# Patient Record
Sex: FEMALE | Race: BLACK OR AFRICAN AMERICAN | NOT HISPANIC OR LATINO | Employment: UNEMPLOYED | URBAN - METROPOLITAN AREA
[De-identification: names, ages, dates, MRNs, and addresses within clinical notes are randomized per-mention and may not be internally consistent; named-entity substitution may affect disease eponyms.]

---

## 2017-06-02 ENCOUNTER — HISTORICAL (OUTPATIENT)
Dept: ADMINISTRATIVE | Facility: HOSPITAL | Age: 32
End: 2017-06-02

## 2017-06-02 LAB — TSH SERPL-ACNC: 1.58 MIU/ML (ref 0.36–3.74)

## 2017-09-26 ENCOUNTER — HISTORICAL (OUTPATIENT)
Dept: ADMINISTRATIVE | Facility: HOSPITAL | Age: 32
End: 2017-09-26

## 2017-09-29 LAB — FINAL CULTURE: NORMAL

## 2017-12-21 ENCOUNTER — HISTORICAL (OUTPATIENT)
Dept: ADMINISTRATIVE | Facility: HOSPITAL | Age: 32
End: 2017-12-21

## 2017-12-21 LAB
ABS NEUT (OLG): 5.65 X10(3)/MCL (ref 2.1–9.2)
BASOPHILS # BLD AUTO: 0 X10(3)/MCL (ref 0–0.2)
BASOPHILS NFR BLD AUTO: 1 %
EOSINOPHIL # BLD AUTO: 0 X10(3)/MCL (ref 0–0.9)
EOSINOPHIL NFR BLD AUTO: 0 %
ERYTHROCYTE [DISTWIDTH] IN BLOOD BY AUTOMATED COUNT: 13.4 % (ref 11.5–17)
ERYTHROCYTE [SEDIMENTATION RATE] IN BLOOD: 36 MM/HR (ref 0–20)
FERRITIN SERPL-MCNC: 20.5 NG/ML (ref 8–388)
HCT VFR BLD AUTO: 37.4 % (ref 37–47)
HGB BLD-MCNC: 11.6 GM/DL (ref 12–16)
LYMPHOCYTES # BLD AUTO: 0.8 X10(3)/MCL (ref 0.6–4.6)
LYMPHOCYTES NFR BLD AUTO: 12 %
MCH RBC QN AUTO: 27.4 PG (ref 27–31)
MCHC RBC AUTO-ENTMCNC: 31 GM/DL (ref 33–36)
MCV RBC AUTO: 88.4 FL (ref 80–94)
MONOCYTES # BLD AUTO: 0.1 X10(3)/MCL (ref 0.1–1.3)
MONOCYTES NFR BLD AUTO: 1 %
NEUTROPHILS # BLD AUTO: 5.65 X10(3)/MCL (ref 1.4–7.9)
NEUTROPHILS NFR BLD AUTO: 86 %
PLATELET # BLD AUTO: 311 X10(3)/MCL (ref 130–400)
PMV BLD AUTO: 9 FL (ref 9.4–12.4)
RBC # BLD AUTO: 4.23 X10(6)/MCL (ref 4.2–5.4)
WBC # SPEC AUTO: 6.6 X10(3)/MCL (ref 4.5–11.5)

## 2018-03-07 ENCOUNTER — HISTORICAL (OUTPATIENT)
Dept: ADMINISTRATIVE | Facility: HOSPITAL | Age: 33
End: 2018-03-07

## 2018-03-07 LAB — TSH SERPL-ACNC: 1.91 MIU/ML (ref 0.36–3.74)

## 2018-03-28 ENCOUNTER — HISTORICAL (OUTPATIENT)
Dept: LAB | Facility: HOSPITAL | Age: 33
End: 2018-03-28

## 2020-09-27 LAB — C DIFF INTERP: NEGATIVE

## 2020-09-28 ENCOUNTER — HISTORICAL (OUTPATIENT)
Dept: INTERNAL MEDICINE | Facility: CLINIC | Age: 35
End: 2020-09-28

## 2020-09-28 LAB
ABS NEUT (OLG): 3.21 X10(3)/MCL (ref 2.1–9.2)
ALBUMIN SERPL-MCNC: 3 GM/DL (ref 3.4–5)
ALBUMIN/GLOB SERPL: 0.6 RATIO (ref 1.1–2)
ALP SERPL-CCNC: 68 UNIT/L (ref 45–117)
ALT SERPL-CCNC: 17 UNIT/L (ref 12–78)
AST SERPL-CCNC: 13 UNIT/L (ref 15–37)
BASOPHILS # BLD AUTO: 0.1 X10(3)/MCL (ref 0–0.2)
BASOPHILS NFR BLD AUTO: 1 %
BILIRUB SERPL-MCNC: 0.4 MG/DL (ref 0.2–1)
BILIRUBIN DIRECT+TOT PNL SERPL-MCNC: 0.1 MG/DL (ref 0–0.2)
BILIRUBIN DIRECT+TOT PNL SERPL-MCNC: 0.3 MG/DL
BUN SERPL-MCNC: 15 MG/DL (ref 7–18)
CALCIUM SERPL-MCNC: 8.6 MG/DL (ref 8.5–10.1)
CHLORIDE SERPL-SCNC: 107 MMOL/L (ref 98–107)
CO2 SERPL-SCNC: 22 MMOL/L (ref 21–32)
CREAT SERPL-MCNC: 0.8 MG/DL (ref 0.6–1.3)
CRP SERPL HS-MCNC: 1.8 MG/DL
DEPRECATED CALCIDIOL+CALCIFEROL SERPL-MC: 19.1 NG/ML (ref 30–80)
EOSINOPHIL # BLD AUTO: 0.8 X10(3)/MCL (ref 0–0.9)
EOSINOPHIL NFR BLD AUTO: 12 %
ERYTHROCYTE [DISTWIDTH] IN BLOOD BY AUTOMATED COUNT: 13.3 % (ref 11.5–14.5)
FERRITIN SERPL-MCNC: 9.6 NG/ML (ref 10–150)
FOLATE SERPL-MCNC: 18.1 NG/ML (ref 3.1–17.5)
GLOBULIN SER-MCNC: 5.4 GM/ML (ref 2.3–3.5)
GLUCOSE SERPL-MCNC: 86 MG/DL (ref 74–106)
HAV AB SER QL IA: NONREACTIVE
HBV SURFACE AB SER-ACNC: 0.23 M[IU]/ML
HBV SURFACE AB SERPL IA-ACNC: NONREACTIVE M[IU]/ML
HBV SURFACE AG SERPL QL IA: NONREACTIVE
HCT VFR BLD AUTO: 37.3 % (ref 35–46)
HGB BLD-MCNC: 11.9 GM/DL (ref 12–16)
IMM GRANULOCYTES # BLD AUTO: 0.01 10*3/UL
IMM GRANULOCYTES NFR BLD AUTO: 0 %
IRON SERPL-MCNC: 45 MCG/DL (ref 50–170)
LYMPHOCYTES # BLD AUTO: 2.1 X10(3)/MCL (ref 0.6–4.6)
LYMPHOCYTES NFR BLD AUTO: 30 %
MCH RBC QN AUTO: 28.7 PG (ref 26–34)
MCHC RBC AUTO-ENTMCNC: 31.9 GM/DL (ref 31–37)
MCV RBC AUTO: 89.9 FL (ref 80–100)
MONOCYTES # BLD AUTO: 0.8 X10(3)/MCL (ref 0.1–1.3)
MONOCYTES NFR BLD AUTO: 11 %
NEG CONT SPOT COUNT: NORMAL
NEUTROPHILS # BLD AUTO: 3.21 X10(3)/MCL (ref 2.1–9.2)
NEUTROPHILS NFR BLD AUTO: 46 %
PANEL A SPOT COUNT: 0
PANEL B SPOT COUNT: 0
PLATELET # BLD AUTO: 288 X10(3)/MCL (ref 130–400)
PMV BLD AUTO: 9.5 FL (ref 7.4–10.4)
POS CONT SPOT COUNT: NORMAL
POTASSIUM SERPL-SCNC: 3.4 MMOL/L (ref 3.5–5.1)
PROT SERPL-MCNC: 8.4 GM/DL (ref 6.4–8.2)
RBC # BLD AUTO: 4.15 X10(6)/MCL (ref 4–5.2)
SODIUM SERPL-SCNC: 138 MMOL/L (ref 136–145)
T-SPOT.TB: NORMAL
VIT B12 SERPL-MCNC: 469 PG/ML (ref 193–986)
WBC # SPEC AUTO: 7 X10(3)/MCL (ref 4.5–11)

## 2020-10-01 LAB — FINAL CULTURE: NORMAL

## 2021-10-13 ENCOUNTER — HISTORICAL (OUTPATIENT)
Dept: ADMINISTRATIVE | Facility: HOSPITAL | Age: 36
End: 2021-10-13

## 2021-10-13 LAB — SARS-COV-2 RNA RESP QL NAA+PROBE: NEGATIVE

## 2021-11-11 ENCOUNTER — HISTORICAL (OUTPATIENT)
Dept: ADMINISTRATIVE | Facility: HOSPITAL | Age: 36
End: 2021-11-11

## 2021-11-11 LAB — SARS-COV-2 RNA RESP QL NAA+PROBE: NEGATIVE

## 2022-04-10 ENCOUNTER — HISTORICAL (OUTPATIENT)
Dept: ADMINISTRATIVE | Facility: HOSPITAL | Age: 37
End: 2022-04-10

## 2022-04-26 VITALS
WEIGHT: 142.44 LBS | OXYGEN SATURATION: 95 % | SYSTOLIC BLOOD PRESSURE: 110 MMHG | HEIGHT: 62 IN | BODY MASS INDEX: 26.21 KG/M2 | DIASTOLIC BLOOD PRESSURE: 75 MMHG

## 2022-04-29 ENCOUNTER — HISTORICAL (OUTPATIENT)
Dept: ENDOSCOPY | Facility: HOSPITAL | Age: 37
End: 2022-04-29
Payer: MEDICAID

## 2022-05-03 DIAGNOSIS — R11.2 NAUSEA AND VOMITING, INTRACTABILITY OF VOMITING NOT SPECIFIED, UNSPECIFIED VOMITING TYPE: Primary | ICD-10-CM

## 2022-05-03 NOTE — TELEPHONE ENCOUNTER
Called and spoke to PT.  She stated that since starting the medications Azathioprine and Predisone she has been vomiting and having more BM's.  She stated that she had a total of 13+ BMs.  Today she did not take the Azathioprine but did take the prednisone.  She did not vomit but was really nauseated.  She denied rash, hives or other allergic symptoms.  What do you recommend? Please advise.

## 2022-05-03 NOTE — TELEPHONE ENCOUNTER
----- Message from Brii Brito sent at 5/3/2022  9:16 AM CDT -----  Pt called and stated she had a colonoscopy 4/29 and she was prescribed 2 medications. She stated when she takes the medication she throws up and has diarrhea. Pt can be reached @ [phone].        Thank You  Marni MORRELL

## 2022-05-03 NOTE — TELEPHONE ENCOUNTER
----- Message from Brii Brito sent at 5/3/2022  9:16 AM CDT -----  Pt called and stated she had a colonoscopy 4/29 and she was prescribed 2 medications. She stated when she takes the medication she throws up and has diarrhea. Pt can be reached @ [phone].        Thank You  Marni MORRELL

## 2022-05-10 PROBLEM — K50.10 CROHN'S DISEASE OF COLON WITHOUT COMPLICATION: Status: ACTIVE | Noted: 2022-05-10

## 2022-05-10 RX ORDER — EPINEPHRINE 1 MG/ML
0.3 INJECTION, SOLUTION, CONCENTRATE INTRAVENOUS
Status: CANCELLED | OUTPATIENT
Start: 2022-05-10

## 2022-05-10 RX ORDER — METHYLPREDNISOLONE SOD SUCC 125 MG
40 VIAL (EA) INJECTION
Status: CANCELLED | OUTPATIENT
Start: 2022-05-10

## 2022-05-10 RX ORDER — IPRATROPIUM BROMIDE AND ALBUTEROL SULFATE 2.5; .5 MG/3ML; MG/3ML
3 SOLUTION RESPIRATORY (INHALATION)
Status: CANCELLED | OUTPATIENT
Start: 2022-05-10

## 2022-05-10 RX ORDER — DIPHENHYDRAMINE HYDROCHLORIDE 50 MG/ML
25 INJECTION INTRAMUSCULAR; INTRAVENOUS
Status: CANCELLED | OUTPATIENT
Start: 2022-05-10

## 2022-05-10 RX ORDER — ACETAMINOPHEN 500 MG
500 TABLET ORAL
Status: CANCELLED | OUTPATIENT
Start: 2022-05-10

## 2022-05-10 RX ORDER — ACETAMINOPHEN 325 MG/1
650 TABLET ORAL
Status: CANCELLED | OUTPATIENT
Start: 2022-05-10

## 2022-05-11 ENCOUNTER — CLINICAL SUPPORT (OUTPATIENT)
Dept: GASTROENTEROLOGY | Facility: CLINIC | Age: 37
End: 2022-05-11
Payer: MEDICAID

## 2022-05-11 DIAGNOSIS — Z23 NEED FOR VACCINATION: Primary | ICD-10-CM

## 2022-05-11 PROCEDURE — 99211 OFF/OP EST MAY X REQ PHY/QHP: CPT | Mod: PBBFAC

## 2022-05-11 PROCEDURE — 90746 HEPB VACCINE 3 DOSE ADULT IM: CPT | Mod: PBBFAC

## 2022-05-11 RX ORDER — ERGOCALCIFEROL 1.25 MG/1
50000 CAPSULE ORAL
COMMUNITY
End: 2023-01-10

## 2022-05-11 RX ORDER — ALBUTEROL SULFATE 90 UG/1
2 AEROSOL, METERED RESPIRATORY (INHALATION) EVERY 6 HOURS PRN
COMMUNITY
Start: 2022-02-01 | End: 2022-11-21

## 2022-05-11 RX ORDER — TRIAMCINOLONE ACETONIDE 5 MG/G
OINTMENT TOPICAL 2 TIMES DAILY
COMMUNITY
Start: 2022-03-15 | End: 2023-06-20

## 2022-05-11 RX ORDER — BUDESONIDE AND FORMOTEROL FUMARATE DIHYDRATE 160; 4.5 UG/1; UG/1
2 AEROSOL RESPIRATORY (INHALATION) EVERY 12 HOURS
COMMUNITY
Start: 2022-02-16

## 2022-05-11 RX ORDER — PREDNISONE 10 MG/1
30 TABLET ORAL DAILY
COMMUNITY
Start: 2022-04-29 | End: 2022-08-09

## 2022-05-11 RX ORDER — ALBUTEROL SULFATE 0.83 MG/ML
2.5 SOLUTION RESPIRATORY (INHALATION)
COMMUNITY
Start: 2021-11-11

## 2022-05-12 ENCOUNTER — TELEPHONE (OUTPATIENT)
Dept: GASTROENTEROLOGY | Facility: CLINIC | Age: 37
End: 2022-05-12
Payer: MEDICAID

## 2022-05-12 NOTE — TELEPHONE ENCOUNTER
Arjun Lezama,   It's the [employer] phones. I was able to reach her on her cell. I have asked her to sign up for the portal so she can see her appts. Also, If you give me her appt date/time, I will call her. Thanks

## 2022-05-12 NOTE — TELEPHONE ENCOUNTER
----- Message from Teja Abdiine sent at 5/12/2022 10:55 AM CDT -----  Regarding: RE: Inflectra  DO you have updated contact info for her? That number wasn't working.  ----- Message -----  From: Elizabeth Mariscal RN  Sent: 5/10/2022   4:16 PM CDT  To: Teja Salazardrine  Subject: Inflectra                                        Please schedule pt for Inflectra infusions. Loading dose wk 0,2,6 then q 8 after. PA & orders in chart. She will need trough level prior to first maintenance infusion. Thanks

## 2022-05-18 RX ORDER — ONDANSETRON 4 MG/1
4 TABLET, ORALLY DISINTEGRATING ORAL EVERY 6 HOURS PRN
Qty: 30 TABLET | Refills: 2 | Status: SHIPPED | OUTPATIENT
Start: 2022-05-18 | End: 2022-05-28

## 2022-05-20 ENCOUNTER — INFUSION (OUTPATIENT)
Dept: INFUSION THERAPY | Facility: HOSPITAL | Age: 37
End: 2022-05-20
Attending: INTERNAL MEDICINE
Payer: MEDICAID

## 2022-05-20 VITALS
DIASTOLIC BLOOD PRESSURE: 61 MMHG | WEIGHT: 138.44 LBS | BODY MASS INDEX: 25.48 KG/M2 | SYSTOLIC BLOOD PRESSURE: 106 MMHG | HEART RATE: 81 BPM | RESPIRATION RATE: 18 BRPM | TEMPERATURE: 98 F | HEIGHT: 62 IN

## 2022-05-20 DIAGNOSIS — K50.10 CROHN'S DISEASE OF COLON WITHOUT COMPLICATION: Primary | ICD-10-CM

## 2022-05-20 PROCEDURE — 63600175 PHARM REV CODE 636 W HCPCS

## 2022-05-20 PROCEDURE — 96375 TX/PRO/DX INJ NEW DRUG ADDON: CPT

## 2022-05-20 PROCEDURE — 96415 CHEMO IV INFUSION ADDL HR: CPT

## 2022-05-20 PROCEDURE — 96413 CHEMO IV INFUSION 1 HR: CPT

## 2022-05-20 PROCEDURE — 25000003 PHARM REV CODE 250: Performed by: INTERNAL MEDICINE

## 2022-05-20 PROCEDURE — 63600175 PHARM REV CODE 636 W HCPCS: Mod: JG | Performed by: INTERNAL MEDICINE

## 2022-05-20 RX ORDER — METHYLPREDNISOLONE SOD SUCC 125 MG
40 VIAL (EA) INJECTION
Status: CANCELLED | OUTPATIENT
Start: 2022-07-15

## 2022-05-20 RX ORDER — ACETAMINOPHEN 500 MG
500 TABLET ORAL
Status: CANCELLED | OUTPATIENT
Start: 2022-07-15

## 2022-05-20 RX ORDER — EPINEPHRINE 1 MG/ML
0.3 INJECTION, SOLUTION, CONCENTRATE INTRAVENOUS
Status: CANCELLED | OUTPATIENT
Start: 2022-07-15

## 2022-05-20 RX ORDER — DIPHENHYDRAMINE HYDROCHLORIDE 50 MG/ML
25 INJECTION INTRAMUSCULAR; INTRAVENOUS
Status: COMPLETED | OUTPATIENT
Start: 2022-05-20 | End: 2022-05-20

## 2022-05-20 RX ORDER — ACETAMINOPHEN 500 MG
500 TABLET ORAL
Status: COMPLETED | OUTPATIENT
Start: 2022-05-20 | End: 2022-05-20

## 2022-05-20 RX ORDER — ACETAMINOPHEN 325 MG/1
650 TABLET ORAL
Status: CANCELLED | OUTPATIENT
Start: 2022-07-15

## 2022-05-20 RX ORDER — DIPHENHYDRAMINE HYDROCHLORIDE 50 MG/ML
25 INJECTION INTRAMUSCULAR; INTRAVENOUS
Status: CANCELLED | OUTPATIENT
Start: 2022-07-15

## 2022-05-20 RX ORDER — METHYLPREDNISOLONE SOD SUCC 125 MG
VIAL (EA) INJECTION
Status: COMPLETED
Start: 2022-05-20 | End: 2022-05-20

## 2022-05-20 RX ORDER — IPRATROPIUM BROMIDE AND ALBUTEROL SULFATE 2.5; .5 MG/3ML; MG/3ML
3 SOLUTION RESPIRATORY (INHALATION)
Status: CANCELLED | OUTPATIENT
Start: 2022-07-15

## 2022-05-20 RX ADMIN — SODIUM CHLORIDE 300 MG: 0.9 INJECTION, SOLUTION INTRAVENOUS at 09:05

## 2022-05-20 RX ADMIN — METHYLPREDNISOLONE SODIUM SUCCINATE 40 MG: 125 INJECTION, POWDER, FOR SOLUTION INTRAMUSCULAR; INTRAVENOUS at 08:05

## 2022-05-20 RX ADMIN — SODIUM CHLORIDE: 9 INJECTION, SOLUTION INTRAVENOUS at 08:05

## 2022-05-20 RX ADMIN — DIPHENHYDRAMINE HYDROCHLORIDE 25 MG: 50 INJECTION INTRAMUSCULAR; INTRAVENOUS at 08:05

## 2022-05-20 RX ADMIN — ACETAMINOPHEN 500 MG: 500 TABLET ORAL at 08:05

## 2022-05-20 NOTE — NURSING
08:03 Patient here for loading dose of Inflectra. Voices no c/o.  12:05 Patient tolerated treatment well.

## 2022-05-21 NOTE — HISTORICAL [facility]ER
This is a historical note converted from [employer]. Formatting and pictures may have been removed.  Please reference [employer] for original formatting and attached multimedia. Chief Complaint  Colonoscopy  History of Present Illness  37F PMHx Crohns presenting for colonoscopy. Last colonoscopy 2015 when diagnosed. Patient tolerated?bowel prep in entirety and is having clear/yellow stools. Denies any abdominal pain, fevers/chills. Recent weight loss ~10-15 lbs due to decreased appetite and NV. Also w/ daily diarrhea, NB. She was on humira but stopped because she did not have a prescription.  Review of Systems  12 point review of systems negative unless otherwise stated  Physical Exam  General:?NAD. Laying in bed comfortably.  Eye: clear conjunctiva, eyelids normal  HENT:?Trachea midline. Normocephalic. Atraumatic  Respiratory:?Symmetric chest expansion. Nonlabored breathing.  Cardiovascular:?regular rate  Gastrointestinal:?soft, non-tender, non-distended, no masses  Genitourinary: no CVA tenderness  Musculoskeletal:?full range of motion of all extremities  Integumentary: no rashes or skin lesions present  Neurologic: Cranial nerves grossly intact  ?  Assessment/Plan  37F PMHx Crohns presenting for colonoscopy  ?   RBA of colonoscopy discussed w/ patient and written consent obtained  Plan for colonoscopy w/ Dr. Blanton  ?  ?   Mariaelena Sommers MD  PGY2 General Surgery?   Problem List/Past Medical History  Ongoing  Asthma  Crohns disease  Historical  Crohns disease  Procedure/Surgical History  Computed tomography, abdomen and pelvis; with contrast material(s) (08/31/2018)  Radiologic examination, gastrointestinal tract, upper; with small intestine, includes multiple serial films (07/18/2018)  Radiologic examination, gastrointestinal tract, upper; with small intestine, includes multiple serial films (07/18/2018)  FISTULA  Tonsillectomy and adenoidectomy; age 12 or over   Medications  Inpatient  No active inpatient  medications  Home  albuterol 0.083% inhalation solution, 2.5 mg= 3 mL, NEB, q6hr, PRN  albuterol 2.5 mg/3 mL (0.083%) inhalation solution, 5 mg= 6 mL, INH, q6hr, PRN  albuterol 90 mcg/inh inhalation aerosol, 1 puff(s), INH, q6hr, PRN  cyproheptadine 4 mg oral tablet, 4 mg= 1 tab(s), Oral, TID  MoviPrep oral powder for reconstitution, 240 mL, Oral, As Directed  prednisONE 5 mg oral tablet, 5 mg= 1 tab(s), Oral, Daily  PrenaCare oral tablet, 1 tab(s), Oral, Daily,? ?Not taking  Allergies  No Known Allergies  Social History  Abuse/Neglect  No, No, Yes, 04/26/2022  Alcohol - No Risk, 12/15/2014  Never, 10/13/2021  Employment/School  Unemployed, 10/13/2021  Substance Use  Never, 10/13/2021  Tobacco - No Risk, 12/15/2014  Never (less than 100 in lifetime), No, 04/26/2022  Family History  Diabetes mellitus type 2: Father.  Immunizations  Vaccine Date Status   influenza virus vaccine, inactivated 09/22/2020 Given   tuberculin purified protein derivative 04/07/2015 Given   measles/mumps/rubella virus vaccine 01/26/2012 Recorded   tetanus/diphtheria/pertussis, acel(Tdap) 09/20/2010 Recorded   measles/mumps/rubella virus vaccine 08/22/1991 Recorded   poliovirus vaccine, live, trivalent 05/03/1990 Recorded   poliovirus vaccine, live, trivalent 11/05/1987 Recorded   measles/mumps/rubella virus vaccine 07/30/1987 Recorded   poliovirus vaccine, live, trivalent 1986 Recorded   poliovirus vaccine, live, trivalent 1986 Recorded       Recently seen in clinic.? See clinic note for details

## 2022-06-03 ENCOUNTER — INFUSION (OUTPATIENT)
Dept: INFUSION THERAPY | Facility: HOSPITAL | Age: 37
End: 2022-06-03
Attending: INTERNAL MEDICINE
Payer: MEDICAID

## 2022-06-03 VITALS — HEART RATE: 95 BPM | SYSTOLIC BLOOD PRESSURE: 107 MMHG | DIASTOLIC BLOOD PRESSURE: 73 MMHG

## 2022-06-03 DIAGNOSIS — K50.10 CROHN'S DISEASE OF COLON WITHOUT COMPLICATION: Primary | ICD-10-CM

## 2022-06-03 PROCEDURE — 96375 TX/PRO/DX INJ NEW DRUG ADDON: CPT

## 2022-06-03 PROCEDURE — 96413 CHEMO IV INFUSION 1 HR: CPT

## 2022-06-03 PROCEDURE — 96415 CHEMO IV INFUSION ADDL HR: CPT

## 2022-06-03 PROCEDURE — 96367 TX/PROPH/DG ADDL SEQ IV INF: CPT

## 2022-06-03 PROCEDURE — 25000003 PHARM REV CODE 250: Performed by: INTERNAL MEDICINE

## 2022-06-03 PROCEDURE — 63600175 PHARM REV CODE 636 W HCPCS: Mod: JG | Performed by: INTERNAL MEDICINE

## 2022-06-03 RX ORDER — METHYLPREDNISOLONE SOD SUCC 125 MG
40 VIAL (EA) INJECTION
Status: COMPLETED | OUTPATIENT
Start: 2022-06-03 | End: 2022-06-03

## 2022-06-03 RX ORDER — IPRATROPIUM BROMIDE AND ALBUTEROL SULFATE 2.5; .5 MG/3ML; MG/3ML
3 SOLUTION RESPIRATORY (INHALATION)
Status: CANCELLED | OUTPATIENT
Start: 2022-07-15

## 2022-06-03 RX ORDER — EPINEPHRINE 1 MG/ML
0.3 INJECTION, SOLUTION, CONCENTRATE INTRAVENOUS
Status: CANCELLED | OUTPATIENT
Start: 2022-07-15

## 2022-06-03 RX ORDER — DIPHENHYDRAMINE HYDROCHLORIDE 50 MG/ML
25 INJECTION INTRAMUSCULAR; INTRAVENOUS
Status: CANCELLED | OUTPATIENT
Start: 2022-07-15

## 2022-06-03 RX ORDER — ACETAMINOPHEN 500 MG
500 TABLET ORAL
Status: COMPLETED | OUTPATIENT
Start: 2022-06-03 | End: 2022-06-03

## 2022-06-03 RX ORDER — ACETAMINOPHEN 325 MG/1
650 TABLET ORAL
Status: CANCELLED | OUTPATIENT
Start: 2022-07-15

## 2022-06-03 RX ORDER — DIPHENHYDRAMINE HYDROCHLORIDE 50 MG/ML
25 INJECTION INTRAMUSCULAR; INTRAVENOUS
Status: COMPLETED | OUTPATIENT
Start: 2022-06-03 | End: 2022-06-03

## 2022-06-03 RX ORDER — ACETAMINOPHEN 500 MG
500 TABLET ORAL
Status: CANCELLED | OUTPATIENT
Start: 2022-07-15

## 2022-06-03 RX ORDER — METHYLPREDNISOLONE SOD SUCC 125 MG
40 VIAL (EA) INJECTION
Status: CANCELLED | OUTPATIENT
Start: 2022-07-15

## 2022-06-03 RX ADMIN — ACETAMINOPHEN 500 MG: 500 TABLET ORAL at 11:06

## 2022-06-03 RX ADMIN — METHYLPREDNISOLONE SODIUM SUCCINATE 40 MG: 125 INJECTION, POWDER, FOR SOLUTION INTRAMUSCULAR; INTRAVENOUS at 12:06

## 2022-06-03 RX ADMIN — SODIUM CHLORIDE 300 MG: 0.9 INJECTION, SOLUTION INTRAVENOUS at 12:06

## 2022-06-03 RX ADMIN — SODIUM CHLORIDE: 9 INJECTION, SOLUTION INTRAVENOUS at 12:06

## 2022-06-03 RX ADMIN — DIPHENHYDRAMINE HYDROCHLORIDE 25 MG: 50 INJECTION, SOLUTION INTRAMUSCULAR; INTRAVENOUS at 12:06

## 2022-06-03 NOTE — NURSING
11:17am Patient here for Infectra wk 2. Voices no c/o.   15:15 Patient discharged to home. Tolerated treatment well.

## 2022-06-13 ENCOUNTER — CLINICAL SUPPORT (OUTPATIENT)
Dept: GASTROENTEROLOGY | Facility: CLINIC | Age: 37
End: 2022-06-13
Payer: MEDICAID

## 2022-06-13 DIAGNOSIS — Z23 NEED FOR VACCINATION: Primary | ICD-10-CM

## 2022-06-13 PROCEDURE — 90746 HEPB VACCINE 3 DOSE ADULT IM: CPT | Mod: PBBFAC

## 2022-06-13 PROCEDURE — 99212 OFFICE O/P EST SF 10 MIN: CPT | Mod: PBBFAC

## 2022-06-13 NOTE — PROGRESS NOTES
Hepatitis B vaccine given in right deltoid.  No complaints.  Pt notified last dose due in 5 months.  Verbalized understanding.

## 2022-07-01 ENCOUNTER — INFUSION (OUTPATIENT)
Dept: INFUSION THERAPY | Facility: HOSPITAL | Age: 37
End: 2022-07-01
Attending: INTERNAL MEDICINE
Payer: MEDICAID

## 2022-07-01 VITALS
HEART RATE: 86 BPM | DIASTOLIC BLOOD PRESSURE: 73 MMHG | SYSTOLIC BLOOD PRESSURE: 107 MMHG | HEIGHT: 62 IN | WEIGHT: 154.13 LBS | BODY MASS INDEX: 28.36 KG/M2

## 2022-07-01 DIAGNOSIS — K50.10 CROHN'S DISEASE OF COLON WITHOUT COMPLICATION: Primary | ICD-10-CM

## 2022-07-01 PROCEDURE — 25000003 PHARM REV CODE 250: Performed by: INTERNAL MEDICINE

## 2022-07-01 PROCEDURE — 63600175 PHARM REV CODE 636 W HCPCS: Performed by: INTERNAL MEDICINE

## 2022-07-01 PROCEDURE — 63600175 PHARM REV CODE 636 W HCPCS

## 2022-07-01 RX ORDER — METHYLPREDNISOLONE SOD SUCC 125 MG
40 VIAL (EA) INJECTION
Status: CANCELLED | OUTPATIENT
Start: 2022-07-15

## 2022-07-01 RX ORDER — DIPHENHYDRAMINE HYDROCHLORIDE 50 MG/ML
25 INJECTION INTRAMUSCULAR; INTRAVENOUS
Status: COMPLETED | OUTPATIENT
Start: 2022-07-01 | End: 2022-07-01

## 2022-07-01 RX ORDER — DIPHENHYDRAMINE HYDROCHLORIDE 50 MG/ML
25 INJECTION INTRAMUSCULAR; INTRAVENOUS
Status: CANCELLED | OUTPATIENT
Start: 2022-07-15

## 2022-07-01 RX ORDER — ACETAMINOPHEN 325 MG/1
650 TABLET ORAL
Status: CANCELLED | OUTPATIENT
Start: 2022-07-15

## 2022-07-01 RX ORDER — METHYLPREDNISOLONE SOD SUCC 125 MG
VIAL (EA) INJECTION
Status: DISCONTINUED
Start: 2022-07-01 | End: 2022-07-01 | Stop reason: SDUPTHER

## 2022-07-01 RX ORDER — METHYLPREDNISOLONE SOD SUCC 125 MG
40 VIAL (EA) INJECTION
Status: COMPLETED | OUTPATIENT
Start: 2022-07-01 | End: 2022-07-01

## 2022-07-01 RX ORDER — ACETAMINOPHEN 500 MG
500 TABLET ORAL
Status: CANCELLED | OUTPATIENT
Start: 2022-07-15

## 2022-07-01 RX ORDER — EPINEPHRINE 1 MG/ML
0.3 INJECTION, SOLUTION, CONCENTRATE INTRAVENOUS
Status: CANCELLED | OUTPATIENT
Start: 2022-07-15

## 2022-07-01 RX ORDER — IPRATROPIUM BROMIDE AND ALBUTEROL SULFATE 2.5; .5 MG/3ML; MG/3ML
3 SOLUTION RESPIRATORY (INHALATION)
Status: CANCELLED | OUTPATIENT
Start: 2022-07-15

## 2022-07-01 RX ORDER — METHYLPREDNISOLONE SOD SUCC 125 MG
40 VIAL (EA) INJECTION
Status: CANCELLED
Start: 2022-07-15

## 2022-07-01 RX ORDER — ACETAMINOPHEN 500 MG
500 TABLET ORAL
Status: COMPLETED | OUTPATIENT
Start: 2022-07-01 | End: 2022-07-01

## 2022-07-01 RX ADMIN — Medication 40 MG: at 12:07

## 2022-07-01 RX ADMIN — DIPHENHYDRAMINE HYDROCHLORIDE 25 MG: 50 INJECTION, SOLUTION INTRAMUSCULAR; INTRAVENOUS at 12:07

## 2022-07-01 RX ADMIN — SODIUM CHLORIDE 350 MG: 0.9 INJECTION, SOLUTION INTRAVENOUS at 12:07

## 2022-07-01 RX ADMIN — ACETAMINOPHEN 500 MG: 500 TABLET ORAL at 12:07

## 2022-07-01 RX ADMIN — METHYLPREDNISOLONE SODIUM SUCCINATE 40 MG: 125 INJECTION, POWDER, FOR SOLUTION INTRAMUSCULAR; INTRAVENOUS at 12:07

## 2022-07-01 RX ADMIN — SODIUM CHLORIDE: 9 INJECTION, SOLUTION INTRAVENOUS at 12:07

## 2022-07-01 NOTE — NURSING
11:26am Patient is here for Inflectra wk 6 infusion. Voices no c/o. Next infusion will be wk 14 in 8weeks with labs.

## 2022-08-09 ENCOUNTER — OFFICE VISIT (OUTPATIENT)
Dept: GASTROENTEROLOGY | Facility: CLINIC | Age: 37
End: 2022-08-09
Payer: MEDICAID

## 2022-08-09 VITALS
RESPIRATION RATE: 18 BRPM | TEMPERATURE: 98 F | SYSTOLIC BLOOD PRESSURE: 94 MMHG | BODY MASS INDEX: 28.59 KG/M2 | WEIGHT: 155.38 LBS | DIASTOLIC BLOOD PRESSURE: 68 MMHG | HEART RATE: 92 BPM | HEIGHT: 62 IN

## 2022-08-09 DIAGNOSIS — K50.10 CROHN'S DISEASE OF COLON WITHOUT COMPLICATION: ICD-10-CM

## 2022-08-09 PROCEDURE — 99214 OFFICE O/P EST MOD 30 MIN: CPT | Mod: PBBFAC | Performed by: INTERNAL MEDICINE

## 2022-08-09 NOTE — PROGRESS NOTES
Inflammatory Bowel Disease        Established Patient Note         TODAY'S VISIT DATE:  8/9/2022  The patient's last visit with me was on Visit date not found.     PCP: Mary Carmen Ocampo      Referring MD:   No ref. provider found    History of Present Illness:  Shivani is a 37 year old with Crohn's colitis here for follow-up.     She was diagnosed with Crohn's colitis by Dr. Ann in January 2015 when she began experiencing diarrhea, abdominal pain, and weight loss. When she was diagnosed she was initially started on a steroid taper, balsalazide and when she did not improve, Humira was started. Fecal calprotectin in march 2018: 445. Adalimumab trough in May 2018 was 1.8, Ab<25, so azathioprine was added. She did not tolerate azathioprine per notes. Due to continued symptoms and low trough level, her Humira dose was increased to 40mg weekly in 2018. She was kept on balsalazide as well.     She did have a rectal abscess in the past, referred to surgery by Dr. Ann, and underwent I&D. No notes to review regarding this and findings.    Adalimumab level in October 2019 was 13, Ab<25. Unsure if this was trough level.    September 2020: Adalimumab trough: 5.7, Ab <25  September 2020: fecal calprotectin 936    When seen initially by me in September 2020, she was doing well clinically. She reported compliance with her Humira weekly. She was no longer taking balsalazide because she felt that it was unhelpful. She reported baseline 1-2 bowel movements a day with occasional hard stools with bloody streaks. She did report occasional breakthrough episodes of increased diarrhea and abdominal pain. She had never had a repeat colonoscopy since starting on Humira. I recommended a colonoscopy at that time. Despite several scheduled colonoscopies, she canceled each appointment and did not follow up till April 2022    She was found to be pregnant in November 2021. She had a miscarriage during this  pregnancy.    In April 2022 on follow-up she was having diarrhea, abdominal pain, weight loss.  She had not been on Humira due to prescription lapse for 3 months.  She was taking NSAIDs and was given prednisone 5mg by PCP which was not helpful. She denied heartburn or reflux.    April 2022 Colonoscopy: multiple scattered medium to large ulcerations throughout the colon.     I started her on infliximab (inflectra) and azathioprine in May 2022.  She has completed loading doses of infliximab.  The azathioprine made her have nausea, vomiting, diarrhea so she stopped taking it and symptoms resolved.  She completed a prednisone taper as well.     She thinks she is around 10 weeks pregnant.  She has not established prenatal care yet.   She has completed her induction doses.  She is now having 1 formed stool a day.  No longer having diarrhea.  She denies any rectal bleeding or abdominal pain.  Appetite is good and weight is stable.      She is having heartburn since she became pregnant.  She is taking otc prilosec or prevacid (she's not sure) which is helpful.     She does not smoke, no significant alcohol or recreational drug use. No FH of IBD..    IBD History:  IBD disease: Crohn's disease  Disease location: Colon    Current IBD Therapy:  Inflectra 5mg/kg s/p induction doses  (May 2022 - present)    Therapeutic Drug Monitoring Labs:      Prior IBD Therapies:  Balsalazide  Humira 40mg weekly (2018- Jan 2022)  Prednisone  Azathioprine      Pertinent Endoscopy/Imaging:  January 7, 2015: Colonoscopy (Dr. Ann): Erythematous mucosa and cratered ulcerations in the ascending and transverse colon. Erythematous mucosa and white plaques in the sigmoid and descending colon. No comment on TI. Pathology: Focal colitis, moderate activity, c/w IBD    April 29, 2022: Colonoscopy: skin tags, normal TI, Med to large patches of deep ulcerations surrounded by normal mucosa in the rectum, sigmoid, descending, transverse, and ascending.   Path: severe active chronic colitis with cryptitis, crypt abscess, dropout      Prior Pertinent Surgeries:   none    Complications:   none    Extraintestinal Manifestations:  None    Review of Systems   Constitutional:  Negative for appetite change and unexpected weight change.   HENT:  Negative for trouble swallowing.    Respiratory:  Negative for chest tightness.    Cardiovascular: Negative.    Gastrointestinal:  Positive for reflux. Negative for abdominal pain, change in bowel habit and change in bowel habit.   Musculoskeletal: Negative.    Neurological: Negative.    Psychiatric/Behavioral: Negative.     All other systems reviewed and are negative.       Medical/Surgical History:   Past Medical History:   Diagnosis Date    Crohn's colitis      History reviewed. No pertinent surgical history.      Family History:   History reviewed. No pertinent family history.     Social History:   Social History     Socioeconomic History    Marital status: Single   Tobacco Use    Smoking status: Never Smoker    Smokeless tobacco: Never Used   Substance and Sexual Activity    Alcohol use: Not Currently    Drug use: Never    Sexual activity: Yes     Partners: Male     Birth control/protection: None     Comment: Currently pregnant        Review of patient's allergies indicates:  No Known Allergies    Current Medications:   Outpatient Medications Marked as Taking for the 8/9/22 encounter (Office Visit) with Mary Blanton MD   Medication Sig Dispense Refill    albuterol (PROVENTIL) 2.5 mg /3 mL (0.083 %) nebulizer solution Inhale 2.5 mg into the lungs as needed.      albuterol (PROVENTIL/VENTOLIN HFA) 90 mcg/actuation inhaler Inhale 2 puffs into the lungs every 6 (six) hours as needed.      albuterol sulfate 90 mcg/actuation aebs Inhale 2 puffs into the lungs every 4 to 6 hours as needed.      ergocalciferol (ERGOCALCIFEROL) 50,000 unit Cap Take 50,000 Units by mouth every 7 days.      SYMBICORT 160-4.5 mcg/actuation HFAA  "Inhale 2 puffs into the lungs every 12 (twelve) hours.      triamcinolone (KENALOG) 0.5 % ointment Apply topically 2 (two) times daily.          Vital Signs:  BP 94/68 (BP Location: Right arm, Patient Position: Sitting, BP Method: Medium (Automatic))   Pulse 92   Temp 98 °F (36.7 °C)   Resp 18   Ht 5' 2" (1.575 m)   Wt 70.5 kg (155 lb 6.4 oz)   BMI 28.42 kg/m²      Physical Exam  Constitutional:       Appearance: Normal appearance.   HENT:      Head: Normocephalic and atraumatic.      Mouth/Throat:      Mouth: Mucous membranes are moist.   Eyes:      Conjunctiva/sclera: Conjunctivae normal.   Cardiovascular:      Rate and Rhythm: Normal rate and regular rhythm.   Pulmonary:      Effort: Pulmonary effort is normal.      Breath sounds: Normal breath sounds.   Abdominal:      General: Bowel sounds are normal.      Palpations: Abdomen is soft.   Musculoskeletal:         General: Normal range of motion.      Cervical back: Normal range of motion.   Skin:     General: Skin is warm.   Neurological:      General: No focal deficit present.      Mental Status: She is alert and oriented to person, place, and time. Mental status is at baseline.   Psychiatric:         Mood and Affect: Mood normal.         Behavior: Behavior normal.       Labs: Reviewed  Hgb   Date Value Ref Range Status   11/20/2021 13.0 12.0 - 16.0 gm/dL Final     Albumin Level   Date Value Ref Range Status   11/20/2021 3.2 (L) 3.5 - 5.0 gm/dL Final     Iron Level   Date Value Ref Range Status   09/28/2020 45 (L) 50 - 170 mcg/dL Final     Ferritin Level   Date Value Ref Range Status   09/28/2020 9.6 (L) 10.0 - 150.0 ng/mL Final     Folate Level   Date Value Ref Range Status   09/28/2020 18.1 (H) 3.1 - 17.5 ng/mL Final     Vitamin B12 Level   Date Value Ref Range Status   09/28/2020 469 193 - 986 pg/mL Final     Vit D 25 OH   Date Value Ref Range Status   09/28/2020 19.1 (L) 30.0 - 80.0 ng/mL Final     Hepatitis B Surface Antigen   Date Value Ref Range " Status   09/28/2020 Nonreactive  Final      HEALTH MAINTENANCE: April 2022  Hgb: 12.7 g/dL  Alb: 3.6 g/dL  Iron: 35 mcg/dL  Ferritin: 27 ng/mL  B12: 854 pg/mL  Folate: 10.4 ng/mL  Vitamin D: 13.4 ng/mL  Zinc: 0.7  T spot: negative April 2022  HBsAg: negative April 2022  CRP: 3.45  Fecal calprotectin: March 2018: 445 --> Sept 2020: 936      Assessment/Plan:    Problem List Items Addressed This Visit          GI    Crohn's disease of colon without complication     Crohn's colitis based on initial colonoscopy in 2015.   Previously on Humira 40mg weekly, clinically seemed to improve but no endoscopic evaluation to assess for mucosal healing  Adalimumab trough 5.7, Ab < 25 when on Humira 40mg weekly back in Sept 2020  Stopped Humira early 2022 April 2022 Colonoscopy: active Crohn's colitis with scattered ulcerations  Inflectra 5mg/kg started May 2022.  Could not tolerate azathioprine  Clinical improvement.   Currently pregnant   Check week 14 trough level.  Goal trough > 10 regardless of pregnancy  Check fecal calprotectin and labs  Attempt to get fecal calprotectin normalized during pregnancy to decrease risk of pregnancy related complications  Start asa 81 mg              Relevant Orders    Calprotectin, Stool (Completed)           HEALTH MAINTENANCE:     Vaccinations:    Influenza (inactive):  recommended annually   PCV 13 (Prevnar): recommended  PPSV 23 (Pneumovax): 2-12 mos after PCV 13, repeat 5 years later and then after age 64 yo  Tetanus (TdaP): September 2010, recommended every 10 years  HPV (males and females ages 11-46 yo): N/A  Meningococcal: No risk factors   Hepatitis B: Twinrix May 2022, June 2022  Hepatitis A:  Twinrix May 2022, June 2022  MMR (live vaccine): UTD          Chickenpox status/Varicella (live vaccine): immune, +Ab  Shingrix: recommended   COVID-19: recommend    Colorectal cancer risk:  Risk factors: > 8 years of disease, > 1/3 colon involved  - Distribution of colonic disease: > 1/3 of  colon  - Year of symptom onset: 2015  - Colonoscopy every 1-3 years after endoscopic remission    Ophthalmologic exam recommended yearly  Dermatologic exam recommended yearly due to risk of skin cancer with IBD/meds    Bone health:  Calcium 0020-6002 mg daily and vitamin D 800 IU daily  Risk factors for osteopenia/osteoporosis: steroid use  Vitamin D: 13.4  Ergocalciferol 50,000 IU weekly x 12 weeks  DEXA scan: recommend    Females:  Gynecological exam yearly.      Smoking status: nonsmoker      Follow up: 3 months

## 2022-08-19 ENCOUNTER — APPOINTMENT (OUTPATIENT)
Dept: LAB | Facility: HOSPITAL | Age: 37
End: 2022-08-19
Attending: INTERNAL MEDICINE
Payer: MEDICAID

## 2022-08-22 LAB — CALPROTECTIN STL-MCNT: 751 MCG/G

## 2022-08-26 ENCOUNTER — INFUSION (OUTPATIENT)
Dept: INFUSION THERAPY | Facility: HOSPITAL | Age: 37
End: 2022-08-26
Attending: INTERNAL MEDICINE
Payer: MEDICAID

## 2022-08-26 VITALS
HEART RATE: 90 BPM | SYSTOLIC BLOOD PRESSURE: 110 MMHG | RESPIRATION RATE: 18 BRPM | HEIGHT: 62 IN | WEIGHT: 156.38 LBS | DIASTOLIC BLOOD PRESSURE: 69 MMHG | BODY MASS INDEX: 28.78 KG/M2 | TEMPERATURE: 98 F

## 2022-08-26 DIAGNOSIS — K50.10 CROHN'S DISEASE OF COLON WITHOUT COMPLICATION: Primary | ICD-10-CM

## 2022-08-26 PROCEDURE — 25000003 PHARM REV CODE 250: Performed by: INTERNAL MEDICINE

## 2022-08-26 PROCEDURE — 96413 CHEMO IV INFUSION 1 HR: CPT

## 2022-08-26 PROCEDURE — 63600175 PHARM REV CODE 636 W HCPCS: Performed by: INTERNAL MEDICINE

## 2022-08-26 PROCEDURE — 96415 CHEMO IV INFUSION ADDL HR: CPT

## 2022-08-26 RX ORDER — ACETAMINOPHEN 500 MG
500 TABLET ORAL
Status: CANCELLED | OUTPATIENT
Start: 2022-09-09

## 2022-08-26 RX ORDER — ACETAMINOPHEN 325 MG/1
650 TABLET ORAL
Status: CANCELLED | OUTPATIENT
Start: 2022-09-09

## 2022-08-26 RX ORDER — METHYLPREDNISOLONE SOD SUCC 125 MG
40 VIAL (EA) INJECTION
Status: CANCELLED
Start: 2022-09-09

## 2022-08-26 RX ORDER — METHYLPREDNISOLONE SOD SUCC 125 MG
40 VIAL (EA) INJECTION
Status: DISCONTINUED | OUTPATIENT
Start: 2022-08-26 | End: 2022-08-26 | Stop reason: HOSPADM

## 2022-08-26 RX ORDER — DIPHENHYDRAMINE HYDROCHLORIDE 50 MG/ML
25 INJECTION INTRAMUSCULAR; INTRAVENOUS
Status: CANCELLED | OUTPATIENT
Start: 2022-09-09

## 2022-08-26 RX ORDER — EPINEPHRINE 1 MG/ML
0.3 INJECTION, SOLUTION, CONCENTRATE INTRAVENOUS
Status: CANCELLED | OUTPATIENT
Start: 2022-09-09

## 2022-08-26 RX ORDER — METHYLPREDNISOLONE SOD SUCC 125 MG
40 VIAL (EA) INJECTION
Status: CANCELLED | OUTPATIENT
Start: 2022-09-09

## 2022-08-26 RX ORDER — ACETAMINOPHEN 500 MG
500 TABLET ORAL
Status: COMPLETED | OUTPATIENT
Start: 2022-08-26 | End: 2022-08-26

## 2022-08-26 RX ORDER — IPRATROPIUM BROMIDE AND ALBUTEROL SULFATE 2.5; .5 MG/3ML; MG/3ML
3 SOLUTION RESPIRATORY (INHALATION)
Status: CANCELLED | OUTPATIENT
Start: 2022-09-09

## 2022-08-26 RX ORDER — DIPHENHYDRAMINE HYDROCHLORIDE 50 MG/ML
25 INJECTION INTRAMUSCULAR; INTRAVENOUS
Status: COMPLETED | OUTPATIENT
Start: 2022-08-26 | End: 2022-08-26

## 2022-08-26 RX ADMIN — SODIUM CHLORIDE 350 MG: 0.9 INJECTION, SOLUTION INTRAVENOUS at 12:08

## 2022-08-26 RX ADMIN — METHYLPREDNISOLONE SODIUM SUCCINATE 40 MG: 40 INJECTION, POWDER, FOR SOLUTION INTRAMUSCULAR; INTRAVENOUS at 12:08

## 2022-08-26 RX ADMIN — DIPHENHYDRAMINE HYDROCHLORIDE 25 MG: 50 INJECTION INTRAMUSCULAR; INTRAVENOUS at 12:08

## 2022-08-26 RX ADMIN — SODIUM CHLORIDE: 9 INJECTION, SOLUTION INTRAVENOUS at 12:08

## 2022-08-26 RX ADMIN — ACETAMINOPHEN 500 MG: 500 TABLET ORAL at 12:08

## 2022-08-30 ENCOUNTER — PATIENT MESSAGE (OUTPATIENT)
Dept: GASTROENTEROLOGY | Facility: CLINIC | Age: 37
End: 2022-08-30
Payer: MEDICAID

## 2022-09-07 ENCOUNTER — HOSPITAL ENCOUNTER (EMERGENCY)
Facility: HOSPITAL | Age: 37
Discharge: HOME | End: 2022-09-07
Attending: STUDENT IN AN ORGANIZED HEALTH CARE EDUCATION/TRAINING PROGRAM
Payer: MEDICAID

## 2022-09-07 VITALS
RESPIRATION RATE: 16 BRPM | BODY MASS INDEX: 28.69 KG/M2 | HEIGHT: 62 IN | SYSTOLIC BLOOD PRESSURE: 116 MMHG | DIASTOLIC BLOOD PRESSURE: 69 MMHG | TEMPERATURE: 98 F | OXYGEN SATURATION: 95 % | WEIGHT: 155.88 LBS | HEART RATE: 68 BPM

## 2022-09-07 DIAGNOSIS — R07.9 CHEST PAIN: ICD-10-CM

## 2022-09-07 DIAGNOSIS — R06.00 DYSPNEA, UNSPECIFIED TYPE: Primary | ICD-10-CM

## 2022-09-07 LAB
ALBUMIN SERPL-MCNC: 3.1 GM/DL (ref 3.5–5)
ALBUMIN/GLOB SERPL: 0.8 RATIO (ref 1.1–2)
ALP SERPL-CCNC: 46 UNIT/L (ref 40–150)
ALT SERPL-CCNC: 8 UNIT/L (ref 0–55)
AST SERPL-CCNC: 8 UNIT/L (ref 5–34)
BASOPHILS # BLD AUTO: 0.05 X10(3)/MCL (ref 0–0.2)
BASOPHILS NFR BLD AUTO: 0.5 %
BILIRUBIN DIRECT+TOT PNL SERPL-MCNC: 0.3 MG/DL
BNP BLD-MCNC: <10 PG/ML
BUN SERPL-MCNC: 10.1 MG/DL (ref 7–18.7)
CALCIUM SERPL-MCNC: 9.1 MG/DL (ref 8.4–10.2)
CHLORIDE SERPL-SCNC: 109 MMOL/L (ref 98–107)
CO2 SERPL-SCNC: 20 MMOL/L (ref 22–29)
CREAT SERPL-MCNC: 0.67 MG/DL (ref 0.55–1.02)
EOSINOPHIL # BLD AUTO: 0.66 X10(3)/MCL (ref 0–0.9)
EOSINOPHIL NFR BLD AUTO: 6.1 %
ERYTHROCYTE [DISTWIDTH] IN BLOOD BY AUTOMATED COUNT: 13.5 % (ref 11.5–17)
GFR SERPLBLD CREATININE-BSD FMLA CKD-EPI: >60 MLS/MIN/1.73/M2
GLOBULIN SER-MCNC: 4.1 GM/DL (ref 2.4–3.5)
GLUCOSE SERPL-MCNC: 124 MG/DL (ref 74–100)
HCT VFR BLD AUTO: 35.1 % (ref 37–47)
HGB BLD-MCNC: 11 GM/DL (ref 12–16)
IMM GRANULOCYTES # BLD AUTO: 0.08 X10(3)/MCL (ref 0–0.04)
IMM GRANULOCYTES NFR BLD AUTO: 0.7 %
INR BLD: 0.96 (ref 0–1.3)
LYMPHOCYTES # BLD AUTO: 0.98 X10(3)/MCL (ref 0.6–4.6)
LYMPHOCYTES NFR BLD AUTO: 9 %
MCH RBC QN AUTO: 29.6 PG (ref 27–31)
MCHC RBC AUTO-ENTMCNC: 31.3 MG/DL (ref 33–36)
MCV RBC AUTO: 94.6 FL (ref 80–94)
MONOCYTES # BLD AUTO: 0.35 X10(3)/MCL (ref 0.1–1.3)
MONOCYTES NFR BLD AUTO: 3.2 %
NEUTROPHILS # BLD AUTO: 8.8 X10(3)/MCL (ref 2.1–9.2)
NEUTROPHILS NFR BLD AUTO: 80.5 %
NRBC BLD AUTO-RTO: 0 %
PLATELET # BLD AUTO: 226 X10(3)/MCL (ref 130–400)
PMV BLD AUTO: 10 FL (ref 7.4–10.4)
POTASSIUM SERPL-SCNC: 4.1 MMOL/L (ref 3.5–5.1)
PROT SERPL-MCNC: 7.2 GM/DL (ref 6.4–8.3)
PROTHROMBIN TIME: 12.7 SECONDS (ref 12.5–14.5)
RBC # BLD AUTO: 3.71 X10(6)/MCL (ref 4.2–5.4)
SODIUM SERPL-SCNC: 135 MMOL/L (ref 136–145)
TROPONIN I SERPL-MCNC: <0.01 NG/ML (ref 0–0.04)
WBC # SPEC AUTO: 10.9 X10(3)/MCL (ref 4.5–11.5)

## 2022-09-07 PROCEDURE — 99285 EMERGENCY DEPT VISIT HI MDM: CPT | Mod: 25

## 2022-09-07 PROCEDURE — 85025 COMPLETE CBC W/AUTO DIFF WBC: CPT | Performed by: STUDENT IN AN ORGANIZED HEALTH CARE EDUCATION/TRAINING PROGRAM

## 2022-09-07 PROCEDURE — 93010 EKG 12-LEAD: ICD-10-PCS | Mod: ,,, | Performed by: INTERNAL MEDICINE

## 2022-09-07 PROCEDURE — 84484 ASSAY OF TROPONIN QUANT: CPT | Performed by: STUDENT IN AN ORGANIZED HEALTH CARE EDUCATION/TRAINING PROGRAM

## 2022-09-07 PROCEDURE — 36415 COLL VENOUS BLD VENIPUNCTURE: CPT | Performed by: STUDENT IN AN ORGANIZED HEALTH CARE EDUCATION/TRAINING PROGRAM

## 2022-09-07 PROCEDURE — 80053 COMPREHEN METABOLIC PANEL: CPT | Performed by: STUDENT IN AN ORGANIZED HEALTH CARE EDUCATION/TRAINING PROGRAM

## 2022-09-07 PROCEDURE — 93005 ELECTROCARDIOGRAM TRACING: CPT

## 2022-09-07 PROCEDURE — 93010 ELECTROCARDIOGRAM REPORT: CPT | Mod: ,,, | Performed by: INTERNAL MEDICINE

## 2022-09-07 PROCEDURE — 83880 ASSAY OF NATRIURETIC PEPTIDE: CPT | Performed by: STUDENT IN AN ORGANIZED HEALTH CARE EDUCATION/TRAINING PROGRAM

## 2022-09-07 PROCEDURE — 85610 PROTHROMBIN TIME: CPT | Performed by: STUDENT IN AN ORGANIZED HEALTH CARE EDUCATION/TRAINING PROGRAM

## 2022-09-08 NOTE — ED PROVIDER NOTES
Encounter Date: 9/7/2022       History     Chief Complaint   Patient presents with    Shortness of Breath     C/O SOB X ONE DAY. HX OF ASTHMA. DENIES CP. STATES OUT OF ONE OF HER INHALERS. REPORTS DRY COUGH.       Patient is a 37-year-old female  that presents with shortness of breath that has been present today. Associated symptoms none. Surrounding information is currently 19 weeks pregnant. Exacerbated by nothing. Relieved by nothing. Patient treatment prior to arrival none. Risk factors include none. Other history pertaining to this complaint short of breath resolved in the ER.       The history is provided by the patient. No  was used.   Review of patient's allergies indicates:  No Known Allergies  Past Medical History:   Diagnosis Date    Crohn's colitis      History reviewed. No pertinent surgical history.  History reviewed. No pertinent family history.  Social History     Tobacco Use    Smoking status: Never    Smokeless tobacco: Never   Substance Use Topics    Alcohol use: Not Currently    Drug use: Never     Review of Systems   Constitutional:  Negative for fever.   Respiratory:  Positive for shortness of breath. Negative for cough.    Cardiovascular:  Negative for chest pain.   Gastrointestinal:  Negative for abdominal pain.   Genitourinary:  Negative for difficulty urinating and dysuria.   Musculoskeletal:  Negative for gait problem.   Skin:  Negative for color change.   Neurological:  Negative for dizziness, speech difficulty and headaches.   Psychiatric/Behavioral:  Negative for hallucinations and suicidal ideas.    All other systems reviewed and are negative.    Physical Exam     Initial Vitals [09/07/22 2140]   BP Pulse Resp Temp SpO2   116/62 101 18 97.7 °F (36.5 °C) 96 %      MAP       --         Physical Exam    Nursing note and vitals reviewed.  Constitutional: She appears well-developed and well-nourished.   HENT:   Head: Normocephalic.   Eyes: EOM are normal.   Neck:    Normal range of motion.  Cardiovascular:  Normal rate, regular rhythm, normal heart sounds and intact distal pulses.           Pulmonary/Chest: Breath sounds normal. No respiratory distress.   Abdominal: Abdomen is soft. Bowel sounds are normal. There is no abdominal tenderness.   Musculoskeletal:         General: Normal range of motion.      Cervical back: Normal range of motion.     Neurological: She is alert and oriented to person, place, and time. She has normal strength.   Skin: Skin is warm and dry.   Psychiatric: She has a normal mood and affect. Her behavior is normal. Judgment and thought content normal.       ED Course   Procedures  Labs Reviewed   COMPREHENSIVE METABOLIC PANEL - Abnormal; Notable for the following components:       Result Value    Sodium Level 135 (*)     Chloride 109 (*)     Carbon Dioxide 20 (*)     Glucose Level 124 (*)     Albumin Level 3.1 (*)     Globulin 4.1 (*)     Albumin/Globulin Ratio 0.8 (*)     All other components within normal limits   CBC WITH DIFFERENTIAL - Abnormal; Notable for the following components:    RBC 3.71 (*)     Hgb 11.0 (*)     Hct 35.1 (*)     MCV 94.6 (*)     MCHC 31.3 (*)     IG# 0.08 (*)     All other components within normal limits   B-TYPE NATRIURETIC PEPTIDE - Normal   TROPONIN I - Normal   PROTIME-INR - Normal   CBC W/ AUTO DIFFERENTIAL    Narrative:     The following orders were created for panel order CBC auto differential.  Procedure                               Abnormality         Status                     ---------                               -----------         ------                     CBC with Differential[[id number]]        Abnormal            Final result                 Please view results for these tests on the individual orders.          Imaging Results              X-Ray Chest PA And Lateral (In process)  Result time 09/07/22 21:57:38                     Medications - No data to display                       Clinical Impression:    Final diagnoses:  [R07.9] Chest pain  [R06.00] Dyspnea, unspecified type (Primary)        ED Disposition Condition    Discharge Stable          ED Prescriptions    None       Follow-up Information       Follow up With Specialties Details Why Contact Info    Your Primary Care Provider  Call in 3 days ed follow up              ROSIO Reilly  09/07/22 1282

## 2022-10-21 ENCOUNTER — INFUSION (OUTPATIENT)
Dept: INFUSION THERAPY | Facility: HOSPITAL | Age: 37
End: 2022-10-21
Attending: INTERNAL MEDICINE
Payer: MEDICAID

## 2022-10-21 VITALS
DIASTOLIC BLOOD PRESSURE: 67 MMHG | RESPIRATION RATE: 18 BRPM | WEIGHT: 166.38 LBS | TEMPERATURE: 98 F | OXYGEN SATURATION: 97 % | BODY MASS INDEX: 30.62 KG/M2 | HEART RATE: 105 BPM | HEIGHT: 62 IN | SYSTOLIC BLOOD PRESSURE: 101 MMHG

## 2022-10-21 DIAGNOSIS — K50.10 CROHN'S DISEASE OF COLON WITHOUT COMPLICATION: Primary | ICD-10-CM

## 2022-10-21 PROCEDURE — 96376 TX/PRO/DX INJ SAME DRUG ADON: CPT

## 2022-10-21 PROCEDURE — 25000003 PHARM REV CODE 250: Performed by: INTERNAL MEDICINE

## 2022-10-21 PROCEDURE — 63600175 PHARM REV CODE 636 W HCPCS: Performed by: INTERNAL MEDICINE

## 2022-10-21 PROCEDURE — 96375 TX/PRO/DX INJ NEW DRUG ADDON: CPT

## 2022-10-21 PROCEDURE — 96413 CHEMO IV INFUSION 1 HR: CPT

## 2022-10-21 PROCEDURE — 96411 CHEMO IV PUSH ADDL DRUG: CPT

## 2022-10-21 RX ORDER — DIPHENHYDRAMINE HYDROCHLORIDE 50 MG/ML
25 INJECTION INTRAMUSCULAR; INTRAVENOUS
Status: CANCELLED | OUTPATIENT
Start: 2022-12-16

## 2022-10-21 RX ORDER — METHYLPREDNISOLONE SOD SUCC 125 MG
40 VIAL (EA) INJECTION
Status: ACTIVE | OUTPATIENT
Start: 2022-10-21 | End: 2022-10-21

## 2022-10-21 RX ORDER — ACETAMINOPHEN 325 MG/1
650 TABLET ORAL
Status: ACTIVE | OUTPATIENT
Start: 2022-10-21 | End: 2022-10-21

## 2022-10-21 RX ORDER — IPRATROPIUM BROMIDE AND ALBUTEROL SULFATE 2.5; .5 MG/3ML; MG/3ML
3 SOLUTION RESPIRATORY (INHALATION)
Status: CANCELLED | OUTPATIENT
Start: 2022-12-16

## 2022-10-21 RX ORDER — ACETAMINOPHEN 325 MG/1
650 TABLET ORAL
Status: CANCELLED | OUTPATIENT
Start: 2022-12-16

## 2022-10-21 RX ORDER — DIPHENHYDRAMINE HYDROCHLORIDE 50 MG/ML
25 INJECTION INTRAMUSCULAR; INTRAVENOUS
Status: COMPLETED | OUTPATIENT
Start: 2022-10-21 | End: 2022-10-21

## 2022-10-21 RX ORDER — DIPHENHYDRAMINE HYDROCHLORIDE 50 MG/ML
25 INJECTION INTRAMUSCULAR; INTRAVENOUS
Status: ACTIVE | OUTPATIENT
Start: 2022-10-21 | End: 2022-10-21

## 2022-10-21 RX ORDER — ACETAMINOPHEN 500 MG
500 TABLET ORAL
Status: CANCELLED | OUTPATIENT
Start: 2022-12-16

## 2022-10-21 RX ORDER — ACETAMINOPHEN 500 MG
500 TABLET ORAL
Status: COMPLETED | OUTPATIENT
Start: 2022-10-21 | End: 2022-10-21

## 2022-10-21 RX ORDER — EPINEPHRINE 1 MG/ML
0.3 INJECTION, SOLUTION, CONCENTRATE INTRAVENOUS
Status: ACTIVE | OUTPATIENT
Start: 2022-10-21 | End: 2022-10-21

## 2022-10-21 RX ORDER — EPINEPHRINE 1 MG/ML
0.3 INJECTION, SOLUTION, CONCENTRATE INTRAVENOUS
Status: CANCELLED | OUTPATIENT
Start: 2022-12-16

## 2022-10-21 RX ORDER — METHYLPREDNISOLONE SOD SUCC 125 MG
40 VIAL (EA) INJECTION
Status: DISCONTINUED | OUTPATIENT
Start: 2022-10-21 | End: 2022-10-21 | Stop reason: SDUPTHER

## 2022-10-21 RX ORDER — METHYLPREDNISOLONE SOD SUCC 125 MG
40 VIAL (EA) INJECTION
Status: CANCELLED | OUTPATIENT
Start: 2022-12-16

## 2022-10-21 RX ORDER — IPRATROPIUM BROMIDE AND ALBUTEROL SULFATE 2.5; .5 MG/3ML; MG/3ML
3 SOLUTION RESPIRATORY (INHALATION)
Status: ACTIVE | OUTPATIENT
Start: 2022-10-21 | End: 2022-10-21

## 2022-10-21 RX ORDER — METHYLPREDNISOLONE SOD SUCC 125 MG
40 VIAL (EA) INJECTION
Status: CANCELLED
Start: 2022-12-16

## 2022-10-21 RX ADMIN — METHYLPREDNISOLONE SODIUM SUCCINATE 40 MG: 40 INJECTION, POWDER, FOR SOLUTION INTRAMUSCULAR; INTRAVENOUS at 10:10

## 2022-10-21 RX ADMIN — SODIUM CHLORIDE 350 MG: 0.9 INJECTION, SOLUTION INTRAVENOUS at 10:10

## 2022-10-21 RX ADMIN — ACETAMINOPHEN 500 MG: 500 TABLET ORAL at 10:10

## 2022-10-21 RX ADMIN — DIPHENHYDRAMINE HYDROCHLORIDE 25 MG: 50 INJECTION INTRAMUSCULAR; INTRAVENOUS at 10:10

## 2022-10-21 RX ADMIN — SODIUM CHLORIDE: 9 INJECTION, SOLUTION INTRAVENOUS at 09:10

## 2022-11-07 DIAGNOSIS — J45.909 ASTHMA, UNSPECIFIED ASTHMA SEVERITY, UNSPECIFIED WHETHER COMPLICATED, UNSPECIFIED WHETHER PERSISTENT: Primary | ICD-10-CM

## 2022-11-07 DIAGNOSIS — R05.9 COUGH, UNSPECIFIED TYPE: ICD-10-CM

## 2022-11-07 DIAGNOSIS — R06.2 WHEEZING: ICD-10-CM

## 2022-11-21 ENCOUNTER — CLINICAL SUPPORT (OUTPATIENT)
Dept: GASTROENTEROLOGY | Facility: CLINIC | Age: 37
End: 2022-11-21
Payer: MEDICAID

## 2022-11-21 DIAGNOSIS — Z23 NEED FOR VACCINATION: Primary | ICD-10-CM

## 2022-11-21 PROCEDURE — 90471 IMMUNIZATION ADMIN: CPT | Mod: PBBFAC

## 2022-11-21 PROCEDURE — 99213 OFFICE O/P EST LOW 20 MIN: CPT | Mod: PBBFAC

## 2022-11-21 PROCEDURE — 90636 HEP A/HEP B VACC ADULT IM: CPT | Mod: PBBFAC

## 2022-11-21 RX ORDER — FERROUS SULFATE TAB 325 MG (65 MG ELEMENTAL FE) 325 (65 FE) MG
325 TAB ORAL DAILY
COMMUNITY
Start: 2022-10-18 | End: 2023-06-20

## 2022-11-21 RX ORDER — IPRATROPIUM BROMIDE AND ALBUTEROL SULFATE 2.5; .5 MG/3ML; MG/3ML
SOLUTION RESPIRATORY (INHALATION)
COMMUNITY
Start: 2022-09-30 | End: 2023-06-20

## 2022-11-21 RX ORDER — ALBUTEROL SULFATE 90 UG/1
2 AEROSOL, METERED RESPIRATORY (INHALATION) EVERY 4 HOURS PRN
COMMUNITY
Start: 2022-08-17

## 2022-11-21 RX ADMIN — HEPATITIS B VACCINE (RECOMBINANT) 20 MCG: 20 INJECTION, SUSPENSION INTRAMUSCULAR at 09:11

## 2022-12-16 ENCOUNTER — INFUSION (OUTPATIENT)
Dept: INFUSION THERAPY | Facility: HOSPITAL | Age: 37
End: 2022-12-16
Attending: INTERNAL MEDICINE
Payer: MEDICAID

## 2022-12-16 VITALS
RESPIRATION RATE: 18 BRPM | TEMPERATURE: 98 F | WEIGHT: 177.69 LBS | OXYGEN SATURATION: 96 % | SYSTOLIC BLOOD PRESSURE: 116 MMHG | HEART RATE: 97 BPM | BODY MASS INDEX: 32.7 KG/M2 | HEIGHT: 62 IN | DIASTOLIC BLOOD PRESSURE: 70 MMHG

## 2022-12-16 DIAGNOSIS — K50.10 CROHN'S DISEASE OF COLON WITHOUT COMPLICATION: Primary | ICD-10-CM

## 2022-12-16 PROCEDURE — 96413 CHEMO IV INFUSION 1 HR: CPT

## 2022-12-16 PROCEDURE — 96375 TX/PRO/DX INJ NEW DRUG ADDON: CPT

## 2022-12-16 PROCEDURE — 96376 TX/PRO/DX INJ SAME DRUG ADON: CPT

## 2022-12-16 PROCEDURE — 63600175 PHARM REV CODE 636 W HCPCS: Performed by: INTERNAL MEDICINE

## 2022-12-16 PROCEDURE — 25000003 PHARM REV CODE 250: Performed by: INTERNAL MEDICINE

## 2022-12-16 PROCEDURE — 96415 CHEMO IV INFUSION ADDL HR: CPT

## 2022-12-16 RX ORDER — METHYLPREDNISOLONE SOD SUCC 125 MG
40 VIAL (EA) INJECTION
Status: DISCONTINUED | OUTPATIENT
Start: 2022-12-16 | End: 2022-12-16 | Stop reason: HOSPADM

## 2022-12-16 RX ORDER — METHYLPREDNISOLONE SOD SUCC 125 MG
40 VIAL (EA) INJECTION
Status: ACTIVE | OUTPATIENT
Start: 2022-12-16 | End: 2022-12-16

## 2022-12-16 RX ORDER — ACETAMINOPHEN 325 MG/1
650 TABLET ORAL
Status: CANCELLED | OUTPATIENT
Start: 2023-02-10

## 2022-12-16 RX ORDER — ACETAMINOPHEN 500 MG
500 TABLET ORAL
Status: CANCELLED | OUTPATIENT
Start: 2023-02-10

## 2022-12-16 RX ORDER — DIPHENHYDRAMINE HYDROCHLORIDE 50 MG/ML
25 INJECTION INTRAMUSCULAR; INTRAVENOUS
Status: CANCELLED | OUTPATIENT
Start: 2023-02-10

## 2022-12-16 RX ORDER — DIPHENHYDRAMINE HYDROCHLORIDE 50 MG/ML
25 INJECTION INTRAMUSCULAR; INTRAVENOUS
Status: COMPLETED | OUTPATIENT
Start: 2022-12-16 | End: 2022-12-16

## 2022-12-16 RX ORDER — EPINEPHRINE 1 MG/ML
0.3 INJECTION, SOLUTION, CONCENTRATE INTRAVENOUS
Status: ACTIVE | OUTPATIENT
Start: 2022-12-16 | End: 2022-12-16

## 2022-12-16 RX ORDER — IPRATROPIUM BROMIDE AND ALBUTEROL SULFATE 2.5; .5 MG/3ML; MG/3ML
3 SOLUTION RESPIRATORY (INHALATION)
Status: ACTIVE | OUTPATIENT
Start: 2022-12-16 | End: 2022-12-16

## 2022-12-16 RX ORDER — ACETAMINOPHEN 325 MG/1
650 TABLET ORAL
Status: ACTIVE | OUTPATIENT
Start: 2022-12-16 | End: 2022-12-16

## 2022-12-16 RX ORDER — METHYLPREDNISOLONE SOD SUCC 125 MG
40 VIAL (EA) INJECTION
Status: CANCELLED | OUTPATIENT
Start: 2023-02-10

## 2022-12-16 RX ORDER — IPRATROPIUM BROMIDE AND ALBUTEROL SULFATE 2.5; .5 MG/3ML; MG/3ML
3 SOLUTION RESPIRATORY (INHALATION)
Status: CANCELLED | OUTPATIENT
Start: 2023-02-10

## 2022-12-16 RX ORDER — METHYLPREDNISOLONE SOD SUCC 125 MG
40 VIAL (EA) INJECTION
Status: CANCELLED
Start: 2023-02-10

## 2022-12-16 RX ORDER — ACETAMINOPHEN 500 MG
500 TABLET ORAL
Status: COMPLETED | OUTPATIENT
Start: 2022-12-16 | End: 2022-12-16

## 2022-12-16 RX ORDER — EPINEPHRINE 1 MG/ML
0.3 INJECTION, SOLUTION, CONCENTRATE INTRAVENOUS
Status: CANCELLED | OUTPATIENT
Start: 2023-02-10

## 2022-12-16 RX ORDER — DIPHENHYDRAMINE HYDROCHLORIDE 50 MG/ML
25 INJECTION INTRAMUSCULAR; INTRAVENOUS
Status: ACTIVE | OUTPATIENT
Start: 2022-12-16 | End: 2022-12-16

## 2022-12-16 RX ADMIN — SODIUM CHLORIDE: 9 INJECTION, SOLUTION INTRAVENOUS at 11:12

## 2022-12-16 RX ADMIN — METHYLPREDNISOLONE SODIUM SUCCINATE 40 MG: 40 INJECTION, POWDER, FOR SOLUTION INTRAMUSCULAR; INTRAVENOUS at 11:12

## 2022-12-16 RX ADMIN — SODIUM CHLORIDE 400 MG: 0.9 INJECTION, SOLUTION INTRAVENOUS at 12:12

## 2022-12-16 RX ADMIN — DIPHENHYDRAMINE HYDROCHLORIDE 25 MG: 50 INJECTION INTRAMUSCULAR; INTRAVENOUS at 11:12

## 2022-12-16 RX ADMIN — ACETAMINOPHEN 500 MG: 500 TABLET ORAL at 11:12

## 2023-01-10 ENCOUNTER — OFFICE VISIT (OUTPATIENT)
Dept: GASTROENTEROLOGY | Facility: CLINIC | Age: 38
End: 2023-01-10
Payer: MEDICAID

## 2023-01-10 VITALS
SYSTOLIC BLOOD PRESSURE: 110 MMHG | BODY MASS INDEX: 32.32 KG/M2 | TEMPERATURE: 98 F | HEIGHT: 62 IN | WEIGHT: 175.63 LBS | DIASTOLIC BLOOD PRESSURE: 73 MMHG | HEART RATE: 60 BPM | RESPIRATION RATE: 18 BRPM

## 2023-01-10 DIAGNOSIS — K50.10 CROHN'S DISEASE OF COLON WITHOUT COMPLICATION: Primary | ICD-10-CM

## 2023-01-10 LAB
ALBUMIN SERPL-MCNC: 2.7 G/DL (ref 3.5–5)
ALBUMIN/GLOB SERPL: 0.6 RATIO (ref 1.1–2)
ALP SERPL-CCNC: 142 UNIT/L (ref 40–150)
ALT SERPL-CCNC: 17 UNIT/L (ref 0–55)
AST SERPL-CCNC: 22 UNIT/L (ref 5–34)
BASOPHILS # BLD AUTO: 0.02 X10(3)/MCL (ref 0–0.2)
BASOPHILS NFR BLD AUTO: 0.3 %
BILIRUBIN DIRECT+TOT PNL SERPL-MCNC: 0.4 MG/DL
BUN SERPL-MCNC: 6.3 MG/DL (ref 7–18.7)
CALCIUM SERPL-MCNC: 8.9 MG/DL (ref 8.4–10.2)
CHLORIDE SERPL-SCNC: 104 MMOL/L (ref 98–107)
CO2 SERPL-SCNC: 22 MMOL/L (ref 22–29)
CREAT SERPL-MCNC: 0.62 MG/DL (ref 0.55–1.02)
CRP SERPL-MCNC: 2.9 MG/L
DEPRECATED CALCIDIOL+CALCIFEROL SERPL-MC: 30.8 NG/ML (ref 30–80)
EOSINOPHIL # BLD AUTO: 0.56 X10(3)/MCL (ref 0–0.9)
EOSINOPHIL NFR BLD AUTO: 8.7 %
ERYTHROCYTE [DISTWIDTH] IN BLOOD BY AUTOMATED COUNT: 15.3 % (ref 11.5–17)
FERRITIN SERPL-MCNC: 8.87 NG/ML (ref 4.63–204)
FOLATE SERPL-MCNC: 28.3 NG/ML (ref 7–31.4)
GFR SERPLBLD CREATININE-BSD FMLA CKD-EPI: >60 MLS/MIN/1.73/M2
GLOBULIN SER-MCNC: 4.8 GM/DL (ref 2.4–3.5)
GLUCOSE SERPL-MCNC: 69 MG/DL (ref 74–100)
HBV SURFACE AB SER-ACNC: 4.23 MIU/ML
HBV SURFACE AB SERPL IA-ACNC: NONREACTIVE M[IU]/ML
HBV SURFACE AG SERPL QL IA: NONREACTIVE
HCT VFR BLD AUTO: 30.9 % (ref 37–47)
HGB BLD-MCNC: 9.9 GM/DL (ref 12–16)
IMM GRANULOCYTES # BLD AUTO: 0.04 X10(3)/MCL (ref 0–0.04)
IMM GRANULOCYTES NFR BLD AUTO: 0.6 %
IRON SATN MFR SERPL: 7 % (ref 20–50)
IRON SERPL-MCNC: 43 UG/DL (ref 50–170)
LYMPHOCYTES # BLD AUTO: 1.58 X10(3)/MCL (ref 0.6–4.6)
LYMPHOCYTES NFR BLD AUTO: 24.6 %
MCH RBC QN AUTO: 27 PG
MCHC RBC AUTO-ENTMCNC: 32 MG/DL (ref 33–36)
MCV RBC AUTO: 84.2 FL (ref 80–94)
MONOCYTES # BLD AUTO: 0.43 X10(3)/MCL (ref 0.1–1.3)
MONOCYTES NFR BLD AUTO: 6.7 %
NEUTROPHILS # BLD AUTO: 3.8 X10(3)/MCL (ref 2.1–9.2)
NEUTROPHILS NFR BLD AUTO: 59.1 %
NRBC BLD AUTO-RTO: 0 %
PLATELET # BLD AUTO: 153 X10(3)/MCL (ref 130–400)
PMV BLD AUTO: 10.2 FL (ref 7.4–10.4)
POTASSIUM SERPL-SCNC: 3.8 MMOL/L (ref 3.5–5.1)
PROT SERPL-MCNC: 7.5 GM/DL (ref 6.4–8.3)
RBC # BLD AUTO: 3.67 X10(6)/MCL (ref 4.2–5.4)
SODIUM SERPL-SCNC: 135 MMOL/L (ref 136–145)
TIBC SERPL-MCNC: 532 UG/DL (ref 70–310)
TIBC SERPL-MCNC: 575 UG/DL (ref 250–450)
TRANSFERRIN SERPL-MCNC: 525 MG/DL (ref 180–382)
VIT B12 SERPL-MCNC: 299 PG/ML (ref 213–816)
WBC # SPEC AUTO: 6.4 X10(3)/MCL (ref 4.5–11.5)

## 2023-01-10 PROCEDURE — 82746 ASSAY OF FOLIC ACID SERUM: CPT | Performed by: INTERNAL MEDICINE

## 2023-01-10 PROCEDURE — 87340 HEPATITIS B SURFACE AG IA: CPT | Performed by: INTERNAL MEDICINE

## 2023-01-10 PROCEDURE — 84630 ASSAY OF ZINC: CPT | Performed by: INTERNAL MEDICINE

## 2023-01-10 PROCEDURE — 82728 ASSAY OF FERRITIN: CPT | Performed by: INTERNAL MEDICINE

## 2023-01-10 PROCEDURE — 82306 VITAMIN D 25 HYDROXY: CPT | Performed by: INTERNAL MEDICINE

## 2023-01-10 PROCEDURE — 83550 IRON BINDING TEST: CPT | Performed by: INTERNAL MEDICINE

## 2023-01-10 PROCEDURE — 85025 COMPLETE CBC W/AUTO DIFF WBC: CPT | Performed by: INTERNAL MEDICINE

## 2023-01-10 PROCEDURE — 86480 TB TEST CELL IMMUN MEASURE: CPT | Performed by: INTERNAL MEDICINE

## 2023-01-10 PROCEDURE — 99214 OFFICE O/P EST MOD 30 MIN: CPT | Mod: PBBFAC | Performed by: INTERNAL MEDICINE

## 2023-01-10 PROCEDURE — 82607 VITAMIN B-12: CPT | Performed by: INTERNAL MEDICINE

## 2023-01-10 PROCEDURE — 86706 HEP B SURFACE ANTIBODY: CPT | Performed by: INTERNAL MEDICINE

## 2023-01-10 PROCEDURE — 86140 C-REACTIVE PROTEIN: CPT | Performed by: INTERNAL MEDICINE

## 2023-01-10 PROCEDURE — 80053 COMPREHEN METABOLIC PANEL: CPT | Performed by: INTERNAL MEDICINE

## 2023-01-10 PROCEDURE — 36415 COLL VENOUS BLD VENIPUNCTURE: CPT | Performed by: INTERNAL MEDICINE

## 2023-01-10 RX ORDER — OMEPRAZOLE 20 MG/1
20 CAPSULE, DELAYED RELEASE ORAL DAILY
COMMUNITY
End: 2023-06-20

## 2023-01-10 RX ORDER — NEBULIZER AND COMPRESSOR
EACH MISCELLANEOUS
COMMUNITY

## 2023-01-10 RX ORDER — PROMETHAZINE HYDROCHLORIDE 25 MG/1
TABLET ORAL
COMMUNITY
End: 2023-06-20

## 2023-01-10 NOTE — PROGRESS NOTES
Inflammatory Bowel Disease        Established Patient Note         TODAY'S VISIT DATE:  1/10/2023  The patient's last visit with me was on Visit date not found.     PCP: Mary Carmen Ocampo      Referring MD:   No ref. provider found    History of Present Illness:  Shivani is a 37 year old with Crohn's colitis here for follow-up.     She was diagnosed with Crohn's colitis by Dr. Ann in January 2015 when she began experiencing diarrhea, abdominal pain, and weight loss. When she was diagnosed she was initially started on a steroid taper, balsalazide and when she did not improve, Humira was started. Fecal calprotectin in march 2018: 445. Adalimumab trough in May 2018 was 1.8, Ab<25, so azathioprine was added. She did not tolerate azathioprine per notes. Due to continued symptoms and low trough level, her Humira dose was increased to 40mg weekly in 2018. She was kept on balsalazide as well.     She did have a rectal abscess in the past, referred to surgery by Dr. Ann, and underwent I&D. No notes to review regarding this and findings.    Adalimumab level in October 2019 was 13, Ab<25. Unsure if this was trough level.    September 2020: Adalimumab trough: 5.7, Ab <25  September 2020: fecal calprotectin 936    When seen initially by me in September 2020, she was doing well clinically. She reported compliance with her Humira weekly. She was no longer taking balsalazide because she felt that it was unhelpful. She reported baseline 1-2 bowel movements a day with occasional hard stools with bloody streaks. She did report occasional breakthrough episodes of increased diarrhea and abdominal pain. She had never had a repeat colonoscopy since starting on Humira. I recommended a colonoscopy at that time to evaluate for response to her current therapy and mucosal healing. Despite several scheduled colonoscopies, she canceled each appointment and has not followed up till April 2022    She was  found to be pregnant in November 2021. She had a miscarriage during this pregnancy.    In April 2022 on follow-up she was having diarrhea, abdominal pain, weight loss.  She had not been on Humira due to prescription lapse for 3 months.  She was taking NSAIDs and was given prednisone 5mg by PCP which was not helpful. She denies heartburn or reflux.    April 2022 Colonoscopy: multiple scattered medium to large ulcerations throughout the colon.     I started her on infliximab (inflectra) and azathioprine in May 2022.  She has completed loading doses of infliximab.  The azathioprine made her have nausea, vomiting, diarrhea so she stopped taking it and symptoms resolved.  She completed a prednisone taper as well.     August 2022: Fecal calprotectin: 751    She currently is 40 WGA due date, January 15.  She is seeing M, Dr. Wendie Toure.  She has remained compliant with her infusions every 8 weeks.  Trough 3.1, Ab < 20 in August 2022.   She is now having 1 formed stool every other day.   She denies any rectal bleeding or abdominal pain.  Appetite is good and weight is stable.      She is having heartburn since she became pregnant.  She is taking otc prilosec or prevacid (she's not sure) which is helpful.     She does not smoke, no significant alcohol or recreational drug use. No FH of IBD.      IBD History:  IBD disease: Crohn's disease  Disease location: Colon    Current IBD Therapy:  Inflectra 5mg/kg every 8 weeks  (May 2022 - present)    Therapeutic Drug Monitoring Labs:  August 2022: IFX trough: 3.1, Ab < 20 (while pregnant)    Prior IBD Therapies:  Balsalazide  Humira 40mg weekly (2018- Jan 2022)  Prednisone  Azathioprine      Pertinent Endoscopy/Imaging:  January 7, 2015: Colonoscopy (Dr. Ann): Erythematous mucosa and cratered ulcerations in the ascending and transverse colon. Erythematous mucosa and white plaques in the sigmoid and descending colon. No comment on TI. Pathology: Focal colitis, moderate  activity, c/w IBD    April 29, 2022: Colonoscopy: skin tags, normal TI, Med to large patches of deep ulcerations surrounded by normal mucosa in the rectum, sigmoid, descending, transverse, and ascending.  Path: severe active chronic colitis with cryptitis, crypt abscess, dropout      Prior Pertinent Surgeries:   none    Complications:   none    Extraintestinal Manifestations:  None    Review of Systems   Constitutional:  Negative for appetite change and unexpected weight change.   HENT:  Negative for trouble swallowing.    Respiratory:  Negative for chest tightness.    Cardiovascular: Negative.    Gastrointestinal:  Positive for reflux. Negative for abdominal pain, change in bowel habit and change in bowel habit.   Musculoskeletal: Negative.    Neurological: Negative.    Psychiatric/Behavioral: Negative.     All other systems reviewed and are negative.       Medical/Surgical History:   Past Medical History:   Diagnosis Date    Crohn's colitis     Mild intermittent asthma, uncomplicated      Past Surgical History:   Procedure Laterality Date    COLONOSCOPY      FISTULA REPAIR      TONSILLECTOMY AND ADENOIDECTOMY           Family History:   Family History   Problem Relation Age of Onset    Diabetes Father     Diabetes Maternal Grandmother     Alzheimer's disease Paternal Grandmother         Social History:   Social History     Socioeconomic History    Marital status: Single   Tobacco Use    Smoking status: Never    Smokeless tobacco: Never   Substance and Sexual Activity    Alcohol use: Not Currently    Drug use: Never    Sexual activity: Yes     Partners: Male     Birth control/protection: None     Comment: Currently pregnant        Review of patient's allergies indicates:  No Known Allergies    Current Medications:   Outpatient Medications Marked as Taking for the 1/10/23 encounter (Office Visit) with Mary Blanton MD   Medication Sig Dispense Refill    albuterol (PROVENTIL) 2.5 mg /3 mL (0.083 %) nebulizer  "solution Inhale 2.5 mg into the lungs as needed.      albuterol (PROVENTIL/VENTOLIN HFA) 90 mcg/actuation inhaler Inhale 2 puffs into the lungs every 4 (four) hours as needed.      albuterol-ipratropium (DUO-NEB) 2.5 mg-0.5 mg/3 mL nebulizer solution USE 1 VIAL VIA NEBULIZER EVERY 6 HOURS AS NEEDED FOR WHEEZING      FEROSUL 325 mg (65 mg iron) Tab tablet Take 325 mg by mouth once daily.      nebulizer and compressor Manju Comp-Air XLT Compressor for Nebulizer   AS DIRECTED      omeprazole (PRILOSEC) 20 MG capsule Take 20 mg by mouth once daily.      promethazine (PHENERGAN) 25 MG tablet promethazine 25 mg tablet   TAKE 1 TABLET BY MOUTH EVERY 6 HOURS AS NEEDED FOR NAUSEA FOR UP TO 7 DAYS      SYMBICORT 160-4.5 mcg/actuation HFAA Inhale 2 puffs into the lungs every 12 (twelve) hours.      triamcinolone (KENALOG) 0.5 % ointment Apply topically 2 (two) times daily.          Vital Signs:  /73 (BP Location: Left arm, Patient Position: Sitting, BP Method: Medium (Automatic))   Pulse 60   Temp 98.1 °F (36.7 °C) (Oral)   Resp 18   Ht 5' 2" (1.575 m)   Wt 79.7 kg (175 lb 9.6 oz)   BMI 32.12 kg/m²      Physical Exam  Constitutional:       Appearance: Normal appearance.   HENT:      Head: Normocephalic and atraumatic.      Mouth/Throat:      Mouth: Mucous membranes are moist.   Eyes:      Conjunctiva/sclera: Conjunctivae normal.   Cardiovascular:      Rate and Rhythm: Normal rate and regular rhythm.   Pulmonary:      Effort: Pulmonary effort is normal.      Breath sounds: Normal breath sounds.   Abdominal:      General: Bowel sounds are normal.      Palpations: Abdomen is soft.   Musculoskeletal:         General: Normal range of motion.      Cervical back: Normal range of motion.   Skin:     General: Skin is warm.   Neurological:      General: No focal deficit present.      Mental Status: She is alert and oriented to person, place, and time. Mental status is at baseline.   Psychiatric:         Mood and Affect: Mood " normal.         Behavior: Behavior normal.       Labs: Reviewed  Hgb   Date Value Ref Range Status   01/10/2023 9.9 (L) 12.0 - 16.0 gm/dL Final     Albumin Level   Date Value Ref Range Status   01/10/2023 2.7 (L) 3.5 - 5.0 g/dL Final     Iron Level   Date Value Ref Range Status   01/10/2023 43 (L) 50 - 170 ug/dL Final     Ferritin Level   Date Value Ref Range Status   01/10/2023 8.87 4.63 - 204.00 ng/mL Final     Folate Level   Date Value Ref Range Status   01/10/2023 28.3 7.0 - 31.4 ng/mL Final     Vitamin B12 Level   Date Value Ref Range Status   01/10/2023 299 213 - 816 pg/mL Final     Vit D 25 OH   Date Value Ref Range Status   01/10/2023 30.8 30.0 - 80.0 ng/mL Final     Zinc   Date Value Ref Range Status   01/10/2023 49 (L) 60 - 106 mcg/dL Final     Comment:        -------------------ADDITIONAL INFORMATION-------------------  This test was developed and its performance characteristics   determined by [facility] in a manner consistent with CLIA   requirements. This test has not been cleared or approved by   the U.S. Food and Drug Administration.     Test Performed by:  [facility] [place] [address]  : Neal Bae M.D. Ph.D.; CLIA# [id number]     C-Reactive Protein   Date Value Ref Range Status   01/10/2023 2.90 <5.00 mg/L Final     Calprotectin, F   Date Value Ref Range Status   08/19/2022 751 (H) <50.0 (Normal) mcg/g Final     Comment:     Interpretation: Abnormal (>120 mcg/g)     -------------------ADDITIONAL INFORMATION-------------------  On 12/16/2021, [facility] [place] implemented a new   fecal calprotectin method with an expanded measuring range.   If patient was tested on previous method and is undergoing   serial monitoring, rebaselining may be indicated.   Rebaselining, or testing the current sample on the previous   method, is available at no charge, subject to reagent   availability. The rebaseline result  will be added to this   report. Contact [facility] [employer] at [phone]   within 7 days of initial report issuance to request this   service. For [facility] patients, call [phone].     Test Performed by:  [facility]  [address] [place] [address]  : Neal Bae M.D. Ph.D.; CLIA# [id number]     Hepatitis B Surface Antigen   Date Value Ref Range Status   01/10/2023 Nonreactive Nonreactive Final      T spot: negative April 2022  Fecal calprotectin: March 2018: 445 --> Sept 2020: 936 --> August 2022: 751      Assessment/Plan:    Problem List Items Addressed This Visit          GI    Crohn's disease of colon without complication - Primary     Crohn's colitis based on initial colonoscopy in 2015.   Previously on Humira 40mg weekly, clinically seemed to improve but no endoscopic evaluation to assess for mucosal healing  Adalimumab trough 5.7, Ab < 25 when on Humira 40mg weekly back in Sept 2020  Stopped Humira early 2022 April 2022 Colonoscopy: active Crohn's colitis with scattered ulcerations  Inflectra 5mg/kg started May 2022.  Could not tolerate azathioprine  Clinical improvement on infliximab  Currently pregnant  Week 14 trough 3.1, Ab < 20 (while pregnant)  August 2022: fecal calprotectin 751  Scheduled for delivery soon  Recheck trough following delivery but suspect will need to increase infusions for goal trough > 10  Recheck fecal calprotectin  Labs today            Relevant Orders    CBC Auto Differential (Completed)    Comprehensive Metabolic Panel (Completed)    C-Reactive Protein (Completed)    Calprotectin, Stool    Iron and TIBC (Completed)    Ferritin (Completed)    Folate (Completed)    Vitamin B12 (Completed)    Vitamin D (Completed)    Zinc (Completed)    Hepatitis B Surface Ab, Qualitative (Completed)    Hepatitis B Surface Antigen (Completed)    Quantiferon Gold TB (Completed)       HEALTH MAINTENANCE:      Vaccinations:    Influenza (inactive):  recommended annually   PCV 13 (Prevnar): recommended  PPSV 23 (Pneumovax): 2-12 mos after PCV 13, repeat 5 years later and then after age 66 yo  Tetanus (TdaP): September 2010, recommended every 10 years  HPV (males and females ages 11-44 yo): N/A  Meningococcal: UTD, No risk factors   Hepatitis B: Completed HBV series.  Recheck HBsAb  Hepatitis A:  not immune   MMR (live vaccine): UTD    Chickenpox status/Varicella (live vaccine): immune, +Ab  Shingrix: recommended   COVID-19: recommend      Colorectal cancer risk:  Risk factors: > 8 years of disease, > 1/3 colon involved  - Distribution of colonic disease: > 1/3 of colon  - Year of symptom onset: 2015  - Colonoscopy every 1-3 years after endoscopic remission    Ophthalmologic exam recommended yearly  Dermatologic exam recommended yearly due to risk of skin cancer with IBD/meds    Bone health:  Calcium 9239-4336 mg daily and vitamin D 800 IU daily  Risk factors for osteopenia/osteoporosis: steroid use  Vitamin D: 13.4  Ergocalciferol 50,000 IU weekly x 12 weeks  DEXA scan: recommend    Females:  Gynecological exam yearly.      Smoking status: nonsmoker      Follow up: 3 months

## 2023-01-11 ENCOUNTER — TELEPHONE (OUTPATIENT)
Dept: GASTROENTEROLOGY | Facility: CLINIC | Age: 38
End: 2023-01-11
Payer: MEDICAID

## 2023-01-11 DIAGNOSIS — D50.9 IRON DEFICIENCY ANEMIA, UNSPECIFIED IRON DEFICIENCY ANEMIA TYPE: Primary | ICD-10-CM

## 2023-01-11 RX ORDER — METHYLPREDNISOLONE SOD SUCC 125 MG
125 VIAL (EA) INJECTION ONCE AS NEEDED
Status: CANCELLED | OUTPATIENT
Start: 2023-01-11

## 2023-01-11 RX ORDER — SODIUM CHLORIDE 0.9 % (FLUSH) 0.9 %
10 SYRINGE (ML) INJECTION
Status: CANCELLED | OUTPATIENT
Start: 2023-01-11

## 2023-01-11 RX ORDER — EPINEPHRINE 0.3 MG/.3ML
0.3 INJECTION SUBCUTANEOUS ONCE AS NEEDED
Status: CANCELLED | OUTPATIENT
Start: 2023-01-11

## 2023-01-11 RX ORDER — HEPARIN 100 UNIT/ML
500 SYRINGE INTRAVENOUS
Status: CANCELLED | OUTPATIENT
Start: 2023-01-11

## 2023-01-11 RX ORDER — DIPHENHYDRAMINE HYDROCHLORIDE 50 MG/ML
50 INJECTION INTRAMUSCULAR; INTRAVENOUS ONCE AS NEEDED
Status: CANCELLED | OUTPATIENT
Start: 2023-01-11

## 2023-01-11 NOTE — TELEPHONE ENCOUNTER
----- Message from Mary Blanton MD sent at 1/10/2023  5:44 PM CST -----  Would benefit from iron infusions for iron deficiency anemia.  She has not tolerated oral iron well while pregnant but should continue it for the time being.

## 2023-01-12 LAB
GAMMA INTERFERON BACKGROUND BLD IA-ACNC: 0.03 IU/ML
M TB IFN-G BLD-IMP: ABNORMAL
M TB IFN-G CD4+ BCKGRND COR BLD-ACNC: 0.01 IU/ML
M TB IFN-G CD4+CD8+ BCKGRND COR BLD-ACNC: -0.01 IU/ML
MITOGEN IGNF BCKGRD COR BLD-ACNC: 0.37 IU/ML
ZINC SERPL-MCNC: 49 MCG/DL (ref 60–106)

## 2023-01-26 ENCOUNTER — DOCUMENTATION ONLY (OUTPATIENT)
Dept: INFUSION THERAPY | Facility: HOSPITAL | Age: 38
End: 2023-01-26

## 2023-01-29 NOTE — ASSESSMENT & PLAN NOTE
Crohn's colitis based on initial colonoscopy in 2015.   Previously on Humira 40mg weekly, clinically seemed to improve but no endoscopic evaluation to assess for mucosal healing  Adalimumab trough 5.7, Ab < 25 when on Humira 40mg weekly back in Sept 2020  Stopped Humira early 2022 April 2022 Colonoscopy: active Crohn's colitis with scattered ulcerations  Inflectra 5mg/kg started May 2022.  Could not tolerate azathioprine  Clinical improvement on infliximab  Currently pregnant  Week 14 trough 3.1, Ab < 20 (while pregnant)  August 2022: fecal calprotectin 751  Scheduled for delivery soon  Recheck trough following delivery but suspect will need to increase infusions for goal trough > 10  Recheck fecal calprotectin  Labs today

## 2023-01-30 ENCOUNTER — TELEPHONE (OUTPATIENT)
Dept: GASTROENTEROLOGY | Facility: CLINIC | Age: 38
End: 2023-01-30
Payer: MEDICAID

## 2023-01-30 DIAGNOSIS — K50.10 CROHN'S DISEASE OF COLON WITHOUT COMPLICATION: Primary | ICD-10-CM

## 2023-01-30 NOTE — TELEPHONE ENCOUNTER
----- Message from Mary Blanton MD sent at 1/29/2023  2:23 PM CST -----  Please make sure patient get next infusion (canceled an appt - not sure if it was for iron or ifx).  Needs repeat trough now if it has been 8 weeks from last infusion.  Also needs to recheck quantiferon gold.  Thanks.

## 2023-01-30 NOTE — TELEPHONE ENCOUNTER
Spoke with pt. Advised to keep appointments for Iron infusions and that she will need labs prior to next Inflectra infusion. Verbalized understanding

## 2023-01-30 NOTE — TELEPHONE ENCOUNTER
----- Message from Brii Brito sent at 1/26/2023 11:11 AM CST -----  Regarding: question about iron infusions  Pt called she gave birth on 1/18 and is asking if she should move forward with her iron infusions. Her next one is [facility] 2/2. Pt can be reached @ [phone]          Thank You  Marni MORRELL

## 2023-02-02 ENCOUNTER — INFUSION (OUTPATIENT)
Dept: INFUSION THERAPY | Facility: HOSPITAL | Age: 38
End: 2023-02-02
Attending: INTERNAL MEDICINE
Payer: MEDICAID

## 2023-02-02 VITALS
BODY MASS INDEX: 29.08 KG/M2 | HEIGHT: 62 IN | RESPIRATION RATE: 20 BRPM | TEMPERATURE: 98 F | WEIGHT: 158 LBS | HEART RATE: 84 BPM | OXYGEN SATURATION: 96 % | SYSTOLIC BLOOD PRESSURE: 150 MMHG | DIASTOLIC BLOOD PRESSURE: 90 MMHG

## 2023-02-02 DIAGNOSIS — D50.9 IRON DEFICIENCY ANEMIA, UNSPECIFIED IRON DEFICIENCY ANEMIA TYPE: Primary | ICD-10-CM

## 2023-02-02 PROCEDURE — 25000003 PHARM REV CODE 250: Performed by: INTERNAL MEDICINE

## 2023-02-02 PROCEDURE — 96365 THER/PROPH/DIAG IV INF INIT: CPT

## 2023-02-02 PROCEDURE — 63600175 PHARM REV CODE 636 W HCPCS: Performed by: INTERNAL MEDICINE

## 2023-02-02 RX ORDER — SODIUM CHLORIDE 0.9 % (FLUSH) 0.9 %
10 SYRINGE (ML) INJECTION
Status: DISCONTINUED | OUTPATIENT
Start: 2023-02-02 | End: 2023-02-02 | Stop reason: HOSPADM

## 2023-02-02 RX ORDER — HEPARIN 100 UNIT/ML
500 SYRINGE INTRAVENOUS
Status: DISCONTINUED | OUTPATIENT
Start: 2023-02-02 | End: 2023-02-02 | Stop reason: HOSPADM

## 2023-02-02 RX ORDER — SODIUM CHLORIDE 0.9 % (FLUSH) 0.9 %
10 SYRINGE (ML) INJECTION
Status: CANCELLED | OUTPATIENT
Start: 2023-02-09

## 2023-02-02 RX ORDER — METHYLPREDNISOLONE SOD SUCC 125 MG
125 VIAL (EA) INJECTION ONCE AS NEEDED
Status: CANCELLED | OUTPATIENT
Start: 2023-02-09

## 2023-02-02 RX ORDER — EPINEPHRINE 0.3 MG/.3ML
0.3 INJECTION SUBCUTANEOUS ONCE AS NEEDED
Status: CANCELLED | OUTPATIENT
Start: 2023-02-09

## 2023-02-02 RX ORDER — HEPARIN 100 UNIT/ML
500 SYRINGE INTRAVENOUS
Status: CANCELLED | OUTPATIENT
Start: 2023-02-09

## 2023-02-02 RX ORDER — DIPHENHYDRAMINE HYDROCHLORIDE 50 MG/ML
50 INJECTION INTRAMUSCULAR; INTRAVENOUS ONCE AS NEEDED
Status: CANCELLED | OUTPATIENT
Start: 2023-02-09

## 2023-02-02 RX ADMIN — IRON SUCROSE 200 MG: 20 INJECTION, SOLUTION INTRAVENOUS at 12:02

## 2023-02-02 NOTE — NURSING
Pt presents for #1 venofer. Denies c/o other than being tired. Just had baby 2 weeks ago. Tolerated infusion well.

## 2023-02-04 LAB — CALPROTECTIN STL-MCNT: 155 MCG/G

## 2023-02-09 ENCOUNTER — CLINICAL SUPPORT (OUTPATIENT)
Dept: HEMATOLOGY/ONCOLOGY | Facility: CLINIC | Age: 38
End: 2023-02-09
Payer: MEDICAID

## 2023-02-09 ENCOUNTER — INFUSION (OUTPATIENT)
Dept: INFUSION THERAPY | Facility: HOSPITAL | Age: 38
End: 2023-02-09
Attending: INTERNAL MEDICINE
Payer: MEDICAID

## 2023-02-09 VITALS
HEIGHT: 62 IN | OXYGEN SATURATION: 96 % | WEIGHT: 153.69 LBS | DIASTOLIC BLOOD PRESSURE: 81 MMHG | BODY MASS INDEX: 28.28 KG/M2 | TEMPERATURE: 98 F | SYSTOLIC BLOOD PRESSURE: 102 MMHG | RESPIRATION RATE: 20 BRPM | HEART RATE: 88 BPM

## 2023-02-09 DIAGNOSIS — D50.9 IRON DEFICIENCY ANEMIA, UNSPECIFIED IRON DEFICIENCY ANEMIA TYPE: ICD-10-CM

## 2023-02-09 DIAGNOSIS — K50.10 CROHN'S DISEASE OF COLON WITHOUT COMPLICATION: ICD-10-CM

## 2023-02-09 DIAGNOSIS — K50.10 CROHN'S DISEASE OF COLON WITHOUT COMPLICATION: Primary | ICD-10-CM

## 2023-02-09 PROCEDURE — 96415 CHEMO IV INFUSION ADDL HR: CPT

## 2023-02-09 PROCEDURE — 63600175 PHARM REV CODE 636 W HCPCS: Performed by: INTERNAL MEDICINE

## 2023-02-09 PROCEDURE — 86480 TB TEST CELL IMMUN MEASURE: CPT

## 2023-02-09 PROCEDURE — 96367 TX/PROPH/DG ADDL SEQ IV INF: CPT

## 2023-02-09 PROCEDURE — 96413 CHEMO IV INFUSION 1 HR: CPT

## 2023-02-09 PROCEDURE — 82397 CHEMILUMINESCENT ASSAY: CPT

## 2023-02-09 PROCEDURE — 36415 COLL VENOUS BLD VENIPUNCTURE: CPT

## 2023-02-09 PROCEDURE — 25000003 PHARM REV CODE 250: Performed by: INTERNAL MEDICINE

## 2023-02-09 PROCEDURE — 96375 TX/PRO/DX INJ NEW DRUG ADDON: CPT

## 2023-02-09 RX ORDER — EPINEPHRINE 0.3 MG/.3ML
0.3 INJECTION SUBCUTANEOUS ONCE AS NEEDED
Status: CANCELLED | OUTPATIENT
Start: 2023-02-16

## 2023-02-09 RX ORDER — EPINEPHRINE 1 MG/ML
0.3 INJECTION, SOLUTION, CONCENTRATE INTRAVENOUS
Status: CANCELLED | OUTPATIENT
Start: 2023-04-06

## 2023-02-09 RX ORDER — ACETAMINOPHEN 325 MG/1
650 TABLET ORAL
Status: CANCELLED | OUTPATIENT
Start: 2023-04-06

## 2023-02-09 RX ORDER — HEPARIN 100 UNIT/ML
500 SYRINGE INTRAVENOUS
Status: DISCONTINUED | OUTPATIENT
Start: 2023-02-09 | End: 2023-02-09 | Stop reason: HOSPADM

## 2023-02-09 RX ORDER — DIPHENHYDRAMINE HYDROCHLORIDE 50 MG/ML
50 INJECTION INTRAMUSCULAR; INTRAVENOUS ONCE AS NEEDED
Status: CANCELLED | OUTPATIENT
Start: 2023-02-16

## 2023-02-09 RX ORDER — ACETAMINOPHEN 500 MG
500 TABLET ORAL
Status: COMPLETED | OUTPATIENT
Start: 2023-02-09 | End: 2023-02-09

## 2023-02-09 RX ORDER — METHYLPREDNISOLONE SOD SUCC 125 MG
40 VIAL (EA) INJECTION
Status: DISCONTINUED | OUTPATIENT
Start: 2023-02-09 | End: 2023-02-09 | Stop reason: HOSPADM

## 2023-02-09 RX ORDER — SODIUM CHLORIDE 0.9 % (FLUSH) 0.9 %
10 SYRINGE (ML) INJECTION
Status: DISCONTINUED | OUTPATIENT
Start: 2023-02-09 | End: 2023-02-09 | Stop reason: HOSPADM

## 2023-02-09 RX ORDER — IPRATROPIUM BROMIDE AND ALBUTEROL SULFATE 2.5; .5 MG/3ML; MG/3ML
3 SOLUTION RESPIRATORY (INHALATION)
Status: CANCELLED | OUTPATIENT
Start: 2023-04-06

## 2023-02-09 RX ORDER — DIPHENHYDRAMINE HYDROCHLORIDE 50 MG/ML
25 INJECTION INTRAMUSCULAR; INTRAVENOUS
Status: COMPLETED | OUTPATIENT
Start: 2023-02-09 | End: 2023-02-09

## 2023-02-09 RX ORDER — SODIUM CHLORIDE 0.9 % (FLUSH) 0.9 %
10 SYRINGE (ML) INJECTION
Status: CANCELLED | OUTPATIENT
Start: 2023-02-16

## 2023-02-09 RX ORDER — HEPARIN 100 UNIT/ML
500 SYRINGE INTRAVENOUS
Status: CANCELLED | OUTPATIENT
Start: 2023-02-16

## 2023-02-09 RX ORDER — ACETAMINOPHEN 500 MG
500 TABLET ORAL
Status: CANCELLED | OUTPATIENT
Start: 2023-04-06

## 2023-02-09 RX ORDER — DIPHENHYDRAMINE HYDROCHLORIDE 50 MG/ML
25 INJECTION INTRAMUSCULAR; INTRAVENOUS
Status: CANCELLED | OUTPATIENT
Start: 2023-04-06

## 2023-02-09 RX ORDER — METHYLPREDNISOLONE SOD SUCC 125 MG
125 VIAL (EA) INJECTION ONCE AS NEEDED
Status: CANCELLED | OUTPATIENT
Start: 2023-02-16

## 2023-02-09 RX ORDER — METHYLPREDNISOLONE SOD SUCC 125 MG
40 VIAL (EA) INJECTION
Status: CANCELLED | OUTPATIENT
Start: 2023-04-06

## 2023-02-09 RX ORDER — METHYLPREDNISOLONE SOD SUCC 125 MG
40 VIAL (EA) INJECTION
Status: CANCELLED
Start: 2023-04-06

## 2023-02-09 RX ADMIN — SODIUM CHLORIDE 360 MG: 9 INJECTION, SOLUTION INTRAVENOUS at 12:02

## 2023-02-09 RX ADMIN — SODIUM CHLORIDE: 9 INJECTION, SOLUTION INTRAVENOUS at 11:02

## 2023-02-09 RX ADMIN — DIPHENHYDRAMINE HYDROCHLORIDE 25 MG: 50 INJECTION INTRAMUSCULAR; INTRAVENOUS at 12:02

## 2023-02-09 RX ADMIN — ACETAMINOPHEN 500 MG: 500 TABLET ORAL at 12:02

## 2023-02-09 RX ADMIN — IRON SUCROSE 200 MG: 20 INJECTION, SOLUTION INTRAVENOUS at 11:02

## 2023-02-09 RX ADMIN — METHYLPREDNISOLONE SODIUM SUCCINATE 40 MG: 40 INJECTION, POWDER, FOR SOLUTION INTRAMUSCULAR; INTRAVENOUS at 12:02

## 2023-02-12 LAB
GAMMA INTERFERON BACKGROUND BLD IA-ACNC: 0.06 IU/ML
M TB IFN-G BLD-IMP: NEGATIVE
M TB IFN-G CD4+ BCKGRND COR BLD-ACNC: 0.01 IU/ML
M TB IFN-G CD4+CD8+ BCKGRND COR BLD-ACNC: 0.02 IU/ML
MITOGEN IGNF BCKGRD COR BLD-ACNC: 5.4 IU/ML

## 2023-02-13 LAB
CLINICAL BIOCHEMIST REVIEW: NORMAL
INFLIXIMAB SERPL-MCNC: 8.5 MCG/ML

## 2023-02-16 ENCOUNTER — INFUSION (OUTPATIENT)
Dept: INFUSION THERAPY | Facility: HOSPITAL | Age: 38
End: 2023-02-16
Attending: INTERNAL MEDICINE
Payer: MEDICAID

## 2023-02-16 VITALS
BODY MASS INDEX: 28.56 KG/M2 | HEART RATE: 82 BPM | DIASTOLIC BLOOD PRESSURE: 91 MMHG | RESPIRATION RATE: 18 BRPM | OXYGEN SATURATION: 98 % | HEIGHT: 62 IN | WEIGHT: 155.19 LBS | SYSTOLIC BLOOD PRESSURE: 118 MMHG | TEMPERATURE: 97 F

## 2023-02-16 DIAGNOSIS — D50.9 IRON DEFICIENCY ANEMIA, UNSPECIFIED IRON DEFICIENCY ANEMIA TYPE: Primary | ICD-10-CM

## 2023-02-16 PROCEDURE — 96365 THER/PROPH/DIAG IV INF INIT: CPT

## 2023-02-16 PROCEDURE — 25000003 PHARM REV CODE 250: Performed by: INTERNAL MEDICINE

## 2023-02-16 PROCEDURE — 63600175 PHARM REV CODE 636 W HCPCS: Performed by: INTERNAL MEDICINE

## 2023-02-16 RX ORDER — METHYLPREDNISOLONE SOD SUCC 125 MG
125 VIAL (EA) INJECTION ONCE AS NEEDED
Status: CANCELLED | OUTPATIENT
Start: 2023-02-23

## 2023-02-16 RX ORDER — EPINEPHRINE 0.3 MG/.3ML
0.3 INJECTION SUBCUTANEOUS ONCE AS NEEDED
Status: CANCELLED | OUTPATIENT
Start: 2023-02-23

## 2023-02-16 RX ORDER — SODIUM CHLORIDE 0.9 % (FLUSH) 0.9 %
10 SYRINGE (ML) INJECTION
Status: CANCELLED | OUTPATIENT
Start: 2023-02-23

## 2023-02-16 RX ORDER — HEPARIN 100 UNIT/ML
500 SYRINGE INTRAVENOUS
Status: CANCELLED | OUTPATIENT
Start: 2023-02-23

## 2023-02-16 RX ORDER — HEPARIN 100 UNIT/ML
500 SYRINGE INTRAVENOUS
Status: DISCONTINUED | OUTPATIENT
Start: 2023-02-16 | End: 2023-02-16 | Stop reason: HOSPADM

## 2023-02-16 RX ORDER — SODIUM CHLORIDE 0.9 % (FLUSH) 0.9 %
10 SYRINGE (ML) INJECTION
Status: DISCONTINUED | OUTPATIENT
Start: 2023-02-16 | End: 2023-02-16 | Stop reason: HOSPADM

## 2023-02-16 RX ORDER — DIPHENHYDRAMINE HYDROCHLORIDE 50 MG/ML
50 INJECTION INTRAMUSCULAR; INTRAVENOUS ONCE AS NEEDED
Status: CANCELLED | OUTPATIENT
Start: 2023-02-23

## 2023-02-16 RX ADMIN — IRON SUCROSE 200 MG: 20 INJECTION, SOLUTION INTRAVENOUS at 11:02

## 2023-02-16 RX ADMIN — SODIUM CHLORIDE: 9 INJECTION, SOLUTION INTRAVENOUS at 01:02

## 2023-02-23 ENCOUNTER — INFUSION (OUTPATIENT)
Dept: INFUSION THERAPY | Facility: HOSPITAL | Age: 38
End: 2023-02-23
Attending: INTERNAL MEDICINE
Payer: MEDICAID

## 2023-02-23 VITALS
DIASTOLIC BLOOD PRESSURE: 84 MMHG | SYSTOLIC BLOOD PRESSURE: 116 MMHG | HEART RATE: 73 BPM | TEMPERATURE: 99 F | HEIGHT: 62 IN | OXYGEN SATURATION: 97 % | BODY MASS INDEX: 28.56 KG/M2 | RESPIRATION RATE: 18 BRPM | WEIGHT: 155.19 LBS

## 2023-02-23 DIAGNOSIS — D50.9 IRON DEFICIENCY ANEMIA, UNSPECIFIED IRON DEFICIENCY ANEMIA TYPE: Primary | ICD-10-CM

## 2023-02-23 PROCEDURE — 63600175 PHARM REV CODE 636 W HCPCS: Performed by: INTERNAL MEDICINE

## 2023-02-23 PROCEDURE — 25000003 PHARM REV CODE 250: Performed by: INTERNAL MEDICINE

## 2023-02-23 PROCEDURE — 96365 THER/PROPH/DIAG IV INF INIT: CPT

## 2023-02-23 RX ORDER — HEPARIN 100 UNIT/ML
500 SYRINGE INTRAVENOUS
Status: CANCELLED | OUTPATIENT
Start: 2023-02-23

## 2023-02-23 RX ORDER — EPINEPHRINE 0.3 MG/.3ML
0.3 INJECTION SUBCUTANEOUS ONCE AS NEEDED
Status: DISCONTINUED | OUTPATIENT
Start: 2023-02-23 | End: 2023-02-23 | Stop reason: HOSPADM

## 2023-02-23 RX ORDER — SODIUM CHLORIDE 0.9 % (FLUSH) 0.9 %
10 SYRINGE (ML) INJECTION
Status: CANCELLED | OUTPATIENT
Start: 2023-02-23

## 2023-02-23 RX ORDER — METHYLPREDNISOLONE SOD SUCC 125 MG
125 VIAL (EA) INJECTION ONCE AS NEEDED
Status: DISCONTINUED | OUTPATIENT
Start: 2023-02-23 | End: 2023-02-23 | Stop reason: HOSPADM

## 2023-02-23 RX ORDER — METHYLPREDNISOLONE SOD SUCC 125 MG
125 VIAL (EA) INJECTION ONCE AS NEEDED
Status: CANCELLED | OUTPATIENT
Start: 2023-02-23

## 2023-02-23 RX ORDER — SODIUM CHLORIDE 0.9 % (FLUSH) 0.9 %
10 SYRINGE (ML) INJECTION
Status: DISCONTINUED | OUTPATIENT
Start: 2023-02-23 | End: 2023-02-23 | Stop reason: HOSPADM

## 2023-02-23 RX ORDER — DIPHENHYDRAMINE HYDROCHLORIDE 50 MG/ML
50 INJECTION INTRAMUSCULAR; INTRAVENOUS ONCE AS NEEDED
Status: DISCONTINUED | OUTPATIENT
Start: 2023-02-23 | End: 2023-02-23 | Stop reason: HOSPADM

## 2023-02-23 RX ORDER — EPINEPHRINE 0.3 MG/.3ML
0.3 INJECTION SUBCUTANEOUS ONCE AS NEEDED
Status: CANCELLED | OUTPATIENT
Start: 2023-02-23

## 2023-02-23 RX ORDER — HEPARIN 100 UNIT/ML
500 SYRINGE INTRAVENOUS
Status: DISCONTINUED | OUTPATIENT
Start: 2023-02-23 | End: 2023-02-23 | Stop reason: HOSPADM

## 2023-02-23 RX ORDER — DIPHENHYDRAMINE HYDROCHLORIDE 50 MG/ML
50 INJECTION INTRAMUSCULAR; INTRAVENOUS ONCE AS NEEDED
Status: CANCELLED | OUTPATIENT
Start: 2023-02-23

## 2023-02-23 RX ADMIN — SODIUM CHLORIDE: 9 INJECTION, SOLUTION INTRAVENOUS at 12:02

## 2023-02-23 RX ADMIN — IRON SUCROSE 200 MG: 20 INJECTION, SOLUTION INTRAVENOUS at 12:02

## 2023-04-06 ENCOUNTER — INFUSION (OUTPATIENT)
Dept: INFUSION THERAPY | Facility: HOSPITAL | Age: 38
End: 2023-04-06
Attending: INTERNAL MEDICINE
Payer: MEDICAID

## 2023-04-06 VITALS
SYSTOLIC BLOOD PRESSURE: 104 MMHG | OXYGEN SATURATION: 96 % | BODY MASS INDEX: 29.17 KG/M2 | DIASTOLIC BLOOD PRESSURE: 67 MMHG | HEIGHT: 62 IN | RESPIRATION RATE: 20 BRPM | WEIGHT: 158.5 LBS | TEMPERATURE: 98 F | HEART RATE: 79 BPM

## 2023-04-06 DIAGNOSIS — K50.10 CROHN'S DISEASE OF COLON WITHOUT COMPLICATION: Primary | ICD-10-CM

## 2023-04-06 PROCEDURE — 63600175 PHARM REV CODE 636 W HCPCS: Performed by: INTERNAL MEDICINE

## 2023-04-06 PROCEDURE — 96375 TX/PRO/DX INJ NEW DRUG ADDON: CPT

## 2023-04-06 PROCEDURE — 96413 CHEMO IV INFUSION 1 HR: CPT

## 2023-04-06 PROCEDURE — 25000003 PHARM REV CODE 250: Performed by: INTERNAL MEDICINE

## 2023-04-06 PROCEDURE — 96415 CHEMO IV INFUSION ADDL HR: CPT

## 2023-04-06 RX ORDER — METHYLPREDNISOLONE SOD SUCC 125 MG
40 VIAL (EA) INJECTION
Status: DISCONTINUED | OUTPATIENT
Start: 2023-04-06 | End: 2023-04-06 | Stop reason: HOSPADM

## 2023-04-06 RX ORDER — DIPHENHYDRAMINE HYDROCHLORIDE 50 MG/ML
25 INJECTION INTRAMUSCULAR; INTRAVENOUS
Status: CANCELLED | OUTPATIENT
Start: 2023-06-01

## 2023-04-06 RX ORDER — ACETAMINOPHEN 500 MG
500 TABLET ORAL
Status: COMPLETED | OUTPATIENT
Start: 2023-04-06 | End: 2023-04-06

## 2023-04-06 RX ORDER — METHYLPREDNISOLONE SOD SUCC 125 MG
40 VIAL (EA) INJECTION
Status: CANCELLED
Start: 2023-06-01

## 2023-04-06 RX ORDER — ACETAMINOPHEN 325 MG/1
650 TABLET ORAL
Status: CANCELLED | OUTPATIENT
Start: 2023-06-01

## 2023-04-06 RX ORDER — IPRATROPIUM BROMIDE AND ALBUTEROL SULFATE 2.5; .5 MG/3ML; MG/3ML
3 SOLUTION RESPIRATORY (INHALATION)
Status: CANCELLED | OUTPATIENT
Start: 2023-06-01

## 2023-04-06 RX ORDER — EPINEPHRINE 1 MG/ML
0.3 INJECTION, SOLUTION, CONCENTRATE INTRAVENOUS
Status: CANCELLED | OUTPATIENT
Start: 2023-06-01

## 2023-04-06 RX ORDER — METHYLPREDNISOLONE SOD SUCC 125 MG
40 VIAL (EA) INJECTION
Status: CANCELLED | OUTPATIENT
Start: 2023-06-01

## 2023-04-06 RX ORDER — DIPHENHYDRAMINE HYDROCHLORIDE 50 MG/ML
25 INJECTION INTRAMUSCULAR; INTRAVENOUS
Status: COMPLETED | OUTPATIENT
Start: 2023-04-06 | End: 2023-04-06

## 2023-04-06 RX ORDER — ACETAMINOPHEN 500 MG
500 TABLET ORAL
Status: CANCELLED | OUTPATIENT
Start: 2023-06-01

## 2023-04-06 RX ADMIN — METHYLPREDNISOLONE SODIUM SUCCINATE 40 MG: 40 INJECTION, POWDER, FOR SOLUTION INTRAMUSCULAR; INTRAVENOUS at 12:04

## 2023-04-06 RX ADMIN — DIPHENHYDRAMINE HYDROCHLORIDE 25 MG: 50 INJECTION INTRAMUSCULAR; INTRAVENOUS at 12:04

## 2023-04-06 RX ADMIN — SODIUM CHLORIDE: 9 INJECTION, SOLUTION INTRAVENOUS at 11:04

## 2023-04-06 RX ADMIN — ACETAMINOPHEN 500 MG: 500 TABLET, FILM COATED ORAL at 12:04

## 2023-04-06 RX ADMIN — SODIUM CHLORIDE 360 MG: 0.9 INJECTION, SOLUTION INTRAVENOUS at 12:04

## 2023-04-06 NOTE — NURSING
1140  Pt arrived for inflectra infusion q 8 weeks.  Denies any pain.  Does report diarrhea started last night.

## 2023-05-18 ENCOUNTER — INFUSION (OUTPATIENT)
Dept: INFUSION THERAPY | Facility: HOSPITAL | Age: 38
End: 2023-05-18
Attending: INTERNAL MEDICINE
Payer: MEDICAID

## 2023-05-18 VITALS
WEIGHT: 155.88 LBS | OXYGEN SATURATION: 96 % | RESPIRATION RATE: 20 BRPM | TEMPERATURE: 98 F | DIASTOLIC BLOOD PRESSURE: 97 MMHG | HEART RATE: 95 BPM | BODY MASS INDEX: 28.51 KG/M2 | SYSTOLIC BLOOD PRESSURE: 143 MMHG

## 2023-05-18 DIAGNOSIS — K50.10 CROHN'S DISEASE OF COLON WITHOUT COMPLICATION: ICD-10-CM

## 2023-05-18 RX ORDER — DIPHENHYDRAMINE HYDROCHLORIDE 50 MG/ML
25 INJECTION INTRAMUSCULAR; INTRAVENOUS
Status: CANCELLED | OUTPATIENT
Start: 2023-05-18

## 2023-05-18 RX ORDER — ACETAMINOPHEN 325 MG/1
650 TABLET ORAL
Status: CANCELLED | OUTPATIENT
Start: 2023-05-18

## 2023-05-18 RX ORDER — IPRATROPIUM BROMIDE AND ALBUTEROL SULFATE 2.5; .5 MG/3ML; MG/3ML
3 SOLUTION RESPIRATORY (INHALATION)
Status: CANCELLED | OUTPATIENT
Start: 2023-05-18

## 2023-05-18 RX ORDER — ACETAMINOPHEN 500 MG
500 TABLET ORAL
Status: CANCELLED | OUTPATIENT
Start: 2023-05-18

## 2023-05-18 RX ORDER — METHYLPREDNISOLONE SOD SUCC 125 MG
40 VIAL (EA) INJECTION
Status: CANCELLED
Start: 2023-06-01

## 2023-05-18 RX ORDER — EPINEPHRINE 1 MG/ML
0.3 INJECTION, SOLUTION, CONCENTRATE INTRAVENOUS
Status: CANCELLED | OUTPATIENT
Start: 2023-05-18

## 2023-05-18 RX ORDER — METHYLPREDNISOLONE SOD SUCC 125 MG
40 VIAL (EA) INJECTION
Status: CANCELLED | OUTPATIENT
Start: 2023-05-18

## 2023-05-18 NOTE — NURSING
0909  Pt here for inflectra q 8 week infusion.  Pt denies any pain or complaint.  Noticed plan was being activated too early.  Pt had been scheduled for 6 weeks instead of 8 weeks.  Pt r/s correctly for 6/1.  Pt very understanding of error.

## 2023-05-19 ENCOUNTER — TELEPHONE (OUTPATIENT)
Dept: GASTROENTEROLOGY | Facility: CLINIC | Age: 38
End: 2023-05-19
Payer: MEDICAID

## 2023-05-19 RX ORDER — DIPHENHYDRAMINE HYDROCHLORIDE 50 MG/ML
25 INJECTION INTRAMUSCULAR; INTRAVENOUS
Status: CANCELLED | OUTPATIENT
Start: 2023-06-01

## 2023-05-19 RX ORDER — IPRATROPIUM BROMIDE AND ALBUTEROL SULFATE 2.5; .5 MG/3ML; MG/3ML
3 SOLUTION RESPIRATORY (INHALATION)
Status: CANCELLED | OUTPATIENT
Start: 2023-06-01

## 2023-05-19 RX ORDER — METHYLPREDNISOLONE SOD SUCC 125 MG
40 VIAL (EA) INJECTION
Status: CANCELLED | OUTPATIENT
Start: 2023-06-01

## 2023-05-19 RX ORDER — ACETAMINOPHEN 325 MG/1
650 TABLET ORAL
Status: CANCELLED | OUTPATIENT
Start: 2023-06-01

## 2023-05-19 RX ORDER — EPINEPHRINE 1 MG/ML
0.3 INJECTION, SOLUTION, CONCENTRATE INTRAVENOUS
Status: CANCELLED | OUTPATIENT
Start: 2023-06-01

## 2023-05-19 RX ORDER — ACETAMINOPHEN 500 MG
500 TABLET ORAL
Status: CANCELLED | OUTPATIENT
Start: 2023-06-01

## 2023-06-01 ENCOUNTER — INFUSION (OUTPATIENT)
Dept: INFUSION THERAPY | Facility: HOSPITAL | Age: 38
End: 2023-06-01
Attending: INTERNAL MEDICINE
Payer: MEDICAID

## 2023-06-01 VITALS
HEIGHT: 62 IN | HEART RATE: 75 BPM | RESPIRATION RATE: 16 BRPM | DIASTOLIC BLOOD PRESSURE: 77 MMHG | OXYGEN SATURATION: 95 % | SYSTOLIC BLOOD PRESSURE: 113 MMHG | TEMPERATURE: 98 F | BODY MASS INDEX: 28.34 KG/M2 | WEIGHT: 154 LBS

## 2023-06-01 DIAGNOSIS — K50.10 CROHN'S DISEASE OF COLON WITHOUT COMPLICATION: Primary | ICD-10-CM

## 2023-06-01 PROCEDURE — 96415 CHEMO IV INFUSION ADDL HR: CPT

## 2023-06-01 PROCEDURE — 96375 TX/PRO/DX INJ NEW DRUG ADDON: CPT

## 2023-06-01 PROCEDURE — 96413 CHEMO IV INFUSION 1 HR: CPT

## 2023-06-01 PROCEDURE — 25000003 PHARM REV CODE 250: Performed by: INTERNAL MEDICINE

## 2023-06-01 PROCEDURE — 63600175 PHARM REV CODE 636 W HCPCS: Performed by: INTERNAL MEDICINE

## 2023-06-01 RX ORDER — IPRATROPIUM BROMIDE AND ALBUTEROL SULFATE 2.5; .5 MG/3ML; MG/3ML
3 SOLUTION RESPIRATORY (INHALATION)
Status: CANCELLED | OUTPATIENT
Start: 2023-07-27

## 2023-06-01 RX ORDER — METHYLPREDNISOLONE SOD SUCC 125 MG
40 VIAL (EA) INJECTION
Status: CANCELLED | OUTPATIENT
Start: 2023-07-27

## 2023-06-01 RX ORDER — ACETAMINOPHEN 325 MG/1
650 TABLET ORAL
Status: CANCELLED | OUTPATIENT
Start: 2023-07-27

## 2023-06-01 RX ORDER — EPINEPHRINE 1 MG/ML
0.3 INJECTION, SOLUTION, CONCENTRATE INTRAVENOUS
Status: CANCELLED | OUTPATIENT
Start: 2023-07-27

## 2023-06-01 RX ORDER — ACETAMINOPHEN 500 MG
500 TABLET ORAL
Status: COMPLETED | OUTPATIENT
Start: 2023-06-01 | End: 2023-06-01

## 2023-06-01 RX ORDER — DIPHENHYDRAMINE HYDROCHLORIDE 50 MG/ML
25 INJECTION INTRAMUSCULAR; INTRAVENOUS
Status: COMPLETED | OUTPATIENT
Start: 2023-06-01 | End: 2023-06-01

## 2023-06-01 RX ORDER — METHYLPREDNISOLONE SOD SUCC 125 MG
40 VIAL (EA) INJECTION ONCE
Status: DISCONTINUED | OUTPATIENT
Start: 2023-06-01 | End: 2023-06-01 | Stop reason: HOSPADM

## 2023-06-01 RX ORDER — METHYLPREDNISOLONE SOD SUCC 125 MG
40 VIAL (EA) INJECTION
Status: CANCELLED
Start: 2023-07-27

## 2023-06-01 RX ORDER — DIPHENHYDRAMINE HYDROCHLORIDE 50 MG/ML
25 INJECTION INTRAMUSCULAR; INTRAVENOUS
Status: CANCELLED | OUTPATIENT
Start: 2023-07-27

## 2023-06-01 RX ORDER — ACETAMINOPHEN 500 MG
500 TABLET ORAL
Status: CANCELLED | OUTPATIENT
Start: 2023-07-27

## 2023-06-01 RX ADMIN — SODIUM CHLORIDE: 9 INJECTION, SOLUTION INTRAVENOUS at 10:06

## 2023-06-01 RX ADMIN — SODIUM CHLORIDE 350 MG: 0.9 INJECTION, SOLUTION INTRAVENOUS at 11:06

## 2023-06-01 RX ADMIN — DIPHENHYDRAMINE HYDROCHLORIDE 25 MG: 50 INJECTION INTRAMUSCULAR; INTRAVENOUS at 10:06

## 2023-06-01 RX ADMIN — ACETAMINOPHEN 500 MG: 500 TABLET, FILM COATED ORAL at 10:06

## 2023-06-01 RX ADMIN — METHYLPREDNISOLONE SODIUM SUCCINATE 40 MG: 40 INJECTION, POWDER, FOR SOLUTION INTRAMUSCULAR; INTRAVENOUS at 10:06

## 2023-06-19 NOTE — PROGRESS NOTES
Inflammatory Bowel Disease        Established Patient Note         TODAY'S VISIT DATE:  6/22/2023  The patient's last visit with me was on Visit date not found.     PCP: Mary Carmen Ocampo      Referring MD:   No ref. provider found    History of Present Illness:  Shivani is a 38 year old with Crohn's colitis here for follow-up.     She was diagnosed with Crohn's colitis by Dr. Ann in January 2015 when she began experiencing diarrhea, abdominal pain, and weight loss. When she was diagnosed she was initially started on a steroid taper, balsalazide and when she did not improve, Humira was started. Fecal calprotectin in march 2018: 445. Adalimumab trough in May 2018 was 1.8, Ab<25, so azathioprine was added. She did not tolerate azathioprine per notes. Due to continued symptoms and low trough level, her Humira dose was increased to 40mg weekly in 2018. She was kept on balsalazide as well.     She did have a rectal abscess in the past, referred to surgery by Dr. Ann, and underwent I&D. No notes to review regarding this and findings.    Adalimumab level in October 2019 was 13, Ab<25. Unsure if this was trough level.     September 2020: Adalimumab trough: 5.7, Ab <25  September 2020: fecal calprotectin 936    When seen initially by me in September 2020, she was doing well clinically. She reported compliance with her Humira weekly. She was no longer taking balsalazide because she felt that it was unhelpful. She reported baseline 1-2 bowel movements a day with occasional hard stools with bloody streaks. She did report occasional breakthrough episodes of increased diarrhea and abdominal pain. She had never had a repeat colonoscopy since starting on Humira. I recommended a colonoscopy at that time to evaluate for response to her current therapy and mucosal healing. Despite several scheduled colonoscopies, she canceled each appointment and has not followed up till April 2022    She was  found to be pregnant in November 2021. She had a miscarriage during this pregnancy.     In April 2022 on follow-up she was having diarrhea, abdominal pain, weight loss.  She had not been on Humira due to prescription lapse for 3 months.  She was taking NSAIDs and was given prednisone 5mg by PCP which was not helpful. She denies heartburn or reflux.     April 2022 Colonoscopy: multiple scattered medium to large ulcerations throughout the colon.      I started her on infliximab (inflectra) and azathioprine in May 2022.  She has completed loading doses of infliximab.  The azathioprine made her have nausea, vomiting, diarrhea so she stopped taking it and symptoms resolved.  She completed a prednisone taper as well.      August 2022: Fecal calprotectin: 751     She currently is 40 WGA due date, January 15.  She is seeing MFM, Dr. Wendie Toure.  She has remained compliant with her infusions every 8 weeks.  Trough 3.1, Ab < 20 in August 2022.   She is now having 1 formed stool every other day.   She denies any rectal bleeding or abdominal pain.  Appetite is good and weight is stable.       She is having heartburn since she became pregnant.  She is taking otc prilosec or prevacid (she's not sure) which is helpful.     She does not smoke, no significant alcohol or recreational drug use. No FH of IBD.    Today, she reports doing well overall. She delivered a healthy son via a  vaginal delivery on 1/18/23. Before her last Inflectra infusion on 6/1/23, she had diarrhea which lasted approximately 5-6 days and irritated her rectum from having multiple stools per day. Typically, she has a formed bowel movement every 2-3 days with no blood or mucus. Occasionally, she will have a hard stool which will cause BRB when she wipes and sometimes on the stool. After eating spicy food 2 days ago, she had diarrhea. Stools were soft and getting back to normal yesterday. No stools so far today. She reports irritation to her rectum  after having multiple bowel movements. She looked at her rectum with a mirror but she wasn't sure what she was looking at.  Denies history of hemorrhoids. Denies abdominal pain. She will have occasional stomach cramps when needing to have a bowel movement or after eating something spicy. Denies feelings of incomplete emptying. Good appetite. Weights stable after delivery of son. She is eating and drinking well.       IBD History:  IBD disease: Crohn's disease  Disease location: Colon     Current IBD Therapy:  Inflectra 5mg/kg every 8 weeks  (May 2022 - present)     Therapeutic Drug Monitoring Labs:  August 2022: IFX trough: 3.1, Ab < 20 (while pregnant)     Prior IBD Therapies:  Balsalazide  Humira 40mg weekly (2018- Jan 2022)  Prednisone  Azathioprine        Pertinent Endoscopy/Imaging:  January 7, 2015: Colonoscopy (Dr. Ann): Erythematous mucosa and cratered ulcerations in the ascending and transverse colon. Erythematous mucosa and white plaques in the sigmoid and descending colon. No comment on TI. Pathology: Focal colitis, moderate activity, c/w IBD     April 29, 2022: Colonoscopy: skin tags, normal TI, Med to large patches of deep ulcerations surrounded by normal mucosa in the rectum, sigmoid, descending, transverse, and ascending.  Path: severe active chronic colitis with cryptitis, crypt abscess, dropout        Prior Pertinent Surgeries:   none     Complications:   none     Extraintestinal Manifestations:  None  Review of Systems   Constitutional:  Negative for appetite change and unexpected weight change.   HENT:  Negative for trouble swallowing.    Respiratory:  Negative for chest tightness.    Cardiovascular: Negative.    Gastrointestinal:  Positive for diarrhea and rectal pain (irritation). Negative for abdominal pain, blood in stool, change in bowel habit, reflux and change in bowel habit.   Musculoskeletal: Negative.    Neurological: Negative.    Psychiatric/Behavioral: Negative.     All other  systems reviewed and are negative.       Medical/Surgical History:   Past Medical History:   Diagnosis Date    Crohn's colitis     Mild intermittent asthma, uncomplicated      Past Surgical History:   Procedure Laterality Date    COLONOSCOPY      FISTULA REPAIR      TONSILLECTOMY AND ADENOIDECTOMY           Family History:   Family History   Problem Relation Age of Onset    Diabetes Father     Diabetes Maternal Grandmother     Alzheimer's disease Paternal Grandmother         Social History:   Social History     Socioeconomic History    Marital status: Single   Tobacco Use    Smoking status: Never    Smokeless tobacco: Never   Substance and Sexual Activity    Alcohol use: Not Currently    Drug use: Never    Sexual activity: Yes     Partners: Male     Birth control/protection: OCP     Comment: Currently pregnant        Review of patient's allergies indicates:  No Known Allergies    Current Medications:   Outpatient Medications Marked as Taking for the 6/20/23 encounter (Office Visit) with ROSIO Mulligan   Medication Sig Dispense Refill    albuterol (PROVENTIL) 2.5 mg /3 mL (0.083 %) nebulizer solution Inhale 2.5 mg into the lungs as needed.      albuterol (PROVENTIL/VENTOLIN HFA) 90 mcg/actuation inhaler Inhale 2 puffs into the lungs every 4 (four) hours as needed.      CRYSELLE, 28, 0.3-30 mg-mcg per tablet Take 1 tablet by mouth once daily.      nebulizer and compressor Manju Comp-Air XLT Compressor for Nebulizer   AS DIRECTED      SYMBICORT 160-4.5 mcg/actuation HFAA Inhale 2 puffs into the lungs every 12 (twelve) hours.       Current Facility-Administered Medications for the 6/20/23 encounter (Office Visit) with ROSIO Mulligan   Medication Dose Route Frequency Provider Last Rate Last Admin    [START ON 7/27/2023] hepatitis A and B vaccine (PF) 720 RUFUS unit- 20 mcg/mL suspension 720 Units  1 mL Intramuscular 1 time in Clinic/HOD ROSIO Mulligan            Vital Signs:  /76 (BP  "Location: Right arm, Patient Position: Sitting, BP Method: Medium (Automatic))   Pulse 68   Temp 98.1 °F (36.7 °C) (Oral)   Resp 18   Ht 5' 2" (1.575 m)   Wt 68.8 kg (151 lb 9.6 oz)   LMP 06/11/2023 (Approximate)   Breastfeeding No   BMI 27.73 kg/m²      Physical Exam  Vitals reviewed. Exam conducted with a chaperone present (Mily Mariscal RN).   Constitutional:       Appearance: Normal appearance.   HENT:      Head: Normocephalic and atraumatic.      Mouth/Throat:      Mouth: Mucous membranes are moist.   Eyes:      General: No scleral icterus.     Conjunctiva/sclera: Conjunctivae normal.   Cardiovascular:      Rate and Rhythm: Normal rate and regular rhythm.   Pulmonary:      Effort: Pulmonary effort is normal.      Breath sounds: Normal breath sounds.   Abdominal:      General: Bowel sounds are normal.      Palpations: Abdomen is soft.      Tenderness: There is no abdominal tenderness.   Genitourinary:     Rectum: External hemorrhoid present. No mass, tenderness or anal fissure.   Musculoskeletal:         General: Normal range of motion.      Cervical back: Normal range of motion.   Skin:     General: Skin is warm.   Neurological:      General: No focal deficit present.      Mental Status: She is alert and oriented to person, place, and time. Mental status is at baseline.   Psychiatric:         Mood and Affect: Mood normal.         Behavior: Behavior normal.       Labs: Reviewed  Hgb   Date Value Ref Range Status   01/10/2023 9.9 (L) 12.0 - 16.0 gm/dL Final     Albumin Level   Date Value Ref Range Status   01/10/2023 2.7 (L) 3.5 - 5.0 g/dL Final     Iron Level   Date Value Ref Range Status   01/10/2023 43 (L) 50 - 170 ug/dL Final     Ferritin Level   Date Value Ref Range Status   01/10/2023 8.87 4.63 - 204.00 ng/mL Final     Folate Level   Date Value Ref Range Status   01/10/2023 28.3 7.0 - 31.4 ng/mL Final     Vitamin B12 Level   Date Value Ref Range Status   01/10/2023 299 213 - 816 pg/mL Final     Vit " D 25 OH   Date Value Ref Range Status   01/10/2023 30.8 30.0 - 80.0 ng/mL Final     Zinc   Date Value Ref Range Status   01/10/2023 49 (L) 60 - 106 mcg/dL Final     Comment:        -------------------ADDITIONAL INFORMATION-------------------  This test was developed and its performance characteristics   determined by [facility] in a manner consistent with CLIA   requirements. This test has not been cleared or approved by   the U.S. Food and Drug Administration.     Test Performed by:  [facility] [place] [address]  : Neal Bae M.D. Ph.D.; CLIA# [id number]     C-Reactive Protein   Date Value Ref Range Status   01/10/2023 2.90 <5.00 mg/L Final     Calprotectin, F   Date Value Ref Range Status   02/02/2023 155 (H) <50.0 (Normal) mcg/g Final     Comment:     Interpretation: Abnormal (>120 mcg/g)     -------------------ADDITIONAL INFORMATION-------------------  On 12/16/2021, [facility] implemented a new   fecal calprotectin method with an expanded measuring range.   If patient was tested on previous method and is undergoing   serial monitoring, rebaselining may be indicated.   Rebaselining, or testing the current sample on the previous   method, is available at no charge, subject to reagent   availability. The rebaseline result will be added to this   report. Contact [facility] at [phone]   within 7 days of initial report issuance to request this   service. For [facility] patients, call [phone].     Test Performed by:  [facility] [place] [address]  : Neal Bae M.D. Ph.D.; CLIA# [id number]     Hepatitis B Surface Antigen   Date Value Ref Range Status   01/10/2023 Nonreactive Nonreactive Final           Assessment/Plan:    Problem List Items Addressed This Visit          GI    Crohn's disease of colon without  complication - Primary     Crohn's colitis based on initial colonoscopy in 2015.   Previously on Humira 40mg weekly, clinically seemed to improve but no endoscopic evaluation to assess for mucosal healing  Adalimumab trough 5.7, Ab < 25 when on Humira 40mg weekly back in Sept 2020  Stopped Humira early 2022 April 2022 Colonoscopy: active Crohn's colitis with scattered ulcerations  Inflectra 5mg/kg started May 2022.  Could not tolerate azathioprine  Clinical improvement on infliximab  Week 14 trough 3.1, Ab < 20 (while pregnant)  August 2022: fecal calprotectin 751  January 2023: calprotectin 155, CRP 2.9  Postpartum: Delivered son on 1/18/2023 February 2023: trough level 8.5    Recheck fecal calprotectin  Labs with next infusion appointment on 7/27/2023. Will recheck trough but suspect will need to increase infusions for goal trough > 10           Relevant Orders    CBC Auto Differential    Comprehensive Metabolic Panel    C-Reactive Protein    Calprotectin, Stool    Iron and TIBC    Ferritin    Folate    Vitamin B12    Vitamin D    Zinc    External hemorrhoids     Recommend daily soluble fiber supplement.   Avoid straining to have bowel movements.   Sitz baths as needed.   May use tucks pads or preparation H as needed.           Other Visit Diagnoses       Immunization due        Not immune to Hep B despite completing series previously.   Recommend Hep A and repeat Hep B series.   NV for immunizations when here for infusion.     Relevant Medications    hepatitis A and B vaccine (PF) 720 RUFUS unit- 20 mcg/mL suspension 720 Units (Start on 7/27/2023 10:00 AM)          HEALTH MAINTENANCE:     Vaccinations:     Influenza (inactive):  recommended annually   PCV 13 (Prevnar): recommended  PPSV 23 (Pneumovax): 2-12 mos after PCV 13, repeat 5 years later and then after age 64 yo  Tetanus (TdaP): September 2010, recommended every 10 years  HPV (males and females ages 11-44 yo): N/A  Meningococcal: UTD, No risk factors    Hepatitis B: Completed HBV series.  not immune, will need to repeat series  Hepatitis A:  not immune   MMR (live vaccine): UTD    Chickenpox status/Varicella (live vaccine): immune, +Ab  Shingrix: recommended   COVID-19: recommend       Colorectal cancer risk:  Risk factors: > 8 years of disease, > 1/3 colon involved  - Distribution of colonic disease: > 1/3 of colon  - Year of symptom onset: 2015  - Colonoscopy every 1-3 years after endoscopic remission    Ophthalmologic exam recommended yearly  Dermatologic exam recommended yearly due to risk of skin cancer with IBD/meds    Bone health:  Calcium 0947-8231 mg daily and vitamin D 800 IU daily  Risk factors for osteopenia/osteoporosis: steroid use  Vitamin D: 30.8  DEXA scan: recommend    Females:  Gynecological exam yearly.        Smoking status: nonsmoker        Follow up: 6 months

## 2023-06-20 ENCOUNTER — OFFICE VISIT (OUTPATIENT)
Dept: GASTROENTEROLOGY | Facility: CLINIC | Age: 38
End: 2023-06-20
Payer: MEDICAID

## 2023-06-20 VITALS
DIASTOLIC BLOOD PRESSURE: 76 MMHG | TEMPERATURE: 98 F | WEIGHT: 151.63 LBS | BODY MASS INDEX: 27.9 KG/M2 | HEIGHT: 62 IN | RESPIRATION RATE: 18 BRPM | HEART RATE: 68 BPM | SYSTOLIC BLOOD PRESSURE: 120 MMHG

## 2023-06-20 DIAGNOSIS — Z23 IMMUNIZATION DUE: ICD-10-CM

## 2023-06-20 DIAGNOSIS — K50.10 CROHN'S DISEASE OF COLON WITHOUT COMPLICATION: Primary | ICD-10-CM

## 2023-06-20 DIAGNOSIS — K64.4 EXTERNAL HEMORRHOIDS: ICD-10-CM

## 2023-06-20 PROCEDURE — 99214 OFFICE O/P EST MOD 30 MIN: CPT | Mod: PBBFAC | Performed by: NURSE PRACTITIONER

## 2023-06-20 PROCEDURE — 3074F PR MOST RECENT SYSTOLIC BLOOD PRESSURE < 130 MM HG: ICD-10-PCS | Mod: CPTII,S$PBB,, | Performed by: NURSE PRACTITIONER

## 2023-06-20 PROCEDURE — 3008F BODY MASS INDEX DOCD: CPT | Mod: CPTII,S$PBB,, | Performed by: NURSE PRACTITIONER

## 2023-06-20 PROCEDURE — 1160F RVW MEDS BY RX/DR IN RCRD: CPT | Mod: CPTII,S$PBB,, | Performed by: NURSE PRACTITIONER

## 2023-06-20 PROCEDURE — 1160F PR REVIEW ALL MEDS BY PRESCRIBER/CLIN PHARMACIST DOCUMENTED: ICD-10-PCS | Mod: CPTII,S$PBB,, | Performed by: NURSE PRACTITIONER

## 2023-06-20 PROCEDURE — 1159F MED LIST DOCD IN RCRD: CPT | Mod: CPTII,S$PBB,, | Performed by: NURSE PRACTITIONER

## 2023-06-20 PROCEDURE — 3078F PR MOST RECENT DIASTOLIC BLOOD PRESSURE < 80 MM HG: ICD-10-PCS | Mod: CPTII,S$PBB,, | Performed by: NURSE PRACTITIONER

## 2023-06-20 PROCEDURE — 99213 PR OFFICE/OUTPT VISIT, EST, LEVL III, 20-29 MIN: ICD-10-PCS | Mod: S$PBB,,, | Performed by: NURSE PRACTITIONER

## 2023-06-20 PROCEDURE — 3008F PR BODY MASS INDEX (BMI) DOCUMENTED: ICD-10-PCS | Mod: CPTII,S$PBB,, | Performed by: NURSE PRACTITIONER

## 2023-06-20 PROCEDURE — 3074F SYST BP LT 130 MM HG: CPT | Mod: CPTII,S$PBB,, | Performed by: NURSE PRACTITIONER

## 2023-06-20 PROCEDURE — 1159F PR MEDICATION LIST DOCUMENTED IN MEDICAL RECORD: ICD-10-PCS | Mod: CPTII,S$PBB,, | Performed by: NURSE PRACTITIONER

## 2023-06-20 PROCEDURE — 99213 OFFICE O/P EST LOW 20 MIN: CPT | Mod: S$PBB,,, | Performed by: NURSE PRACTITIONER

## 2023-06-20 PROCEDURE — 3078F DIAST BP <80 MM HG: CPT | Mod: CPTII,S$PBB,, | Performed by: NURSE PRACTITIONER

## 2023-06-20 RX ORDER — NORGESTREL AND ETHINYL ESTRADIOL 0.3-0.03MG
1 KIT ORAL DAILY
COMMUNITY
Start: 2023-05-26

## 2023-06-21 PROBLEM — K64.4 EXTERNAL HEMORRHOIDS: Status: ACTIVE | Noted: 2023-06-21

## 2023-06-21 NOTE — ASSESSMENT & PLAN NOTE
Recommend daily soluble fiber supplement.   Avoid straining to have bowel movements.   Sitz baths as needed.   May use tucks pads or preparation H as needed.
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
## 2023-07-20 ENCOUNTER — TELEPHONE (OUTPATIENT)
Dept: GASTROENTEROLOGY | Facility: CLINIC | Age: 38
End: 2023-07-20
Payer: MEDICAID

## 2023-07-20 NOTE — TELEPHONE ENCOUNTER
"Spoke with pt. C/o diarrhea 5-10x/d/ Abd pain since Monday. Denies blood or fever. States, " You remember, this happened before my last infusion." Advised pt that she is having labs done prior to infusion on 7/27/27, this includes a trough level. Pt plans to collect stool for FCP with in the next few days. She does not want to wait until 7/27/23. She has been taking Pepto Bismol BID. Advised pt to try Imodium to help control diarrhea while waiting for infusion & labs. Verbalized understanding. She will call back with any worsening symptoms.   "

## 2023-07-20 NOTE — TELEPHONE ENCOUNTER
----- Message from Brii Brito sent at 7/20/2023 11:43 AM CDT -----  Regarding: Pt having issues  Pt called and stated she has been having diarrhea x 4 days. Pt stated everything she eats comes straight through. Please call pt @ [phone]            Thank You  Marni MORRELL

## 2023-07-24 ENCOUNTER — LAB VISIT (OUTPATIENT)
Dept: LAB | Facility: HOSPITAL | Age: 38
End: 2023-07-24
Attending: NURSE PRACTITIONER
Payer: MEDICAID

## 2023-07-24 DIAGNOSIS — K50.10 CROHN'S DISEASE OF COLON WITHOUT COMPLICATION: ICD-10-CM

## 2023-07-24 PROCEDURE — 83993 ASSAY FOR CALPROTECTIN FECAL: CPT

## 2023-07-27 ENCOUNTER — CLINICAL SUPPORT (OUTPATIENT)
Dept: HEMATOLOGY/ONCOLOGY | Facility: CLINIC | Age: 38
End: 2023-07-27
Payer: MEDICAID

## 2023-07-27 ENCOUNTER — INFUSION (OUTPATIENT)
Dept: INFUSION THERAPY | Facility: HOSPITAL | Age: 38
End: 2023-07-27
Attending: INTERNAL MEDICINE
Payer: MEDICAID

## 2023-07-27 ENCOUNTER — CLINICAL SUPPORT (OUTPATIENT)
Dept: GASTROENTEROLOGY | Facility: CLINIC | Age: 38
End: 2023-07-27
Payer: MEDICAID

## 2023-07-27 VITALS
RESPIRATION RATE: 20 BRPM | TEMPERATURE: 98 F | WEIGHT: 149.94 LBS | HEART RATE: 71 BPM | OXYGEN SATURATION: 94 % | HEIGHT: 62 IN | DIASTOLIC BLOOD PRESSURE: 70 MMHG | BODY MASS INDEX: 27.59 KG/M2 | SYSTOLIC BLOOD PRESSURE: 100 MMHG

## 2023-07-27 DIAGNOSIS — K50.10 CROHN'S DISEASE OF COLON WITHOUT COMPLICATION: Primary | ICD-10-CM

## 2023-07-27 DIAGNOSIS — Z23 NEED FOR VACCINATION: ICD-10-CM

## 2023-07-27 DIAGNOSIS — K50.10 CROHN'S DISEASE OF COLON WITHOUT COMPLICATION: ICD-10-CM

## 2023-07-27 DIAGNOSIS — R19.7 DIARRHEA, UNSPECIFIED TYPE: Primary | ICD-10-CM

## 2023-07-27 LAB
ALBUMIN SERPL-MCNC: 3.4 G/DL (ref 3.5–5)
ALBUMIN/GLOB SERPL: 0.7 RATIO (ref 1.1–2)
ALP SERPL-CCNC: 83 UNIT/L (ref 40–150)
ALT SERPL-CCNC: 22 UNIT/L (ref 0–55)
AST SERPL-CCNC: 15 UNIT/L (ref 5–34)
BASOPHILS # BLD AUTO: 0.07 X10(3)/MCL
BASOPHILS NFR BLD AUTO: 1 %
BILIRUBIN DIRECT+TOT PNL SERPL-MCNC: 0.4 MG/DL
BUN SERPL-MCNC: 7.6 MG/DL (ref 7–18.7)
CALCIUM SERPL-MCNC: 9.1 MG/DL (ref 8.4–10.2)
CALPROTECTIN STL-MCNT: 2894 MCG/G
CHLORIDE SERPL-SCNC: 105 MMOL/L (ref 98–107)
CO2 SERPL-SCNC: 24 MMOL/L (ref 22–29)
CREAT SERPL-MCNC: 0.81 MG/DL (ref 0.55–1.02)
CRP SERPL-MCNC: 61.1 MG/L
DEPRECATED CALCIDIOL+CALCIFEROL SERPL-MC: 16.5 NG/ML (ref 30–80)
EOSINOPHIL # BLD AUTO: 0.73 X10(3)/MCL (ref 0–0.9)
EOSINOPHIL NFR BLD AUTO: 10 %
ERYTHROCYTE [DISTWIDTH] IN BLOOD BY AUTOMATED COUNT: 12.8 % (ref 11.5–17)
FERRITIN SERPL-MCNC: 115.08 NG/ML (ref 4.63–204)
FOLATE SERPL-MCNC: 14.2 NG/ML (ref 7–31.4)
GFR SERPLBLD CREATININE-BSD FMLA CKD-EPI: >60 MLS/MIN/1.73/M2
GLOBULIN SER-MCNC: 4.9 GM/DL (ref 2.4–3.5)
GLUCOSE SERPL-MCNC: 77 MG/DL (ref 74–100)
HCT VFR BLD AUTO: 38.9 % (ref 37–47)
HGB BLD-MCNC: 12.5 G/DL (ref 12–16)
IMM GRANULOCYTES # BLD AUTO: 0.02 X10(3)/MCL (ref 0–0.04)
IMM GRANULOCYTES NFR BLD AUTO: 0.3 %
IRON SATN MFR SERPL: 11 % (ref 20–50)
IRON SERPL-MCNC: 30 UG/DL (ref 50–170)
LYMPHOCYTES # BLD AUTO: 1.53 X10(3)/MCL (ref 0.6–4.6)
LYMPHOCYTES NFR BLD AUTO: 21 %
MCH RBC QN AUTO: 29.1 PG (ref 27–31)
MCHC RBC AUTO-ENTMCNC: 32.1 G/DL (ref 33–36)
MCV RBC AUTO: 90.5 FL (ref 80–94)
MONOCYTES # BLD AUTO: 0.99 X10(3)/MCL (ref 0.1–1.3)
MONOCYTES NFR BLD AUTO: 13.6 %
NEUTROPHILS # BLD AUTO: 3.94 X10(3)/MCL (ref 2.1–9.2)
NEUTROPHILS NFR BLD AUTO: 54.1 %
NRBC BLD AUTO-RTO: 0 %
PLATELET # BLD AUTO: 290 X10(3)/MCL (ref 130–400)
PMV BLD AUTO: 10 FL (ref 7.4–10.4)
POTASSIUM SERPL-SCNC: 3.6 MMOL/L (ref 3.5–5.1)
PROT SERPL-MCNC: 8.3 GM/DL (ref 6.4–8.3)
RBC # BLD AUTO: 4.3 X10(6)/MCL (ref 4.2–5.4)
SODIUM SERPL-SCNC: 137 MMOL/L (ref 136–145)
TIBC SERPL-MCNC: 233 UG/DL (ref 70–310)
TIBC SERPL-MCNC: 263 UG/DL (ref 250–450)
TRANSFERRIN SERPL-MCNC: 228 MG/DL (ref 180–382)
VIT B12 SERPL-MCNC: 675 PG/ML (ref 213–816)
WBC # SPEC AUTO: 7.28 X10(3)/MCL (ref 4.5–11.5)

## 2023-07-27 PROCEDURE — 36415 COLL VENOUS BLD VENIPUNCTURE: CPT

## 2023-07-27 PROCEDURE — 90471 IMMUNIZATION ADMIN: CPT | Mod: PBBFAC

## 2023-07-27 PROCEDURE — 82397 CHEMILUMINESCENT ASSAY: CPT

## 2023-07-27 PROCEDURE — 85025 COMPLETE CBC W/AUTO DIFF WBC: CPT

## 2023-07-27 PROCEDURE — 86140 C-REACTIVE PROTEIN: CPT

## 2023-07-27 PROCEDURE — 99211 OFF/OP EST MAY X REQ PHY/QHP: CPT | Mod: PBBFAC,25,27

## 2023-07-27 PROCEDURE — 82746 ASSAY OF FOLIC ACID SERUM: CPT

## 2023-07-27 PROCEDURE — 96413 CHEMO IV INFUSION 1 HR: CPT

## 2023-07-27 PROCEDURE — 90636 HEP A/HEP B VACC ADULT IM: CPT | Mod: PBBFAC

## 2023-07-27 PROCEDURE — 80230 DRUG ASSAY INFLIXIMAB: CPT

## 2023-07-27 PROCEDURE — 25000003 PHARM REV CODE 250: Performed by: INTERNAL MEDICINE

## 2023-07-27 PROCEDURE — 84630 ASSAY OF ZINC: CPT

## 2023-07-27 PROCEDURE — 80053 COMPREHEN METABOLIC PANEL: CPT

## 2023-07-27 PROCEDURE — 99212 OFFICE O/P EST SF 10 MIN: CPT | Mod: PBBFAC,25

## 2023-07-27 PROCEDURE — 63600175 PHARM REV CODE 636 W HCPCS: Mod: JZ,JG | Performed by: INTERNAL MEDICINE

## 2023-07-27 PROCEDURE — 82728 ASSAY OF FERRITIN: CPT

## 2023-07-27 PROCEDURE — 82607 VITAMIN B-12: CPT

## 2023-07-27 PROCEDURE — 83550 IRON BINDING TEST: CPT

## 2023-07-27 PROCEDURE — 82306 VITAMIN D 25 HYDROXY: CPT

## 2023-07-27 PROCEDURE — 96375 TX/PRO/DX INJ NEW DRUG ADDON: CPT

## 2023-07-27 PROCEDURE — 96415 CHEMO IV INFUSION ADDL HR: CPT

## 2023-07-27 RX ORDER — METHYLPREDNISOLONE SOD SUCC 125 MG
40 VIAL (EA) INJECTION
Status: CANCELLED | OUTPATIENT
Start: 2023-09-21

## 2023-07-27 RX ORDER — DIPHENHYDRAMINE HYDROCHLORIDE 50 MG/ML
25 INJECTION INTRAMUSCULAR; INTRAVENOUS
Status: COMPLETED | OUTPATIENT
Start: 2023-07-27 | End: 2023-07-27

## 2023-07-27 RX ORDER — METHYLPREDNISOLONE SOD SUCC 125 MG
40 VIAL (EA) INJECTION
Status: COMPLETED | OUTPATIENT
Start: 2023-07-27 | End: 2023-07-27

## 2023-07-27 RX ORDER — ACETAMINOPHEN 325 MG/1
650 TABLET ORAL
Status: CANCELLED | OUTPATIENT
Start: 2023-09-21

## 2023-07-27 RX ORDER — METHYLPREDNISOLONE SOD SUCC 125 MG
40 VIAL (EA) INJECTION
Status: CANCELLED
Start: 2023-09-21

## 2023-07-27 RX ORDER — DIPHENHYDRAMINE HYDROCHLORIDE 50 MG/ML
25 INJECTION INTRAMUSCULAR; INTRAVENOUS
Status: CANCELLED | OUTPATIENT
Start: 2023-09-21

## 2023-07-27 RX ORDER — ACETAMINOPHEN 500 MG
500 TABLET ORAL
Status: COMPLETED | OUTPATIENT
Start: 2023-07-27 | End: 2023-07-27

## 2023-07-27 RX ORDER — EPINEPHRINE 1 MG/ML
0.3 INJECTION, SOLUTION, CONCENTRATE INTRAVENOUS
Status: CANCELLED | OUTPATIENT
Start: 2023-09-21

## 2023-07-27 RX ORDER — IPRATROPIUM BROMIDE AND ALBUTEROL SULFATE 2.5; .5 MG/3ML; MG/3ML
3 SOLUTION RESPIRATORY (INHALATION)
Status: CANCELLED | OUTPATIENT
Start: 2023-09-21

## 2023-07-27 RX ORDER — ACETAMINOPHEN 500 MG
500 TABLET ORAL
Status: CANCELLED | OUTPATIENT
Start: 2023-09-21

## 2023-07-27 RX ADMIN — HEPATITIS A AND HEPATITIS B (RECOMBINANT) VACCINE 720 UNITS: 720; 20 INJECTION, SUSPENSION INTRAMUSCULAR at 01:07

## 2023-07-27 RX ADMIN — ACETAMINOPHEN 500 MG: 500 TABLET, FILM COATED ORAL at 09:07

## 2023-07-27 RX ADMIN — SODIUM CHLORIDE 340 MG: 0.9 INJECTION, SOLUTION INTRAVENOUS at 10:07

## 2023-07-27 RX ADMIN — DIPHENHYDRAMINE HYDROCHLORIDE 25 MG: 50 INJECTION INTRAMUSCULAR; INTRAVENOUS at 09:07

## 2023-07-27 RX ADMIN — METHYLPREDNISOLONE SODIUM SUCCINATE 40 MG: 125 INJECTION, POWDER, FOR SOLUTION INTRAMUSCULAR; INTRAVENOUS at 10:07

## 2023-07-27 NOTE — NURSING
08:37 Arrive for Inflectra q 8 weeks c/o of abdominal pain level 9/10 also c/o of episodes of diarrhea for 2 weeks no signs of dehydration skin turgor within normal range. Pt given printed return appointments scheduled for 1/9/24 also 10/19/23.

## 2023-07-27 NOTE — PROGRESS NOTES
Twinrix IM rt deltoid. Tolerated well.  Pt c/o diarrhea (dark stool), foul smell and nausea x 10 d. I spoke with Dr. Blanton. She would like to check C-diff, O & P, Giardia/Crypto and a stool culture. Stool kit given to pt.

## 2023-07-28 ENCOUNTER — LAB VISIT (OUTPATIENT)
Dept: LAB | Facility: HOSPITAL | Age: 38
End: 2023-07-28
Attending: INTERNAL MEDICINE
Payer: MEDICAID

## 2023-07-28 DIAGNOSIS — K50.10 CROHN'S DISEASE OF COLON WITHOUT COMPLICATION: Primary | ICD-10-CM

## 2023-07-28 DIAGNOSIS — R19.7 DIARRHEA, UNSPECIFIED TYPE: ICD-10-CM

## 2023-07-28 DIAGNOSIS — K50.10 CROHN'S DISEASE OF COLON WITHOUT COMPLICATION: ICD-10-CM

## 2023-07-28 DIAGNOSIS — E55.9 VITAMIN D DEFICIENCY: ICD-10-CM

## 2023-07-28 LAB
CLOSTRIDIUM DIFFICILE GDH ANTIGEN (OHS): NEGATIVE
CLOSTRIDIUM DIFFICILE TOXIN A/B (OHS): NEGATIVE
CRYPTOSP AG SPEC QL: NEGATIVE
G LAMBLIA AG STL QL IA: NEGATIVE

## 2023-07-28 PROCEDURE — 87209 SMEAR COMPLEX STAIN: CPT

## 2023-07-28 PROCEDURE — 87045 FECES CULTURE AEROBIC BACT: CPT

## 2023-07-28 PROCEDURE — 86318 IA INFECTIOUS AGENT ANTIBODY: CPT

## 2023-07-28 PROCEDURE — 87329 GIARDIA AG IA: CPT

## 2023-07-28 RX ORDER — ERGOCALCIFEROL 1.25 MG/1
50000 CAPSULE ORAL
Qty: 12 CAPSULE | Refills: 0 | Status: ON HOLD | OUTPATIENT
Start: 2023-07-28 | End: 2024-01-24 | Stop reason: HOSPADM

## 2023-07-28 RX ORDER — PREDNISONE 10 MG/1
TABLET ORAL
Qty: 147 TABLET | Refills: 0 | Status: ON HOLD | OUTPATIENT
Start: 2023-07-28 | End: 2024-01-24 | Stop reason: HOSPADM

## 2023-07-28 NOTE — PROGRESS NOTES
Labs reviewed. Fecal calprotectin and CRP are elevated. Will start Prednisone taper to be sent to her pharmacy to help with symptoms. Will also need to increase the frequency of the infusions but will wait for the trough drug level before deciding on whether infusions should be increased to every 6 weeks vs every 4 weeks. C. Diff, giardia and cryptosporidium are all negative. Her vitamin D level is also low. Will start ergocalciferol once weekly x 12 weeks, prescription sent to her pharmacy.

## 2023-07-30 LAB — BACTERIA STL CULT: ABNORMAL

## 2023-07-31 ENCOUNTER — TELEPHONE (OUTPATIENT)
Dept: GASTROENTEROLOGY | Facility: CLINIC | Age: 38
End: 2023-07-31
Payer: MEDICAID

## 2023-07-31 LAB
BEAKER SEE SCANNED REPORT: NORMAL
CLINICAL BIOCHEMIST REVIEW: ABNORMAL
INFLIXIMAB SERPL-MCNC: 1.9 MCG/ML

## 2023-07-31 NOTE — TELEPHONE ENCOUNTER
----- Message from Mary Blanton MD sent at 7/28/2023  3:01 PM CDT -----  Reviewed.  Discussed case with Kelsy Wren NP.  Will plan to initiate prednisone taper and change infusion frequency based last drug level results.

## 2023-08-01 ENCOUNTER — TELEPHONE (OUTPATIENT)
Dept: GASTROENTEROLOGY | Facility: CLINIC | Age: 38
End: 2023-08-01
Payer: MEDICAID

## 2023-08-01 DIAGNOSIS — A04.5 CAMPYLOBACTER GASTROINTESTINAL TRACT INFECTION: Primary | ICD-10-CM

## 2023-08-01 RX ORDER — AZITHROMYCIN 500 MG/1
500 TABLET, FILM COATED ORAL DAILY
Qty: 7 TABLET | Refills: 0 | Status: SHIPPED | OUTPATIENT
Start: 2023-08-01 | End: 2024-01-10

## 2023-08-01 NOTE — TELEPHONE ENCOUNTER
----- Message from ROSIO Mulligan sent at 8/1/2023  3:01 PM CDT -----  Please notify patient of the stool culture showing campylobacter. Will start azithromycin 500 mg by mouth once daily x 7 days. Please encourage her to stay well hydrated. A Campylobacter infection is an infection that causes sudden diarrhea, stomach cramping, and pain in the area around the belly button. It is caused by a type of bacteria called Campylobacter.Campylobacter bacteria are often found in chicken, turkey, and other types of poultry. These bacteria are also found in other types of meat. Most often, people get infected with campylobacter when they eat undercooked chicken or other meats, eat foods that were prepared using knives, cutting boards, or other kitchen tools that were used on raw chicken or meat and were not washed or drink milk that is not pasteurized, or eat foods made with milk that is not pasteurized. Treatment is not usually needed. Most people get better without treatment, however, people who are very sick, who have a weakened immune system, or who do not get better after a week can get antibiotics to fight the infection.

## 2023-08-01 NOTE — PROGRESS NOTES
Please notify patient of the stool culture showing campylobacter. Will start azithromycin 500 mg by mouth once daily x 7 days. Please encourage her to stay well hydrated. A Campylobacter infection is an infection that causes sudden diarrhea, stomach cramping, and pain in the area around the belly button. It is caused by a type of bacteria called Campylobacter.Campylobacter bacteria are often found in chicken, turkey, and other types of poultry. These bacteria are also found in other types of meat. Most often, people get infected with campylobacter when they eat undercooked chicken or other meats, eat foods that were prepared using knives, cutting boards, or other kitchen tools that were used on raw chicken or meat and were not washed or drink milk that is not pasteurized, or eat foods made with milk that is not pasteurized. Treatment is not usually needed. Most people get better without treatment, however, people who are very sick, who have a weakened immune system, or who do not get better after a week can get antibiotics to fight the infection.

## 2023-08-01 NOTE — TELEPHONE ENCOUNTER
----- Message from ROSIO Mulligan sent at 8/1/2023  9:49 AM CDT -----  Please notify patient that the trough level is low. Will need to increase Inflectra infusions to every 4 weeks. Keep scheduled clinic visit as scheduled on 1/9/24. Zinc level normal.

## 2023-08-02 LAB
CLINICAL BIOCHEMIST REVIEW: NORMAL
INFLIXIMAB AB SERPL-MCNC: <20 U/ML

## 2023-08-24 ENCOUNTER — INFUSION (OUTPATIENT)
Dept: INFUSION THERAPY | Facility: HOSPITAL | Age: 38
End: 2023-08-24
Attending: INTERNAL MEDICINE
Payer: MEDICAID

## 2023-08-24 VITALS
TEMPERATURE: 98 F | RESPIRATION RATE: 18 BRPM | BODY MASS INDEX: 28.07 KG/M2 | OXYGEN SATURATION: 99 % | HEART RATE: 74 BPM | SYSTOLIC BLOOD PRESSURE: 106 MMHG | DIASTOLIC BLOOD PRESSURE: 90 MMHG | WEIGHT: 152.56 LBS | HEIGHT: 62 IN

## 2023-08-24 DIAGNOSIS — K50.10 CROHN'S DISEASE OF COLON WITHOUT COMPLICATION: Primary | ICD-10-CM

## 2023-08-24 PROCEDURE — 96413 CHEMO IV INFUSION 1 HR: CPT

## 2023-08-24 PROCEDURE — 96375 TX/PRO/DX INJ NEW DRUG ADDON: CPT

## 2023-08-24 PROCEDURE — 63600175 PHARM REV CODE 636 W HCPCS: Performed by: NURSE PRACTITIONER

## 2023-08-24 PROCEDURE — 25000003 PHARM REV CODE 250: Performed by: NURSE PRACTITIONER

## 2023-08-24 PROCEDURE — 96415 CHEMO IV INFUSION ADDL HR: CPT

## 2023-08-24 RX ORDER — IPRATROPIUM BROMIDE AND ALBUTEROL SULFATE 2.5; .5 MG/3ML; MG/3ML
3 SOLUTION RESPIRATORY (INHALATION)
Status: CANCELLED | OUTPATIENT
Start: 2023-09-21

## 2023-08-24 RX ORDER — DIPHENHYDRAMINE HYDROCHLORIDE 50 MG/ML
25 INJECTION INTRAMUSCULAR; INTRAVENOUS
Status: CANCELLED | OUTPATIENT
Start: 2023-09-21

## 2023-08-24 RX ORDER — DIPHENHYDRAMINE HYDROCHLORIDE 50 MG/ML
25 INJECTION INTRAMUSCULAR; INTRAVENOUS
Status: COMPLETED | OUTPATIENT
Start: 2023-08-24 | End: 2023-08-24

## 2023-08-24 RX ORDER — ACETAMINOPHEN 500 MG
500 TABLET ORAL
Status: COMPLETED | OUTPATIENT
Start: 2023-08-24 | End: 2023-08-24

## 2023-08-24 RX ORDER — ACETAMINOPHEN 500 MG
500 TABLET ORAL
Status: CANCELLED | OUTPATIENT
Start: 2023-09-21

## 2023-08-24 RX ORDER — METHYLPREDNISOLONE SOD SUCC 125 MG
40 VIAL (EA) INJECTION
Status: CANCELLED | OUTPATIENT
Start: 2023-09-21

## 2023-08-24 RX ORDER — METHYLPREDNISOLONE SOD SUCC 125 MG
40 VIAL (EA) INJECTION
Status: CANCELLED
Start: 2023-09-21

## 2023-08-24 RX ORDER — EPINEPHRINE 1 MG/ML
0.3 INJECTION, SOLUTION, CONCENTRATE INTRAVENOUS
Status: CANCELLED | OUTPATIENT
Start: 2023-09-21

## 2023-08-24 RX ORDER — METHYLPREDNISOLONE SOD SUCC 125 MG
40 VIAL (EA) INJECTION
Status: DISCONTINUED | OUTPATIENT
Start: 2023-08-24 | End: 2023-08-24 | Stop reason: HOSPADM

## 2023-08-24 RX ORDER — ACETAMINOPHEN 325 MG/1
650 TABLET ORAL
Status: CANCELLED | OUTPATIENT
Start: 2023-09-21

## 2023-08-24 RX ADMIN — SODIUM CHLORIDE 350 MG: 0.9 INJECTION, SOLUTION INTRAVENOUS at 11:08

## 2023-08-24 RX ADMIN — SODIUM CHLORIDE: 9 INJECTION, SOLUTION INTRAVENOUS at 10:08

## 2023-08-24 RX ADMIN — DIPHENHYDRAMINE HYDROCHLORIDE 25 MG: 50 INJECTION INTRAMUSCULAR; INTRAVENOUS at 10:08

## 2023-08-24 RX ADMIN — ACETAMINOPHEN 500 MG: 500 TABLET, FILM COATED ORAL at 10:08

## 2023-08-24 RX ADMIN — METHYLPREDNISOLONE SODIUM SUCCINATE 40 MG: 40 INJECTION, POWDER, FOR SOLUTION INTRAMUSCULAR; INTRAVENOUS at 10:08

## 2023-08-28 ENCOUNTER — CLINICAL SUPPORT (OUTPATIENT)
Dept: GASTROENTEROLOGY | Facility: CLINIC | Age: 38
End: 2023-08-28
Payer: MEDICAID

## 2023-08-28 DIAGNOSIS — Z23 NEED FOR VACCINATION: Primary | ICD-10-CM

## 2023-08-28 PROCEDURE — 99213 OFFICE O/P EST LOW 20 MIN: CPT | Mod: PBBFAC

## 2023-08-28 PROCEDURE — 90471 IMMUNIZATION ADMIN: CPT | Mod: PBBFAC

## 2023-08-28 PROCEDURE — 90636 HEP A/HEP B VACC ADULT IM: CPT | Mod: PBBFAC

## 2023-08-28 RX ORDER — IPRATROPIUM BROMIDE AND ALBUTEROL SULFATE 2.5; .5 MG/3ML; MG/3ML
SOLUTION RESPIRATORY (INHALATION)
COMMUNITY

## 2023-08-28 RX ORDER — FERROUS SULFATE 325(65) MG
1 TABLET ORAL DAILY
COMMUNITY

## 2023-08-28 RX ORDER — TRIAMCINOLONE ACETONIDE 1 MG/G
OINTMENT TOPICAL
COMMUNITY
Start: 2023-07-03

## 2023-08-28 RX ADMIN — HEPATITIS A AND HEPATITIS B (RECOMBINANT) VACCINE 720 UNITS: 720; 20 INJECTION, SUSPENSION INTRAMUSCULAR at 09:08

## 2023-08-28 NOTE — LETTER
August 28, 2023      [facility]  [address] [address]  Phone: [phone]       Patient: Shivani Samson   YOB: 1985  Date of Visit: 08/28/2023    To Whom It May Concern:    Please excuse Lelo Stock, she escorted her mother Stephanie Samson to appointment at [facility] on 08/28/2023. The patient may return to work/school on 08/28/2023 with no restrictions. If you have any questions or concerns, or if I can be of further assistance, please do not hesitate to contact me.    Sincerely,    Kajal Hernandez LPN

## 2023-09-21 ENCOUNTER — INFUSION (OUTPATIENT)
Dept: INFUSION THERAPY | Facility: HOSPITAL | Age: 38
End: 2023-09-21
Attending: INTERNAL MEDICINE
Payer: MEDICAID

## 2023-09-21 VITALS
DIASTOLIC BLOOD PRESSURE: 65 MMHG | HEIGHT: 62 IN | OXYGEN SATURATION: 97 % | SYSTOLIC BLOOD PRESSURE: 97 MMHG | TEMPERATURE: 98 F | WEIGHT: 157.19 LBS | RESPIRATION RATE: 20 BRPM | HEART RATE: 74 BPM | BODY MASS INDEX: 28.93 KG/M2

## 2023-09-21 DIAGNOSIS — K50.10 CROHN'S DISEASE OF COLON WITHOUT COMPLICATION: Primary | ICD-10-CM

## 2023-09-21 PROCEDURE — 96413 CHEMO IV INFUSION 1 HR: CPT

## 2023-09-21 PROCEDURE — 63600175 PHARM REV CODE 636 W HCPCS: Mod: JZ,JG | Performed by: NURSE PRACTITIONER

## 2023-09-21 PROCEDURE — 96376 TX/PRO/DX INJ SAME DRUG ADON: CPT

## 2023-09-21 PROCEDURE — 96415 CHEMO IV INFUSION ADDL HR: CPT

## 2023-09-21 PROCEDURE — 25000003 PHARM REV CODE 250: Performed by: NURSE PRACTITIONER

## 2023-09-21 RX ORDER — DIPHENHYDRAMINE HYDROCHLORIDE 50 MG/ML
25 INJECTION INTRAMUSCULAR; INTRAVENOUS
Status: COMPLETED | OUTPATIENT
Start: 2023-09-21 | End: 2023-09-21

## 2023-09-21 RX ORDER — ACETAMINOPHEN 325 MG/1
650 TABLET ORAL
Status: CANCELLED | OUTPATIENT
Start: 2023-10-19

## 2023-09-21 RX ORDER — ACETAMINOPHEN 500 MG
500 TABLET ORAL
Status: COMPLETED | OUTPATIENT
Start: 2023-09-21 | End: 2023-09-21

## 2023-09-21 RX ORDER — DIPHENHYDRAMINE HYDROCHLORIDE 50 MG/ML
25 INJECTION INTRAMUSCULAR; INTRAVENOUS
Status: CANCELLED | OUTPATIENT
Start: 2023-10-19

## 2023-09-21 RX ORDER — IPRATROPIUM BROMIDE AND ALBUTEROL SULFATE 2.5; .5 MG/3ML; MG/3ML
3 SOLUTION RESPIRATORY (INHALATION)
Status: CANCELLED | OUTPATIENT
Start: 2023-10-19

## 2023-09-21 RX ORDER — EPINEPHRINE 1 MG/ML
0.3 INJECTION, SOLUTION, CONCENTRATE INTRAVENOUS
Status: CANCELLED | OUTPATIENT
Start: 2023-10-19

## 2023-09-21 RX ORDER — ACETAMINOPHEN 500 MG
500 TABLET ORAL
Status: CANCELLED | OUTPATIENT
Start: 2023-10-19

## 2023-09-21 RX ORDER — METHYLPREDNISOLONE SOD SUCC 125 MG
40 VIAL (EA) INJECTION
Status: CANCELLED | OUTPATIENT
Start: 2023-10-19

## 2023-09-21 RX ORDER — METHYLPREDNISOLONE SOD SUCC 125 MG
40 VIAL (EA) INJECTION
Status: CANCELLED
Start: 2023-10-19

## 2023-09-21 RX ORDER — METHYLPREDNISOLONE SOD SUCC 125 MG
40 VIAL (EA) INJECTION
Status: DISCONTINUED | OUTPATIENT
Start: 2023-09-21 | End: 2023-09-21 | Stop reason: HOSPADM

## 2023-09-21 RX ADMIN — ACETAMINOPHEN 500 MG: 500 TABLET, FILM COATED ORAL at 10:09

## 2023-09-21 RX ADMIN — SODIUM CHLORIDE 350 MG: 0.9 INJECTION, SOLUTION INTRAVENOUS at 11:09

## 2023-09-21 RX ADMIN — METHYLPREDNISOLONE SODIUM SUCCINATE 40 MG: 40 INJECTION, POWDER, FOR SOLUTION INTRAMUSCULAR; INTRAVENOUS at 10:09

## 2023-09-21 RX ADMIN — DIPHENHYDRAMINE HYDROCHLORIDE 25 MG: 50 INJECTION INTRAMUSCULAR; INTRAVENOUS at 10:09

## 2023-09-21 RX ADMIN — SODIUM CHLORIDE: 9 INJECTION, SOLUTION INTRAVENOUS at 10:09

## 2023-10-19 DIAGNOSIS — K50.10 CROHN'S DISEASE OF COLON WITHOUT COMPLICATION: Primary | ICD-10-CM

## 2023-10-20 ENCOUNTER — INFUSION (OUTPATIENT)
Dept: INFUSION THERAPY | Facility: HOSPITAL | Age: 38
End: 2023-10-20
Attending: INTERNAL MEDICINE
Payer: MEDICAID

## 2023-10-20 VITALS
HEIGHT: 62 IN | WEIGHT: 156.31 LBS | OXYGEN SATURATION: 96 % | BODY MASS INDEX: 28.76 KG/M2 | SYSTOLIC BLOOD PRESSURE: 111 MMHG | TEMPERATURE: 98 F | HEART RATE: 72 BPM | DIASTOLIC BLOOD PRESSURE: 79 MMHG | RESPIRATION RATE: 18 BRPM

## 2023-10-20 DIAGNOSIS — K50.10 CROHN'S DISEASE OF COLON WITHOUT COMPLICATION: Primary | ICD-10-CM

## 2023-10-20 PROCEDURE — 96375 TX/PRO/DX INJ NEW DRUG ADDON: CPT

## 2023-10-20 PROCEDURE — 63600175 PHARM REV CODE 636 W HCPCS: Performed by: NURSE PRACTITIONER

## 2023-10-20 PROCEDURE — 25000003 PHARM REV CODE 250: Performed by: NURSE PRACTITIONER

## 2023-10-20 PROCEDURE — 96413 CHEMO IV INFUSION 1 HR: CPT

## 2023-10-20 PROCEDURE — 96415 CHEMO IV INFUSION ADDL HR: CPT

## 2023-10-20 RX ORDER — METHYLPREDNISOLONE SOD SUCC 125 MG
40 VIAL (EA) INJECTION
Status: CANCELLED | OUTPATIENT
Start: 2023-11-17

## 2023-10-20 RX ORDER — ACETAMINOPHEN 500 MG
500 TABLET ORAL
Status: CANCELLED | OUTPATIENT
Start: 2023-11-17

## 2023-10-20 RX ORDER — DIPHENHYDRAMINE HYDROCHLORIDE 50 MG/ML
25 INJECTION INTRAMUSCULAR; INTRAVENOUS
Status: CANCELLED | OUTPATIENT
Start: 2023-11-17

## 2023-10-20 RX ORDER — EPINEPHRINE 1 MG/ML
0.3 INJECTION, SOLUTION, CONCENTRATE INTRAVENOUS
Status: CANCELLED | OUTPATIENT
Start: 2023-11-17

## 2023-10-20 RX ORDER — ACETAMINOPHEN 325 MG/1
650 TABLET ORAL
Status: ACTIVE | OUTPATIENT
Start: 2023-10-20 | End: 2023-10-20

## 2023-10-20 RX ORDER — METHYLPREDNISOLONE SOD SUCC 125 MG
40 VIAL (EA) INJECTION
Status: CANCELLED
Start: 2023-11-17

## 2023-10-20 RX ORDER — METHYLPREDNISOLONE SOD SUCC 125 MG
40 VIAL (EA) INJECTION
Status: ACTIVE | OUTPATIENT
Start: 2023-10-20 | End: 2023-10-20

## 2023-10-20 RX ORDER — DIPHENHYDRAMINE HYDROCHLORIDE 50 MG/ML
25 INJECTION INTRAMUSCULAR; INTRAVENOUS
Status: ACTIVE | OUTPATIENT
Start: 2023-10-20 | End: 2023-10-20

## 2023-10-20 RX ORDER — METHYLPREDNISOLONE SOD SUCC 125 MG
40 VIAL (EA) INJECTION
Status: DISCONTINUED | OUTPATIENT
Start: 2023-10-20 | End: 2023-10-20 | Stop reason: HOSPADM

## 2023-10-20 RX ORDER — DIPHENHYDRAMINE HYDROCHLORIDE 50 MG/ML
25 INJECTION INTRAMUSCULAR; INTRAVENOUS
Status: COMPLETED | OUTPATIENT
Start: 2023-10-20 | End: 2023-10-20

## 2023-10-20 RX ORDER — IPRATROPIUM BROMIDE AND ALBUTEROL SULFATE 2.5; .5 MG/3ML; MG/3ML
3 SOLUTION RESPIRATORY (INHALATION)
Status: CANCELLED | OUTPATIENT
Start: 2023-11-17

## 2023-10-20 RX ORDER — EPINEPHRINE 1 MG/ML
0.3 INJECTION, SOLUTION, CONCENTRATE INTRAVENOUS
Status: ACTIVE | OUTPATIENT
Start: 2023-10-20 | End: 2023-10-20

## 2023-10-20 RX ORDER — ACETAMINOPHEN 325 MG/1
650 TABLET ORAL
Status: CANCELLED | OUTPATIENT
Start: 2023-11-17

## 2023-10-20 RX ORDER — IPRATROPIUM BROMIDE AND ALBUTEROL SULFATE 2.5; .5 MG/3ML; MG/3ML
3 SOLUTION RESPIRATORY (INHALATION)
Status: ACTIVE | OUTPATIENT
Start: 2023-10-20 | End: 2023-10-20

## 2023-10-20 RX ORDER — ACETAMINOPHEN 500 MG
500 TABLET ORAL
Status: COMPLETED | OUTPATIENT
Start: 2023-10-20 | End: 2023-10-20

## 2023-10-20 RX ADMIN — METHYLPREDNISOLONE SODIUM SUCCINATE 40 MG: 40 INJECTION, POWDER, FOR SOLUTION INTRAMUSCULAR; INTRAVENOUS at 10:10

## 2023-10-20 RX ADMIN — DIPHENHYDRAMINE HYDROCHLORIDE 25 MG: 50 INJECTION INTRAMUSCULAR; INTRAVENOUS at 10:10

## 2023-10-20 RX ADMIN — SODIUM CHLORIDE: 9 INJECTION, SOLUTION INTRAVENOUS at 10:10

## 2023-10-20 RX ADMIN — SODIUM CHLORIDE 360 MG: 9 INJECTION, SOLUTION INTRAVENOUS at 10:10

## 2023-10-20 RX ADMIN — ACETAMINOPHEN 500 MG: 500 TABLET ORAL at 10:10

## 2023-11-17 ENCOUNTER — INFUSION (OUTPATIENT)
Dept: INFUSION THERAPY | Facility: HOSPITAL | Age: 38
End: 2023-11-17
Attending: INTERNAL MEDICINE
Payer: MEDICAID

## 2023-11-17 VITALS — WEIGHT: 156.31 LBS | HEIGHT: 62 IN | BODY MASS INDEX: 28.76 KG/M2

## 2023-11-17 DIAGNOSIS — K50.10 CROHN'S DISEASE OF COLON WITHOUT COMPLICATION: Primary | ICD-10-CM

## 2023-11-17 PROCEDURE — 63600175 PHARM REV CODE 636 W HCPCS: Mod: JW,JG | Performed by: NURSE PRACTITIONER

## 2023-11-17 PROCEDURE — 25000003 PHARM REV CODE 250: Performed by: NURSE PRACTITIONER

## 2023-11-17 PROCEDURE — 96366 THER/PROPH/DIAG IV INF ADDON: CPT

## 2023-11-17 PROCEDURE — 96375 TX/PRO/DX INJ NEW DRUG ADDON: CPT

## 2023-11-17 PROCEDURE — 96365 THER/PROPH/DIAG IV INF INIT: CPT

## 2023-11-17 RX ORDER — ACETAMINOPHEN 500 MG
500 TABLET ORAL
Status: CANCELLED | OUTPATIENT
Start: 2023-12-15

## 2023-11-17 RX ORDER — METHYLPREDNISOLONE SOD SUCC 125 MG
40 VIAL (EA) INJECTION
Status: CANCELLED | OUTPATIENT
Start: 2023-12-15

## 2023-11-17 RX ORDER — DIPHENHYDRAMINE HYDROCHLORIDE 50 MG/ML
25 INJECTION INTRAMUSCULAR; INTRAVENOUS
Status: CANCELLED | OUTPATIENT
Start: 2023-12-15

## 2023-11-17 RX ORDER — ACETAMINOPHEN 325 MG/1
650 TABLET ORAL
Status: CANCELLED | OUTPATIENT
Start: 2023-12-15

## 2023-11-17 RX ORDER — DIPHENHYDRAMINE HYDROCHLORIDE 50 MG/ML
25 INJECTION INTRAMUSCULAR; INTRAVENOUS
Status: COMPLETED | OUTPATIENT
Start: 2023-11-17 | End: 2023-11-17

## 2023-11-17 RX ORDER — METHYLPREDNISOLONE SOD SUCC 125 MG
40 VIAL (EA) INJECTION
Status: CANCELLED
Start: 2023-12-15

## 2023-11-17 RX ORDER — EPINEPHRINE 1 MG/ML
0.3 INJECTION, SOLUTION, CONCENTRATE INTRAVENOUS
Status: ACTIVE | OUTPATIENT
Start: 2023-11-17 | End: 2023-11-17

## 2023-11-17 RX ORDER — ACETAMINOPHEN 325 MG/1
650 TABLET ORAL
Status: ACTIVE | OUTPATIENT
Start: 2023-11-17 | End: 2023-11-17

## 2023-11-17 RX ORDER — EPINEPHRINE 1 MG/ML
0.3 INJECTION, SOLUTION, CONCENTRATE INTRAVENOUS
Status: CANCELLED | OUTPATIENT
Start: 2023-12-15

## 2023-11-17 RX ORDER — IPRATROPIUM BROMIDE AND ALBUTEROL SULFATE 2.5; .5 MG/3ML; MG/3ML
3 SOLUTION RESPIRATORY (INHALATION)
Status: CANCELLED | OUTPATIENT
Start: 2023-12-15

## 2023-11-17 RX ORDER — METHYLPREDNISOLONE SOD SUCC 125 MG
40 VIAL (EA) INJECTION
Status: ACTIVE | OUTPATIENT
Start: 2023-11-17 | End: 2023-11-17

## 2023-11-17 RX ORDER — IPRATROPIUM BROMIDE AND ALBUTEROL SULFATE 2.5; .5 MG/3ML; MG/3ML
3 SOLUTION RESPIRATORY (INHALATION)
Status: ACTIVE | OUTPATIENT
Start: 2023-11-17 | End: 2023-11-17

## 2023-11-17 RX ORDER — DIPHENHYDRAMINE HYDROCHLORIDE 50 MG/ML
25 INJECTION INTRAMUSCULAR; INTRAVENOUS
Status: ACTIVE | OUTPATIENT
Start: 2023-11-17 | End: 2023-11-17

## 2023-11-17 RX ORDER — ACETAMINOPHEN 500 MG
500 TABLET ORAL
Status: COMPLETED | OUTPATIENT
Start: 2023-11-17 | End: 2023-11-17

## 2023-11-17 RX ADMIN — SODIUM CHLORIDE: 9 INJECTION, SOLUTION INTRAVENOUS at 10:11

## 2023-11-17 RX ADMIN — METHYLPREDNISOLONE SODIUM SUCCINATE 40 MG: 40 INJECTION, POWDER, FOR SOLUTION INTRAMUSCULAR; INTRAVENOUS at 10:11

## 2023-11-17 RX ADMIN — DIPHENHYDRAMINE HYDROCHLORIDE 25 MG: 50 INJECTION INTRAMUSCULAR; INTRAVENOUS at 10:11

## 2023-11-17 RX ADMIN — ACETAMINOPHEN 500 MG: 500 TABLET ORAL at 10:11

## 2023-11-17 RX ADMIN — SODIUM CHLORIDE 350 MG: 9 INJECTION, SOLUTION INTRAVENOUS at 11:11

## 2023-11-17 NOTE — NURSING
Patient arrived ambulatory to infusion alert and oriented with no complaints at this time.  Pt is here for Inflectra. Pt tolerated tx well.    Teetee Carmen RN

## 2023-12-15 ENCOUNTER — INFUSION (OUTPATIENT)
Dept: INFUSION THERAPY | Facility: HOSPITAL | Age: 38
End: 2023-12-15
Attending: INTERNAL MEDICINE
Payer: MEDICAID

## 2023-12-15 VITALS
WEIGHT: 156.75 LBS | DIASTOLIC BLOOD PRESSURE: 77 MMHG | SYSTOLIC BLOOD PRESSURE: 106 MMHG | HEART RATE: 69 BPM | OXYGEN SATURATION: 97 % | RESPIRATION RATE: 18 BRPM | HEIGHT: 62 IN | TEMPERATURE: 98 F | BODY MASS INDEX: 28.84 KG/M2

## 2023-12-15 DIAGNOSIS — K50.10 CROHN'S DISEASE OF COLON WITHOUT COMPLICATION: Primary | ICD-10-CM

## 2023-12-15 PROCEDURE — 63600175 PHARM REV CODE 636 W HCPCS: Performed by: NURSE PRACTITIONER

## 2023-12-15 PROCEDURE — 96376 TX/PRO/DX INJ SAME DRUG ADON: CPT

## 2023-12-15 PROCEDURE — 25000003 PHARM REV CODE 250: Performed by: NURSE PRACTITIONER

## 2023-12-15 PROCEDURE — 96413 CHEMO IV INFUSION 1 HR: CPT

## 2023-12-15 PROCEDURE — 96415 CHEMO IV INFUSION ADDL HR: CPT

## 2023-12-15 RX ORDER — IPRATROPIUM BROMIDE AND ALBUTEROL SULFATE 2.5; .5 MG/3ML; MG/3ML
3 SOLUTION RESPIRATORY (INHALATION)
Status: CANCELLED | OUTPATIENT
Start: 2024-01-12

## 2023-12-15 RX ORDER — EPINEPHRINE 1 MG/ML
0.3 INJECTION, SOLUTION, CONCENTRATE INTRAVENOUS
Status: CANCELLED | OUTPATIENT
Start: 2024-01-12

## 2023-12-15 RX ORDER — METHYLPREDNISOLONE SOD SUCC 125 MG
40 VIAL (EA) INJECTION
Status: CANCELLED | OUTPATIENT
Start: 2024-01-12

## 2023-12-15 RX ORDER — METHYLPREDNISOLONE SOD SUCC 125 MG
40 VIAL (EA) INJECTION
Status: DISCONTINUED | OUTPATIENT
Start: 2023-12-15 | End: 2023-12-15 | Stop reason: HOSPADM

## 2023-12-15 RX ORDER — ACETAMINOPHEN 500 MG
500 TABLET ORAL
Status: CANCELLED | OUTPATIENT
Start: 2024-01-12

## 2023-12-15 RX ORDER — DIPHENHYDRAMINE HYDROCHLORIDE 50 MG/ML
25 INJECTION INTRAMUSCULAR; INTRAVENOUS
Status: CANCELLED | OUTPATIENT
Start: 2024-01-12

## 2023-12-15 RX ORDER — DIPHENHYDRAMINE HYDROCHLORIDE 50 MG/ML
25 INJECTION INTRAMUSCULAR; INTRAVENOUS
Status: COMPLETED | OUTPATIENT
Start: 2023-12-15 | End: 2023-12-15

## 2023-12-15 RX ORDER — METHYLPREDNISOLONE SOD SUCC 125 MG
40 VIAL (EA) INJECTION
Status: CANCELLED
Start: 2024-01-12

## 2023-12-15 RX ORDER — ACETAMINOPHEN 325 MG/1
650 TABLET ORAL
Status: CANCELLED | OUTPATIENT
Start: 2024-01-12

## 2023-12-15 RX ORDER — ACETAMINOPHEN 500 MG
500 TABLET ORAL
Status: COMPLETED | OUTPATIENT
Start: 2023-12-15 | End: 2023-12-15

## 2023-12-15 RX ADMIN — ACETAMINOPHEN 500 MG: 500 TABLET, FILM COATED ORAL at 09:12

## 2023-12-15 RX ADMIN — METHYLPREDNISOLONE SODIUM SUCCINATE 40 MG: 40 INJECTION, POWDER, FOR SOLUTION INTRAMUSCULAR; INTRAVENOUS at 09:12

## 2023-12-15 RX ADMIN — SODIUM CHLORIDE 360 MG: 9 INJECTION, SOLUTION INTRAVENOUS at 10:12

## 2023-12-15 RX ADMIN — SODIUM CHLORIDE: 9 INJECTION, SOLUTION INTRAVENOUS at 09:12

## 2023-12-15 RX ADMIN — DIPHENHYDRAMINE HYDROCHLORIDE 25 MG: 50 INJECTION INTRAMUSCULAR; INTRAVENOUS at 09:12

## 2024-01-09 ENCOUNTER — OFFICE VISIT (OUTPATIENT)
Dept: GASTROENTEROLOGY | Facility: CLINIC | Age: 39
End: 2024-01-09
Payer: MEDICAID

## 2024-01-09 VITALS
SYSTOLIC BLOOD PRESSURE: 116 MMHG | HEART RATE: 73 BPM | DIASTOLIC BLOOD PRESSURE: 76 MMHG | TEMPERATURE: 98 F | HEIGHT: 62 IN | RESPIRATION RATE: 16 BRPM | BODY MASS INDEX: 28.89 KG/M2 | WEIGHT: 157 LBS

## 2024-01-09 DIAGNOSIS — K50.10 CROHN'S DISEASE OF COLON WITHOUT COMPLICATION: Primary | ICD-10-CM

## 2024-01-09 LAB
ALBUMIN SERPL-MCNC: 3.8 G/DL (ref 3.5–5)
ALBUMIN/GLOB SERPL: 0.8 RATIO (ref 1.1–2)
ALP SERPL-CCNC: 52 UNIT/L (ref 40–150)
ALT SERPL-CCNC: 12 UNIT/L (ref 0–55)
AST SERPL-CCNC: 14 UNIT/L (ref 5–34)
BASOPHILS # BLD AUTO: 0.09 X10(3)/MCL
BASOPHILS NFR BLD AUTO: 1.4 %
BILIRUB SERPL-MCNC: 0.6 MG/DL
BUN SERPL-MCNC: 13 MG/DL (ref 7–18.7)
CALCIUM SERPL-MCNC: 9.3 MG/DL (ref 8.4–10.2)
CHLORIDE SERPL-SCNC: 105 MMOL/L (ref 98–107)
CO2 SERPL-SCNC: 26 MMOL/L (ref 22–29)
CREAT SERPL-MCNC: 0.83 MG/DL (ref 0.55–1.02)
CRP SERPL-MCNC: 1.4 MG/L
DEPRECATED CALCIDIOL+CALCIFEROL SERPL-MC: 11.6 NG/ML (ref 30–80)
EOSINOPHIL # BLD AUTO: 0.59 X10(3)/MCL (ref 0–0.9)
EOSINOPHIL NFR BLD AUTO: 9.2 %
ERYTHROCYTE [DISTWIDTH] IN BLOOD BY AUTOMATED COUNT: 12.4 % (ref 11.5–17)
FERRITIN SERPL-MCNC: 58.67 NG/ML (ref 4.63–204)
GFR SERPLBLD CREATININE-BSD FMLA CKD-EPI: >60 MLS/MIN/1.73/M2
GLOBULIN SER-MCNC: 4.5 GM/DL (ref 2.4–3.5)
GLUCOSE SERPL-MCNC: 84 MG/DL (ref 74–100)
HBV SURFACE AG SERPL QL IA: NONREACTIVE
HCT VFR BLD AUTO: 41.1 % (ref 37–47)
HGB BLD-MCNC: 13.6 G/DL (ref 12–16)
IMM GRANULOCYTES # BLD AUTO: 0.02 X10(3)/MCL (ref 0–0.04)
IMM GRANULOCYTES NFR BLD AUTO: 0.3 %
IRON SATN MFR SERPL: 26 % (ref 20–50)
IRON SERPL-MCNC: 74 UG/DL (ref 50–170)
LYMPHOCYTES # BLD AUTO: 1.98 X10(3)/MCL (ref 0.6–4.6)
LYMPHOCYTES NFR BLD AUTO: 30.9 %
MCH RBC QN AUTO: 30.1 PG (ref 27–31)
MCHC RBC AUTO-ENTMCNC: 33.1 G/DL (ref 33–36)
MCV RBC AUTO: 90.9 FL (ref 80–94)
MONOCYTES # BLD AUTO: 0.4 X10(3)/MCL (ref 0.1–1.3)
MONOCYTES NFR BLD AUTO: 6.2 %
NEUTROPHILS # BLD AUTO: 3.33 X10(3)/MCL (ref 2.1–9.2)
NEUTROPHILS NFR BLD AUTO: 52 %
NRBC BLD AUTO-RTO: 0 %
PLATELET # BLD AUTO: 297 X10(3)/MCL (ref 130–400)
PMV BLD AUTO: 10.6 FL (ref 7.4–10.4)
POTASSIUM SERPL-SCNC: 3.8 MMOL/L (ref 3.5–5.1)
PROT SERPL-MCNC: 8.3 GM/DL (ref 6.4–8.3)
RBC # BLD AUTO: 4.52 X10(6)/MCL (ref 4.2–5.4)
SODIUM SERPL-SCNC: 138 MMOL/L (ref 136–145)
TIBC SERPL-MCNC: 213 UG/DL (ref 70–310)
TIBC SERPL-MCNC: 287 UG/DL (ref 250–450)
TRANSFERRIN SERPL-MCNC: 264 MG/DL (ref 180–382)
WBC # SPEC AUTO: 6.41 X10(3)/MCL (ref 4.5–11.5)

## 2024-01-09 PROCEDURE — 36415 COLL VENOUS BLD VENIPUNCTURE: CPT | Performed by: INTERNAL MEDICINE

## 2024-01-09 PROCEDURE — 99214 OFFICE O/P EST MOD 30 MIN: CPT | Mod: PBBFAC | Performed by: INTERNAL MEDICINE

## 2024-01-09 PROCEDURE — 87340 HEPATITIS B SURFACE AG IA: CPT | Performed by: INTERNAL MEDICINE

## 2024-01-09 PROCEDURE — 86480 TB TEST CELL IMMUN MEASURE: CPT | Performed by: INTERNAL MEDICINE

## 2024-01-09 PROCEDURE — 85025 COMPLETE CBC W/AUTO DIFF WBC: CPT | Performed by: INTERNAL MEDICINE

## 2024-01-09 PROCEDURE — 83540 ASSAY OF IRON: CPT | Performed by: INTERNAL MEDICINE

## 2024-01-09 PROCEDURE — 84630 ASSAY OF ZINC: CPT | Performed by: INTERNAL MEDICINE

## 2024-01-09 PROCEDURE — 86140 C-REACTIVE PROTEIN: CPT | Performed by: INTERNAL MEDICINE

## 2024-01-09 PROCEDURE — 80053 COMPREHEN METABOLIC PANEL: CPT | Performed by: INTERNAL MEDICINE

## 2024-01-09 PROCEDURE — 82728 ASSAY OF FERRITIN: CPT | Performed by: INTERNAL MEDICINE

## 2024-01-09 PROCEDURE — 82306 VITAMIN D 25 HYDROXY: CPT | Performed by: INTERNAL MEDICINE

## 2024-01-09 RX ORDER — GUAIFENESIN 100 MG/5ML
LIQUID ORAL
COMMUNITY
Start: 2023-12-29

## 2024-01-09 RX ORDER — POLYETHYLENE GLYCOL 3350, SODIUM SULFATE, SODIUM CHLORIDE, POTASSIUM CHLORIDE, SODIUM ASCORBATE, AND ASCORBIC ACID 7.5-2.691G
KIT ORAL
Qty: 1 KIT | Refills: 0 | Status: ON HOLD | OUTPATIENT
Start: 2024-01-09 | End: 2024-01-24 | Stop reason: HOSPADM

## 2024-01-09 NOTE — PROGRESS NOTES
Inflammatory Bowel Disease        Established Patient Note         TODAY'S VISIT DATE:  1/9/2024  The patient's last visit with me was on Visit date not found.     PCP: Mary Carmen Ocampo      Referring MD:   No ref. provider found    History of Present Illness:  Shivani is a 38 year old with Crohn's colitis on infliximab 5 mg/kg every 4 weeks monotherapy here for follow-up.    She was diagnosed with Crohn's colitis by Dr. Ann in January 2015 when she began experiencing diarrhea, abdominal pain, and weight loss. When she was diagnosed she was initially started on a steroid taper, balsalazide and when she did not improve, Humira was started. Fecal calprotectin in march 2018: 445. Adalimumab trough in May 2018 was 1.8, Ab<25, so azathioprine was added. She did not tolerate azathioprine per notes. Due to continued symptoms and low trough level, her Humira dose was increased to 40mg weekly in 2018. She was kept on balsalazide as well.     She did have a rectal abscess in the past, referred to surgery by Dr. Ann, and underwent I&D. No notes to review regarding this and findings.    Adalimumab level in October 2019 was 13, Ab<25. Unsure if this was trough level.     September 2020: Adalimumab trough: 5.7, Ab <25  September 2020: fecal calprotectin 936    When seen initially by me in September 2020, she was doing well clinically. She reported compliance with her Humira weekly. She was no longer taking balsalazide because she felt that it was unhelpful. She reported baseline 1-2 bowel movements a day with occasional hard stools with bloody streaks. She did report occasional breakthrough episodes of increased diarrhea and abdominal pain. She had never had a repeat colonoscopy since starting on Humira. I recommended a colonoscopy at that time. Despite several scheduled colonoscopies, she canceled each appointment and did not follow up till April 2022    She was found to be pregnant in  November 2021. She had a miscarriage during this pregnancy.     In April 2022 on follow-up she was having diarrhea, abdominal pain, weight loss.  She had not been on Humira due to prescription lapse for 3 months.  She was taking NSAIDs and was given prednisone 5mg by PCP which was not helpful. She denied heartburn or reflux.     April 2022 Colonoscopy: multiple scattered medium to large ulcerations throughout the colon.      I started her on infliximab (inflectra) and azathioprine in May 2022.  She has completed loading doses of infliximab and was on 5 mg/kg every 8 weeks.  The azathioprine made her have nausea, vomiting, diarrhea so she stopped taking it and symptoms resolved.  She completed a prednisone taper as well.     August 2022: IFX trough 3.1, Ab < 20      August 2022: Fecal calprotectin: 751     In August 2022 on follow-up she was pregnant.  Clinically improved on infusions.  She remained on 5 mg/kg every 8 weeks  Delivered healthy baby boy via vaginal delivery in January 2023.  She was followed by M for the pregnancy.      February 2023 Fecal calprotectin: 155  February IFX trough 8.5, Ab < 20     Diarrhea in July 2023. Campylobacter positive- treated.  Fecal calprotectin 2894.  Given steroid taper and recheck of trough.   July 2023: IFX tough 1.9, Ab < 20  Changed to 5 mg/kg every 4 weeks  October 2023: IFX trough 13, Ab < 20        Today, patient reports that she is feeling well overall.  She has not currently having any complications with infusions and reports compliance.  She does report constipation with BRBPR.  She states that she notices bright red blood on the paper when she wipes and mixed into her stool.  She reports having a bowel movement approximately every 2-3 days that are normal caliber/size.  She denies other associated symptoms such as abdominal pain, diarrhea, urgency, nocturnal stools, loss of appetite, and weight loss.    She does not smoke, no significant alcohol or recreational  drug use. No FH of IBD..     IBD History:  IBD disease: Crohn's disease  Disease location: Colon     Current IBD Therapy:  Inflectra 5mg/kg every 4 weeks (May 2022 - present - changed to 4 weeks in July 2023)     Therapeutic Drug Monitoring Labs:  July 2023: IFX tough 1.9, Ab < 20  Changed to 5 mg/kg every 4 weeks  October 2023: IFX trough 13, Ab < 20     Prior IBD Therapies:  Balsalazide  Humira 40mg weekly (2018- Jan 2022)  Prednisone  Azathioprine        Pertinent Endoscopy/Imaging:  January 7, 2015: Colonoscopy (Dr. Ann): Erythematous mucosa and cratered ulcerations in the ascending and transverse colon. Erythematous mucosa and white plaques in the sigmoid and descending colon. No comment on TI. Pathology: Focal colitis, moderate activity, c/w IBD     April 29, 2022: Colonoscopy: skin tags, normal TI, Med to large patches of deep ulcerations surrounded by normal mucosa in the rectum, sigmoid, descending, transverse, and ascending.  Path: severe active chronic colitis with cryptitis, crypt abscess, dropout        Prior Pertinent Surgeries:   none     Complications:   none     Extraintestinal Manifestations:  None       Review of Systems   Constitutional:  Negative for appetite change and unexpected weight change.   HENT:  Negative for trouble swallowing.    Respiratory:  Negative for chest tightness.    Cardiovascular: Negative.    Gastrointestinal:  Positive for blood in stool and constipation. Negative for abdominal pain, change in bowel habit, diarrhea, nausea, rectal pain, vomiting, reflux and fecal incontinence.   Musculoskeletal: Negative.    Neurological: Negative.    Psychiatric/Behavioral: Negative.     All other systems reviewed and are negative.         Medical/Surgical History:   Past Medical History:   Diagnosis Date    Crohn's colitis     Mild intermittent asthma, uncomplicated      Past Surgical History:   Procedure Laterality Date    COLONOSCOPY      FISTULA REPAIR      TONSILLECTOMY AND  "ADENOIDECTOMY           Family History:   Family History   Problem Relation Age of Onset    Colon cancer Mother     Diabetes Father     Diabetes Maternal Grandmother     Alzheimer's disease Paternal Grandmother         Social History:   Social History     Socioeconomic History    Marital status: Single   Tobacco Use    Smoking status: Never    Smokeless tobacco: Never   Substance and Sexual Activity    Alcohol use: Not Currently    Drug use: Never    Sexual activity: Yes     Partners: Male     Birth control/protection: OCP     Comment: Currently pregnant        Review of patient's allergies indicates:  No Known Allergies    Current Medications:   Outpatient Medications Marked as Taking for the 1/9/24 encounter (Office Visit) with Mary Blanton MD   Medication Sig Dispense Refill    albuterol (PROVENTIL) 2.5 mg /3 mL (0.083 %) nebulizer solution Inhale 2.5 mg into the lungs as needed.      albuterol (PROVENTIL/VENTOLIN HFA) 90 mcg/actuation inhaler Inhale 2 puffs into the lungs every 4 (four) hours as needed.      albuterol-ipratropium (DUO-NEB) 2.5 mg-0.5 mg/3 mL nebulizer solution USE 1 VIAL VIA NEBULIZER EVERY 6 HOURS AS NEEDED FOR WHEEZING      CHEST CONGESTION RELIEF 100 mg/5 mL syrup Take by mouth.      SYMBICORT 160-4.5 mcg/actuation HFAA Inhale 2 puffs into the lungs every 12 (twelve) hours.       Current Facility-Administered Medications for the 1/9/24 encounter (Office Visit) with Mary Blanton MD   Medication Dose Route Frequency Provider Last Rate Last Admin    [COMPLETED] hepatitis A and B vaccine (PF) 720 RUFUS unit- 20 mcg/mL suspension 720 Units  1 mL Intramuscular 1 time in Clinic/HOD Mary Blanton MD   720 Units at 01/09/24 1010        Vital Signs:  /76 (BP Location: Left arm, Patient Position: Sitting, BP Method: Large (Automatic))   Pulse 73   Temp 97.7 °F (36.5 °C) (Oral)   Resp 16   Ht 5' 2" (1.575 m)   Wt 71.2 kg (157 lb)   LMP 12/28/2023 (Exact Date)   BMI " 28.72 kg/m²      Physical Exam  Vitals and nursing note reviewed.   Constitutional:       Appearance: Normal appearance.   HENT:      Head: Normocephalic and atraumatic.      Mouth/Throat:      Mouth: Mucous membranes are moist.   Eyes:      Conjunctiva/sclera: Conjunctivae normal.   Cardiovascular:      Rate and Rhythm: Normal rate and regular rhythm.      Pulses: Normal pulses.      Heart sounds: No murmur heard.     No friction rub. No gallop.   Pulmonary:      Effort: Pulmonary effort is normal.      Breath sounds: Normal breath sounds.   Abdominal:      General: Bowel sounds are normal. There is no distension.      Palpations: Abdomen is soft.      Tenderness: There is no abdominal tenderness. There is no rebound.   Musculoskeletal:         General: Normal range of motion.      Cervical back: Normal range of motion.   Skin:     General: Skin is warm.   Neurological:      General: No focal deficit present.      Mental Status: She is alert. Mental status is at baseline.   Psychiatric:         Mood and Affect: Mood normal.         Behavior: Behavior normal.         Labs: Reviewed  Hgb   Date Value Ref Range Status   07/27/2023 12.5 12.0 - 16.0 g/dL Final     Albumin Level   Date Value Ref Range Status   07/27/2023 3.4 (L) 3.5 - 5.0 g/dL Final     Iron Level   Date Value Ref Range Status   07/27/2023 30 (L) 50 - 170 ug/dL Final     Ferritin Level   Date Value Ref Range Status   07/27/2023 115.08 4.63 - 204.00 ng/mL Final     Folate Level   Date Value Ref Range Status   07/27/2023 14.2 7.0 - 31.4 ng/mL Final     Vitamin B12 Level   Date Value Ref Range Status   07/27/2023 675 213 - 816 pg/mL Final     Vit D 25 OH   Date Value Ref Range Status   07/27/2023 16.5 (L) 30.0 - 80.0 ng/mL Final     Zinc   Date Value Ref Range Status   01/10/2023 49 (L) 60 - 106 mcg/dL Final     Comment:        -------------------ADDITIONAL INFORMATION-------------------  This test was developed and its performance characteristics    determined by [facility] in a manner consistent with CLIA   requirements. This test has not been cleared or approved by   the [place]. Food and Drug Administration.     Test Performed by:  [facility] - [address] [address]  : Neal Bae M.D. Ph.D.; CLIA# [id number]     C-Reactive Protein   Date Value Ref Range Status   07/27/2023 61.10 (H) <5.00 mg/L Final     Calprotectin, F   Date Value Ref Range Status   07/24/2023 2894 (H) <50.0 (Normal) mcg/g Final     Comment:     Interpretation: Abnormal (>120 mcg/g)     Test Performed by:  [facility] - [address] [address]  : Neal Bae M.D. Ph.D.; CLIA# [id number]     Hepatitis B Surface Antigen   Date Value Ref Range Status   01/10/2023 Nonreactive Nonreactive Final      Quantiferon gold: negative February 2023  Fecal calprotectin: March 2018: 445 --> Sept 2020: 936      Assessment/Plan:    Problem List Items Addressed This Visit          GI    Crohn's disease of colon without complication - Primary     Crohn's colitis based on initial colonoscopy in 2015.   Previously on Humira 40mg weekly, clinically seemed to improve but no endoscopic evaluation to assess for mucosal healing  Adalimumab trough 5.7, Ab < 25 when on Humira 40mg weekly back in Sept 2020  Stopped Humira early 2022 April 2022 Colonoscopy: active Crohn's colitis with scattered ulcerations  Inflectra 5mg/kg started May 2022.  Could not tolerate azathioprine  Clinical improvement on infliximab  Week 14 trough 3.1, Ab < 20 (while pregnant)  August 2022: fecal calprotectin 751  January 2023: calprotectin 155, CRP 2.9  Postpartum: Delivered son on 1/18/2023 February 2023 Fecal calprotectin: 155  February IFX trough 8.5, Ab < 20    Diarrhea in July 2023. Campylobacter positive- treated.  Fecal calprotectin 2894.  Given steroid taper and recheck of  trough.   July 2023: IFX tough 1.9, Ab < 20  Changed to 5 mg/kg every 4 weeks  October 2023: IFX trough 13, Ab < 20    Therapeutic dosing now on 5 mg/kg every 4 weeks monotherapy  Clinically improved  Recheck fecal calprotectin and labs  Schedule colonoscopy             Relevant Orders    CBC Auto Differential (Completed)    Comprehensive Metabolic Panel (Completed)    C-Reactive Protein (Completed)    Calprotectin, Stool (Completed)    Iron and TIBC (Completed)    Ferritin (Completed)    Vitamin D (Completed)    Zinc (Completed)    Hepatitis B Surface Antigen (Completed)    Quantiferon Gold TB (Completed)    Case Request Endoscopy: COLONOSCOPY (Completed)     HEALTH MAINTENANCE:     Vaccinations:    Influenza (inactive):  recommended annually   PCV 20 (Prevnar): recommended  Tetanus (TdaP): September 2010, recommended every 10 years  HPV (males and females ages 11-46 yo): N/A  Meningococcal: No risk factors   Hepatitis B: Twinrix #3 today  Hepatitis A:  Twinrix #3 today  MMR (live vaccine): UTD          Chickenpox status/Varicella (live vaccine): immune, +Ab  Shingrix: recommended   COVID-19: recommend    Colorectal cancer risk:  Risk factors: > 8 years of disease, > 1/3 colon involved  - Distribution of colonic disease: > 1/3 of colon  - Year of symptom onset: 2015  - Colonoscopy every 2-3 years after endoscopic remission    Ophthalmologic exam recommended yearly  Dermatologic exam recommended yearly due to risk of skin cancer with IBD/meds    Bone health:  Calcium 0292-6362 mg daily and vitamin D 800 IU daily  Risk factors for osteopenia/osteoporosis: steroid use  Vitamin D: 13.4, recheck  Ergocalciferol 50,000 IU weekly x 12 weeks if low  DEXA scan: recommend    Females:  Gynecological exam yearly.    Smoking status: nonsmoker    Follow up: 6 months    Peterson Dumont DO  [employer] [facility] PGY-1

## 2024-01-09 NOTE — ASSESSMENT & PLAN NOTE
Crohn's colitis based on initial colonoscopy in 2015.   Previously on Humira 40mg weekly, clinically seemed to improve but no endoscopic evaluation to assess for mucosal healing  Adalimumab trough 5.7, Ab < 25 when on Humira 40mg weekly back in Sept 2020  Stopped Humira early 2022 April 2022 Colonoscopy: active Crohn's colitis with scattered ulcerations  Inflectra 5mg/kg started May 2022.  Could not tolerate azathioprine  Clinical improvement on infliximab  Week 14 trough 3.1, Ab < 20 (while pregnant)  August 2022: fecal calprotectin 751  January 2023: calprotectin 155, CRP 2.9  Postpartum: Delivered son on 1/18/2023 February 2023 Fecal calprotectin: 155  February IFX trough 8.5, Ab < 20    Diarrhea in July 2023. Campylobacter positive- treated.  Fecal calprotectin 2894.  Given steroid taper and recheck of trough.   July 2023: IFX tough 1.9, Ab < 20  Changed to 5 mg/kg every 4 weeks  October 2023: IFX trough 13, Ab < 20    Therapeutic dosing now on 5 mg/kg every 4 weeks monotherapy  Clinically improved  Recheck fecal calprotectin and labs  Schedule colonoscopy

## 2024-01-09 NOTE — PROGRESS NOTES
Inflammatory Bowel Disease        Established Patient Note         TODAY'S VISIT DATE:  1/9/2024  The patient's last visit with me was on Visit date not found.     PCP: Mary Carmen Ocampo      Referring MD:   No ref. provider found    History of Present Illness:  Shivani is a 38 year old with Crohn's colitis here for follow-up.     She was diagnosed with Crohn's colitis by Dr. Ann in January 2015 when she began experiencing diarrhea, abdominal pain, and weight loss. When she was diagnosed she was initially started on a steroid taper, balsalazide and when she did not improve, Humira was started. Fecal calprotectin in march 2018: 445. Adalimumab trough in May 2018 was 1.8, Ab<25, so azathioprine was added. She did not tolerate azathioprine per notes. Due to continued symptoms and low trough level, her Humira dose was increased to 40mg weekly in 2018. She was kept on balsalazide as well.     She did have a rectal abscess in the past, referred to surgery by Dr. Ann, and underwent I&D. No notes to review regarding this and findings.    Adalimumab level in October 2019 was 13, Ab<25. Unsure if this was trough level.     September 2020: Adalimumab trough: 5.7, Ab <25  September 2020: fecal calprotectin 936    When seen initially by me in September 2020, she was doing well clinically. She reported compliance with her Humira weekly. She was no longer taking balsalazide because she felt that it was unhelpful. She reported baseline 1-2 bowel movements a day with occasional hard stools with bloody streaks. She did report occasional breakthrough episodes of increased diarrhea and abdominal pain. She had never had a repeat colonoscopy since starting on Humira. I recommended a colonoscopy at that time to evaluate for response to her current therapy and mucosal healing. Despite several scheduled colonoscopies, she canceled each appointment and has not followed up till April 2022    She was  found to be pregnant in November 2021. She had a miscarriage during this pregnancy.     In April 2022 on follow-up she was having diarrhea, abdominal pain, weight loss.  She had not been on Humira due to prescription lapse for 3 months.  She was taking NSAIDs and was given prednisone 5mg by PCP which was not helpful. She denies heartburn or reflux.     April 2022 Colonoscopy: multiple scattered medium to large ulcerations throughout the colon.      I started her on infliximab (inflectra) and azathioprine in May 2022.  She has completed loading doses of infliximab.  The azathioprine made her have nausea, vomiting, diarrhea so she stopped taking it and symptoms resolved.  She completed a prednisone taper as well.      August 2022: Fecal calprotectin: 751     She currently is 40 WGA due date, January 15.  She is seeing MFM, Dr. Wendie Toure.  She has remained compliant with her infusions every 8 weeks.  Trough 3.1, Ab < 20 in August 2022.   She is now having 1 formed stool every other day.   She denies any rectal bleeding or abdominal pain.  Appetite is good and weight is stable.       She is having heartburn since she became pregnant.  She is taking otc prilosec or prevacid (she's not sure) which is helpful.     She does not smoke, no significant alcohol or recreational drug use. No FH of IBD.    Today, she reports doing well overall. She delivered a healthy son via a  vaginal delivery on 1/18/23. Before her last Inflectra infusion on 6/1/23, she had diarrhea which lasted approximately 5-6 days and irritated her rectum from having multiple stools per day. Typically, she has a formed bowel movement every 2-3 days with no blood or mucus. Occasionally, she will have a hard stool which will cause BRB when she wipes and sometimes on the stool. After eating spicy food 2 days ago, she had diarrhea. Stools were soft and getting back to normal yesterday. No stools so far today. She reports irritation to her rectum  after having multiple bowel movements. She looked at her rectum with a mirror but she wasn't sure what she was looking at.  Denies history of hemorrhoids. Denies abdominal pain. She will have occasional stomach cramps when needing to have a bowel movement or after eating something spicy. Denies feelings of incomplete emptying. Good appetite. Weights stable after delivery of son. She is eating and drinking well.       IBD History:  IBD disease: Crohn's disease  Disease location: Colon     Current IBD Therapy:  Inflectra 5mg/kg every 8 weeks  (May 2022 - present)     Therapeutic Drug Monitoring Labs:  August 2022: IFX trough: 3.1, Ab < 20 (while pregnant)     Prior IBD Therapies:  Balsalazide  Humira 40mg weekly (2018- Jan 2022)  Prednisone  Azathioprine        Pertinent Endoscopy/Imaging:  January 7, 2015: Colonoscopy (Dr. Ann): Erythematous mucosa and cratered ulcerations in the ascending and transverse colon. Erythematous mucosa and white plaques in the sigmoid and descending colon. No comment on TI. Pathology: Focal colitis, moderate activity, c/w IBD     April 29, 2022: Colonoscopy: skin tags, normal TI, Med to large patches of deep ulcerations surrounded by normal mucosa in the rectum, sigmoid, descending, transverse, and ascending.  Path: severe active chronic colitis with cryptitis, crypt abscess, dropout        Prior Pertinent Surgeries:   none     Complications:   none     Extraintestinal Manifestations:  None  Review of Systems   Constitutional:  Negative for appetite change and unexpected weight change.   HENT:  Negative for trouble swallowing.    Respiratory:  Negative for chest tightness.    Cardiovascular: Negative.    Gastrointestinal:  Positive for diarrhea and rectal pain (irritation). Negative for abdominal pain, blood in stool, change in bowel habit and reflux.   Musculoskeletal: Negative.    Neurological: Negative.    Psychiatric/Behavioral: Negative.     All other systems reviewed and are  negative.         Medical/Surgical History:   Past Medical History:   Diagnosis Date    Crohn's colitis     Mild intermittent asthma, uncomplicated      Past Surgical History:   Procedure Laterality Date    COLONOSCOPY      FISTULA REPAIR      TONSILLECTOMY AND ADENOIDECTOMY           Family History:   Family History   Problem Relation Age of Onset    Colon cancer Mother     Diabetes Father     Diabetes Maternal Grandmother     Alzheimer's disease Paternal Grandmother         Social History:   Social History     Socioeconomic History    Marital status: Single   Tobacco Use    Smoking status: Never    Smokeless tobacco: Never   Substance and Sexual Activity    Alcohol use: Not Currently    Drug use: Never    Sexual activity: Yes     Partners: Male     Birth control/protection: OCP     Comment: Currently pregnant        Review of patient's allergies indicates:  No Known Allergies    Current Medications:   Outpatient Medications Marked as Taking for the 1/9/24 encounter (Office Visit) with Mary Blanton MD   Medication Sig Dispense Refill    albuterol (PROVENTIL) 2.5 mg /3 mL (0.083 %) nebulizer solution Inhale 2.5 mg into the lungs as needed.      albuterol (PROVENTIL/VENTOLIN HFA) 90 mcg/actuation inhaler Inhale 2 puffs into the lungs every 4 (four) hours as needed.      albuterol-ipratropium (DUO-NEB) 2.5 mg-0.5 mg/3 mL nebulizer solution USE 1 VIAL VIA NEBULIZER EVERY 6 HOURS AS NEEDED FOR WHEEZING      CHEST CONGESTION RELIEF 100 mg/5 mL syrup Take by mouth.      SYMBICORT 160-4.5 mcg/actuation HFAA Inhale 2 puffs into the lungs every 12 (twelve) hours.          Vital Signs:  There were no vitals taken for this visit.     Physical Exam  Vitals reviewed. Exam conducted with a chaperone present (Mily Mariscal RN).   Constitutional:       Appearance: Normal appearance.   HENT:      Head: Normocephalic and atraumatic.      Mouth/Throat:      Mouth: Mucous membranes are moist.   Eyes:      General: No scleral  icterus.     Conjunctiva/sclera: Conjunctivae normal.   Cardiovascular:      Rate and Rhythm: Normal rate and regular rhythm.   Pulmonary:      Effort: Pulmonary effort is normal.      Breath sounds: Normal breath sounds.   Abdominal:      General: Bowel sounds are normal.      Palpations: Abdomen is soft.      Tenderness: There is no abdominal tenderness.   Genitourinary:     Rectum: External hemorrhoid present. No mass, tenderness or anal fissure.   Musculoskeletal:         General: Normal range of motion.      Cervical back: Normal range of motion.   Skin:     General: Skin is warm.   Neurological:      General: No focal deficit present.      Mental Status: She is alert and oriented to person, place, and time. Mental status is at baseline.   Psychiatric:         Mood and Affect: Mood normal.         Behavior: Behavior normal.         Labs: Reviewed  Hgb   Date Value Ref Range Status   07/27/2023 12.5 12.0 - 16.0 g/dL Final     Albumin Level   Date Value Ref Range Status   07/27/2023 3.4 (L) 3.5 - 5.0 g/dL Final     Iron Level   Date Value Ref Range Status   07/27/2023 30 (L) 50 - 170 ug/dL Final     Ferritin Level   Date Value Ref Range Status   07/27/2023 115.08 4.63 - 204.00 ng/mL Final     Folate Level   Date Value Ref Range Status   07/27/2023 14.2 7.0 - 31.4 ng/mL Final     Vitamin B12 Level   Date Value Ref Range Status   07/27/2023 675 213 - 816 pg/mL Final     Vit D 25 OH   Date Value Ref Range Status   07/27/2023 16.5 (L) 30.0 - 80.0 ng/mL Final     Zinc   Date Value Ref Range Status   01/10/2023 49 (L) 60 - 106 mcg/dL Final     Comment:        -------------------ADDITIONAL INFORMATION-------------------  This test was developed and its performance characteristics   determined by [facility] in a manner consistent with CLIA   requirements. This test has not been cleared or approved by   the [place] Food and Drug Administration.     Test Performed by:  [facility]  [facility]  [address] [place] [address]  : Neal Bae M.D. Ph.D.; CLIA# [id number]     C-Reactive Protein   Date Value Ref Range Status   07/27/2023 61.10 (H) <5.00 mg/L Final     Calprotectin, F   Date Value Ref Range Status   07/24/2023 2894 (H) <50.0 (Normal) mcg/g Final     Comment:     Interpretation: Abnormal (>120 mcg/g)     Test Performed by:  [facility] [id number] [place] [address]  : Neal Bae M.D. Ph.D.; CLIA# [id number]     Hepatitis B Surface Antigen   Date Value Ref Range Status   01/10/2023 Nonreactive Nonreactive Final           Assessment/Plan:  Crohn's disease of colon without complication - Primary  Crohn's colitis based on initial colonoscopy in 2015.   Previously on Humira 40mg weekly, clinically seemed to improve but no endoscopic evaluation to assess for mucosal healing  Adalimumab trough 5.7, Ab < 25 when on Humira 40mg weekly back in Sept 2020  Stopped Humira early 2022 April 2022 Colonoscopy: active Crohn's colitis with scattered ulcerations  Inflectra 5mg/kg started May 2022.  Could not tolerate azathioprine  Clinical improvement on infliximab  Week 14 trough 3.1, Ab < 20 (while pregnant)  August 2022: fecal calprotectin 751  January 2023: calprotectin 155, CRP 2.9  Postpartum: Delivered son on 1/18/2023 February 2023: trough level 8.5     Recheck fecal calprotectin  Labs with next infusion appointment on 7/27/2023. Will recheck trough but suspect will need to increase infusions for goal trough > 10        HEALTH MAINTENANCE:     Vaccinations:     Influenza (inactive):  recommended annually   PCV 13 (Prevnar): recommended  PPSV 23 (Pneumovax): 2-12 mos after PCV 13, repeat 5 years later and then after age 66 yo  Tetanus (TdaP): September 2010, recommended every 10 years  HPV (males and females ages 11-44 yo): N/A  Meningococcal: UTD, No risk factors   Hepatitis B: Completed HBV  series.  not immune, will need to repeat series  Hepatitis A:  not immune   MMR (live vaccine): UTD    Chickenpox status/Varicella (live vaccine): immune, +Ab  Shingrix: recommended   COVID-19: recommend       Colorectal cancer risk:  Risk factors: > 8 years of disease, > 1/3 colon involved  - Distribution of colonic disease: > 1/3 of colon  - Year of symptom onset: 2015  - Colonoscopy every 1-3 years after endoscopic remission    Ophthalmologic exam recommended yearly  Dermatologic exam recommended yearly due to risk of skin cancer with IBD/meds    Bone health:  Calcium 7637-8184 mg daily and vitamin D 800 IU daily  Risk factors for osteopenia/osteoporosis: steroid use  Vitamin D: 30.8  DEXA scan: recommend    Females:  Gynecological exam yearly.        Smoking status: nonsmoker        Follow up: 6 months

## 2024-01-09 NOTE — PROGRESS NOTES
Inflammatory Bowel Disease        Established Patient Note         TODAY'S VISIT DATE:  1/9/2024  The patient's last visit with me was on Visit date not found.     PCP: Mary Carmen Ocampo      Referring MD:   No ref. provider found    History of Present Illness:  Shivani is a 38 year old with Crohn's colitis on infliximab 5 mg/kg every 4 weeks monotherapy here for follow-up.     She was diagnosed with Crohn's colitis by Dr. Ann in January 2015 when she began experiencing diarrhea, abdominal pain, and weight loss. When she was diagnosed she was initially started on a steroid taper, balsalazide and when she did not improve, Humira was started. Fecal calprotectin in march 2018: 445. Adalimumab trough in May 2018 was 1.8, Ab<25, so azathioprine was added. She did not tolerate azathioprine per notes. Due to continued symptoms and low trough level, her Humira dose was increased to 40mg weekly in 2018. She was kept on balsalazide as well.     She did have a rectal abscess in the past, referred to surgery by Dr. Ann, and underwent I&D. No notes to review regarding this and findings.    Adalimumab level in October 2019 was 13, Ab<25. Unsure if this was trough level.    September 2020: Adalimumab trough: 5.7, Ab <25  September 2020: fecal calprotectin 936    When seen initially by me in September 2020, she was doing well clinically. She reported compliance with her Humira weekly. She was no longer taking balsalazide because she felt that it was unhelpful. She reported baseline 1-2 bowel movements a day with occasional hard stools with bloody streaks. She did report occasional breakthrough episodes of increased diarrhea and abdominal pain. She had never had a repeat colonoscopy since starting on Humira. I recommended a colonoscopy at that time. Despite several scheduled colonoscopies, she canceled each appointment and did not follow up till April 2022    She was found to be pregnant in  November 2021. She had a miscarriage during this pregnancy.    In April 2022 on follow-up she was having diarrhea, abdominal pain, weight loss.  She had not been on Humira due to prescription lapse for 3 months.  She was taking NSAIDs and was given prednisone 5mg by PCP which was not helpful. She denied heartburn or reflux.    April 2022 Colonoscopy: multiple scattered medium to large ulcerations throughout the colon.     I started her on infliximab (inflectra) and azathioprine in May 2022.  She has completed loading doses of infliximab and was on 5 mg/kg every 8 weeks.  The azathioprine made her have nausea, vomiting, diarrhea so she stopped taking it and symptoms resolved.  She completed a prednisone taper as well.    August 2022: IFX trough 3.1, Ab < 20     August 2022: Fecal calprotectin: 751    In August 2022 on follow-up she was pregnant.  Clinically improved on infusions.  She remained on 5 mg/kg every 8 weeks  Delivered healthy baby boy via vaginal delivery in January 2023.  She was followed by MFM for the pregnancy.     February 2023 Fecal calprotectin: 155  February IFX trough 8.5, Ab < 20    Diarrhea in July 2023. Campylobacter positive- treated.  Fecal calprotectin 2894.  Given steroid taper and recheck of trough.   July 2023: IFX tough 1.9, Ab < 20  Changed to 5 mg/kg every 4 weeks  October 2023: IFX trough 13, Ab < 20    Today        She does not smoke, no significant alcohol or recreational drug use. No FH of IBD..    IBD History:  IBD disease: Crohn's disease  Disease location: Colon    Current IBD Therapy:  Inflectra 5mg/kg every 4 weeks (May 2022 - present - changed to 4 weeks in July 2023)    Therapeutic Drug Monitoring Labs:  July 2023: IFX tough 1.9, Ab < 20  Changed to 5 mg/kg every 4 weeks  October 2023: IFX trough 13, Ab < 20    Prior IBD Therapies:  Balsalazide  Humira 40mg weekly (2018- Jan 2022)  Prednisone  Azathioprine      Pertinent Endoscopy/Imaging:  January 7, 2015: Colonoscopy (  [facility]): Erythematous mucosa and cratered ulcerations in the ascending and transverse colon. Erythematous mucosa and white plaques in the sigmoid and descending colon. No comment on TI. Pathology: Focal colitis, moderate activity, c/w IBD    April 29, 2022: Colonoscopy: skin tags, normal TI, Med to large patches of deep ulcerations surrounded by normal mucosa in the rectum, sigmoid, descending, transverse, and ascending.  Path: severe active chronic colitis with cryptitis, crypt abscess, dropout      Prior Pertinent Surgeries:   none    Complications:   none    Extraintestinal Manifestations:  None    Review of Systems   Constitutional:  Negative for appetite change and unexpected weight change.   HENT:  Negative for trouble swallowing.    Respiratory:  Negative for chest tightness.    Cardiovascular: Negative.    Gastrointestinal:  Positive for reflux. Negative for abdominal pain and change in bowel habit.   Musculoskeletal: Negative.    Neurological: Negative.    Psychiatric/Behavioral: Negative.     All other systems reviewed and are negative.         Medical/Surgical History:   Past Medical History:   Diagnosis Date    Crohn's colitis     Mild intermittent asthma, uncomplicated      Past Surgical History:   Procedure Laterality Date    COLONOSCOPY      FISTULA REPAIR      TONSILLECTOMY AND ADENOIDECTOMY           Family History:   Family History   Problem Relation Age of Onset    Colon cancer Mother     Diabetes Father     Diabetes Maternal Grandmother     Alzheimer's disease Paternal Grandmother         Social History:   Social History     Socioeconomic History    Marital status: Single   Tobacco Use    Smoking status: Never    Smokeless tobacco: Never   Substance and Sexual Activity    Alcohol use: Not Currently    Drug use: Never    Sexual activity: Yes     Partners: Male     Birth control/protection: OCP     Comment: Currently pregnant        Review of patient's allergies indicates:  No Known  "Allergies    Current Medications:   Outpatient Medications Marked as Taking for the 1/9/24 encounter (Office Visit) with Mary Blanton MD   Medication Sig Dispense Refill    albuterol (PROVENTIL) 2.5 mg /3 mL (0.083 %) nebulizer solution Inhale 2.5 mg into the lungs as needed.      albuterol (PROVENTIL/VENTOLIN HFA) 90 mcg/actuation inhaler Inhale 2 puffs into the lungs every 4 (four) hours as needed.      albuterol-ipratropium (DUO-NEB) 2.5 mg-0.5 mg/3 mL nebulizer solution USE 1 VIAL VIA NEBULIZER EVERY 6 HOURS AS NEEDED FOR WHEEZING      CHEST CONGESTION RELIEF 100 mg/5 mL syrup Take by mouth.      SYMBICORT 160-4.5 mcg/actuation HFAA Inhale 2 puffs into the lungs every 12 (twelve) hours.          Vital Signs:  /76 (BP Location: Left arm, Patient Position: Sitting, BP Method: Large (Automatic))   Pulse 73   Temp 97.7 °F (36.5 °C) (Oral)   Resp 16   Ht 5' 2" (1.575 m)   Wt 71.2 kg (157 lb)   LMP 12/28/2023 (Exact Date)   BMI 28.72 kg/m²      Physical Exam  Constitutional:       Appearance: Normal appearance.   HENT:      Head: Normocephalic and atraumatic.      Mouth/Throat:      Mouth: Mucous membranes are moist.   Eyes:      Conjunctiva/sclera: Conjunctivae normal.   Cardiovascular:      Rate and Rhythm: Normal rate and regular rhythm.   Pulmonary:      Effort: Pulmonary effort is normal.      Breath sounds: Normal breath sounds.   Abdominal:      General: Bowel sounds are normal.      Palpations: Abdomen is soft.   Musculoskeletal:         General: Normal range of motion.      Cervical back: Normal range of motion.   Skin:     General: Skin is warm.   Neurological:      General: No focal deficit present.      Mental Status: She is alert and oriented to person, place, and time. Mental status is at baseline.   Psychiatric:         Mood and Affect: Mood normal.         Behavior: Behavior normal.         Labs: Reviewed  Hgb   Date Value Ref Range Status   07/27/2023 12.5 12.0 - 16.0 g/dL Final "     Albumin Level   Date Value Ref Range Status   07/27/2023 3.4 (L) 3.5 - 5.0 g/dL Final     Iron Level   Date Value Ref Range Status   07/27/2023 30 (L) 50 - 170 ug/dL Final     Ferritin Level   Date Value Ref Range Status   07/27/2023 115.08 4.63 - 204.00 ng/mL Final     Folate Level   Date Value Ref Range Status   07/27/2023 14.2 7.0 - 31.4 ng/mL Final     Vitamin B12 Level   Date Value Ref Range Status   07/27/2023 675 213 - 816 pg/mL Final     Vit D 25 OH   Date Value Ref Range Status   07/27/2023 16.5 (L) 30.0 - 80.0 ng/mL Final     Zinc   Date Value Ref Range Status   01/10/2023 49 (L) 60 - 106 mcg/dL Final     Comment:        -------------------ADDITIONAL INFORMATION-------------------  This test was developed and its performance characteristics   determined by [facility] in a manner consistent with CLIA   requirements. This test has not been cleared or approved by   the U.S. Food and Drug Administration.     Test Performed by:  [facility] [place] [address]  : Neal Bae M.D. Ph.D.; CLIA# [id number]     C-Reactive Protein   Date Value Ref Range Status   07/27/2023 61.10 (H) <5.00 mg/L Final     Calprotectin, F   Date Value Ref Range Status   07/24/2023 2894 (H) <50.0 (Normal) mcg/g Final     Comment:     Interpretation: Abnormal (>120 mcg/g)     Test Performed by:  [place] [address]  : Neal Bae M.D. Ph.D.; CLIA# [id number]     Hepatitis B Surface Antigen   Date Value Ref Range Status   01/10/2023 Nonreactive Nonreactive Final      Quantiferon gold: negative February 2023  Fecal calprotectin: March 2018: 445 --> Sept 2020: 936      Assessment/Plan:    Problem List Items Addressed This Visit          GI    Crohn's disease of colon without complication - Primary     Crohn's colitis based on initial colonoscopy in 2015.    Previously on Humira 40mg weekly, clinically seemed to improve but no endoscopic evaluation to assess for mucosal healing  Adalimumab trough 5.7, Ab < 25 when on Humira 40mg weekly back in Sept 2020  Stopped Humira early 2022 April 2022 Colonoscopy: active Crohn's colitis with scattered ulcerations  Inflectra 5mg/kg started May 2022.  Could not tolerate azathioprine  Clinical improvement on infliximab  Week 14 trough 3.1, Ab < 20 (while pregnant)  August 2022: fecal calprotectin 751  January 2023: calprotectin 155, CRP 2.9  Postpartum: Delivered son on 1/18/2023 February 2023 Fecal calprotectin: 155  February IFX trough 8.5, Ab < 20    Diarrhea in July 2023. Campylobacter positive- treated.  Fecal calprotectin 2894.  Given steroid taper and recheck of trough.   July 2023: IFX tough 1.9, Ab < 20  Changed to 5 mg/kg every 4 weeks  October 2023: IFX trough 13, Ab < 20    Therapeutic dosing now on 5 mg/kg every 4 weeks monotherapy  Clinically improved  Recheck fecal calprotectin  Schedule colonoscopy                      HEALTH MAINTENANCE:     Vaccinations:    Influenza (inactive):  recommended annually   PCV 20 (Prevnar): recommended  Tetanus (TdaP): September 2010, recommended every 10 years  HPV (males and females ages 11-46 yo): N/A  Meningococcal: No risk factors   Hepatitis B: Twinrix #3 today  Hepatitis A:  Twinrix #3 today  MMR (live vaccine): UTD          Chickenpox status/Varicella (live vaccine): immune, +Ab  Shingrix: recommended   COVID-19: recommend    Colorectal cancer risk:  Risk factors: > 8 years of disease, > 1/3 colon involved  - Distribution of colonic disease: > 1/3 of colon  - Year of symptom onset: 2015  - Colonoscopy every 2-3 years after endoscopic remission    Ophthalmologic exam recommended yearly  Dermatologic exam recommended yearly due to risk of skin cancer with IBD/meds    Bone health:  Calcium 3850-1312 mg daily and vitamin D 800 IU daily  Risk factors for osteopenia/osteoporosis:  steroid use  Vitamin D: 13.4, recheck  Ergocalciferol 50,000 IU weekly x 12 weeks if low  DEXA scan: recommend    Females:  Gynecological exam yearly.      Smoking status: nonsmoker      Follow up: 6 months

## 2024-01-11 LAB
AR ZINC, SERUM/PLASMA: 67.8 UG/DL
GAMMA INTERFERON BACKGROUND BLD IA-ACNC: 0.08 IU/ML
M TB IFN-G BLD-IMP: NEGATIVE
M TB IFN-G CD4+ BCKGRND COR BLD-ACNC: 0 IU/ML
M TB IFN-G CD4+CD8+ BCKGRND COR BLD-ACNC: 0.06 IU/ML
MITOGEN IGNF BCKGRD COR BLD-ACNC: 9.92 IU/ML

## 2024-01-12 ENCOUNTER — INFUSION (OUTPATIENT)
Dept: INFUSION THERAPY | Facility: HOSPITAL | Age: 39
End: 2024-01-12
Attending: INTERNAL MEDICINE
Payer: MEDICAID

## 2024-01-12 VITALS
TEMPERATURE: 98 F | HEIGHT: 62 IN | OXYGEN SATURATION: 100 % | HEART RATE: 74 BPM | WEIGHT: 156.94 LBS | RESPIRATION RATE: 20 BRPM | SYSTOLIC BLOOD PRESSURE: 116 MMHG | DIASTOLIC BLOOD PRESSURE: 64 MMHG | BODY MASS INDEX: 28.88 KG/M2

## 2024-01-12 DIAGNOSIS — K50.10 CROHN'S DISEASE OF COLON WITHOUT COMPLICATION: Primary | ICD-10-CM

## 2024-01-12 PROCEDURE — 96375 TX/PRO/DX INJ NEW DRUG ADDON: CPT

## 2024-01-12 PROCEDURE — 96415 CHEMO IV INFUSION ADDL HR: CPT

## 2024-01-12 PROCEDURE — 96413 CHEMO IV INFUSION 1 HR: CPT

## 2024-01-12 PROCEDURE — 63600175 PHARM REV CODE 636 W HCPCS: Performed by: NURSE PRACTITIONER

## 2024-01-12 PROCEDURE — 25000003 PHARM REV CODE 250: Performed by: NURSE PRACTITIONER

## 2024-01-12 RX ORDER — METHYLPREDNISOLONE SOD SUCC 125 MG
40 VIAL (EA) INJECTION
Status: CANCELLED | OUTPATIENT
Start: 2024-02-09

## 2024-01-12 RX ORDER — DIPHENHYDRAMINE HYDROCHLORIDE 50 MG/ML
25 INJECTION, SOLUTION INTRAMUSCULAR; INTRAVENOUS
Status: ACTIVE | OUTPATIENT
Start: 2024-01-12 | End: 2024-01-12

## 2024-01-12 RX ORDER — ACETAMINOPHEN 500 MG
500 TABLET ORAL
Status: CANCELLED | OUTPATIENT
Start: 2024-02-09

## 2024-01-12 RX ORDER — EPINEPHRINE 1 MG/ML
0.3 INJECTION, SOLUTION, CONCENTRATE INTRAVENOUS
Status: CANCELLED | OUTPATIENT
Start: 2024-02-09

## 2024-01-12 RX ORDER — DIPHENHYDRAMINE HYDROCHLORIDE 50 MG/ML
25 INJECTION, SOLUTION INTRAMUSCULAR; INTRAVENOUS
Status: CANCELLED | OUTPATIENT
Start: 2024-02-09

## 2024-01-12 RX ORDER — EPINEPHRINE 1 MG/ML
0.3 INJECTION, SOLUTION, CONCENTRATE INTRAVENOUS
Status: ACTIVE | OUTPATIENT
Start: 2024-01-12 | End: 2024-01-12

## 2024-01-12 RX ORDER — METHYLPREDNISOLONE SOD SUCC 125 MG
40 VIAL (EA) INJECTION
Status: ACTIVE | OUTPATIENT
Start: 2024-01-12 | End: 2024-01-12

## 2024-01-12 RX ORDER — IPRATROPIUM BROMIDE AND ALBUTEROL SULFATE 2.5; .5 MG/3ML; MG/3ML
3 SOLUTION RESPIRATORY (INHALATION)
Status: ACTIVE | OUTPATIENT
Start: 2024-01-12 | End: 2024-01-12

## 2024-01-12 RX ORDER — DIPHENHYDRAMINE HYDROCHLORIDE 50 MG/ML
25 INJECTION, SOLUTION INTRAMUSCULAR; INTRAVENOUS
Status: COMPLETED | OUTPATIENT
Start: 2024-01-12 | End: 2024-01-12

## 2024-01-12 RX ORDER — ACETAMINOPHEN 500 MG
500 TABLET ORAL
Status: COMPLETED | OUTPATIENT
Start: 2024-01-12 | End: 2024-01-12

## 2024-01-12 RX ORDER — METHYLPREDNISOLONE SOD SUCC 125 MG
40 VIAL (EA) INJECTION
Status: COMPLETED | OUTPATIENT
Start: 2024-01-12 | End: 2024-01-12

## 2024-01-12 RX ORDER — IPRATROPIUM BROMIDE AND ALBUTEROL SULFATE 2.5; .5 MG/3ML; MG/3ML
3 SOLUTION RESPIRATORY (INHALATION)
Status: CANCELLED | OUTPATIENT
Start: 2024-02-09

## 2024-01-12 RX ORDER — METHYLPREDNISOLONE SOD SUCC 125 MG
40 VIAL (EA) INJECTION
Status: CANCELLED
Start: 2024-02-09

## 2024-01-12 RX ORDER — ACETAMINOPHEN 325 MG/1
650 TABLET ORAL
Status: ACTIVE | OUTPATIENT
Start: 2024-01-12 | End: 2024-01-12

## 2024-01-12 RX ORDER — ACETAMINOPHEN 325 MG/1
650 TABLET ORAL
Status: CANCELLED | OUTPATIENT
Start: 2024-02-09

## 2024-01-12 RX ADMIN — DIPHENHYDRAMINE HYDROCHLORIDE 25 MG: 50 INJECTION INTRAMUSCULAR; INTRAVENOUS at 10:01

## 2024-01-12 RX ADMIN — SODIUM CHLORIDE 360 MG: 9 INJECTION, SOLUTION INTRAVENOUS at 10:01

## 2024-01-12 RX ADMIN — SODIUM CHLORIDE: 9 INJECTION, SOLUTION INTRAVENOUS at 09:01

## 2024-01-12 RX ADMIN — METHYLPREDNISOLONE SODIUM SUCCINATE 40 MG: 125 INJECTION, POWDER, FOR SOLUTION INTRAMUSCULAR; INTRAVENOUS at 10:01

## 2024-01-12 RX ADMIN — ACETAMINOPHEN 500 MG: 500 TABLET ORAL at 10:01

## 2024-01-18 ENCOUNTER — LAB VISIT (OUTPATIENT)
Dept: LAB | Facility: HOSPITAL | Age: 39
End: 2024-01-18
Attending: INTERNAL MEDICINE
Payer: MEDICAID

## 2024-01-22 ENCOUNTER — ANESTHESIA EVENT (OUTPATIENT)
Dept: ENDOSCOPY | Facility: HOSPITAL | Age: 39
End: 2024-01-22
Payer: MEDICAID

## 2024-01-22 ENCOUNTER — PATIENT MESSAGE (OUTPATIENT)
Dept: ADMINISTRATIVE | Facility: OTHER | Age: 39
End: 2024-01-22
Payer: MEDICAID

## 2024-01-22 LAB — CALPROTECTIN STL-MCNT: 77.3 MCG/G

## 2024-01-22 NOTE — ANESTHESIA PREPROCEDURE EVALUATION
"                                                                                                             01/22/2024  Shivani Samson is a 38 y.o., female with PMHx of asthma presents for colonoscopy secondary to Crohn's.    No BETA BLOCKER USE    Active Ambulatory Problems     Diagnosis Date Noted    Crohn's disease of colon without complication 05/10/2022    Iron deficiency anemia 01/11/2023    External hemorrhoids 06/21/2023     Resolved Ambulatory Problems     Diagnosis Date Noted    No Resolved Ambulatory Problems     Past Medical History:   Diagnosis Date    Crohn's colitis     Mild intermittent asthma, uncomplicated        Pre-op Assessment    I have reviewed the NPO Status.      Review of Systems  Anesthesia Hx:  No problems with previous Anesthesia                Social:  Non-Smoker       Cardiovascular:  Cardiovascular Normal                                            Pulmonary:    Asthma mild                   Renal/:  Renal/ Normal                 Hepatic/GI:  Bowel Prep.                Neurological:  Neurology Normal                                      Endocrine:  Endocrine Normal              Vitals:    01/24/24 1328 01/24/24 1333   BP: 113/82 113/82   BP Location: Left arm    Patient Position: Lying    Pulse: 99    Resp: 20    Temp: 36.7 °C (98 °F)    TempSrc: Oral    SpO2: 97%    Weight: 67.9 kg (149 lb 12.8 oz)    Height: 5' 2" (1.575 m)          Physical Exam  General: Alert, Cooperative and Well nourished    Airway:  Mallampati: II   Mouth Opening: Normal  TM Distance: Normal  Tongue: Normal  Neck ROM: Normal ROM    Dental:  Intact    Chest/Lungs:  Clear to auscultation, Normal Respiratory Rate    Heart:  Rate: Normal  Rhythm: Regular Rhythm  Sounds: Normal       Latest Reference Range & Units 01/24/24 13:39   Preg Test, Ur Negative  Negative     Lab Results   Component Value Date    WBC 6.41 01/09/2024    HGB 13.6 01/09/2024    HCT 41.1 01/09/2024    MCV 90.9 01/09/2024     " 01/09/2024       CMP  Sodium Level   Date Value Ref Range Status   01/09/2024 138 136 - 145 mmol/L Final     Potassium Level   Date Value Ref Range Status   01/09/2024 3.8 3.5 - 5.1 mmol/L Final     Carbon Dioxide   Date Value Ref Range Status   01/09/2024 26 22 - 29 mmol/L Final     Blood Urea Nitrogen   Date Value Ref Range Status   01/09/2024 13.0 7.0 - 18.7 mg/dL Final     Creatinine   Date Value Ref Range Status   01/09/2024 0.83 0.55 - 1.02 mg/dL Final     Calcium Level Total   Date Value Ref Range Status   01/09/2024 9.3 8.4 - 10.2 mg/dL Final     Albumin Level   Date Value Ref Range Status   01/09/2024 3.8 3.5 - 5.0 g/dL Final     Bilirubin Total   Date Value Ref Range Status   01/09/2024 0.6 <=1.5 mg/dL Final     Alkaline Phosphatase   Date Value Ref Range Status   01/09/2024 52 40 - 150 unit/L Final     Aspartate Aminotransferase   Date Value Ref Range Status   01/09/2024 14 5 - 34 unit/L Final     Alanine Aminotransferase   Date Value Ref Range Status   01/09/2024 12 0 - 55 unit/L Final     eGFR   Date Value Ref Range Status   01/09/2024 >60 mls/min/1.73/m2 Final         Anesthesia Plan  Type of Anesthesia, risks & benefits discussed:    Anesthesia Type: Gen Natural Airway  Intra-op Monitoring Plan: Standard ASA Monitors  Post Op Pain Control Plan: IV/PO Opioids PRN  Induction:  IV  Informed Consent: Informed consent signed with the Patient and all parties understand the risks and agree with anesthesia plan.  All questions answered.   ASA Score: 2  Day of Surgery Review of History & Physical: H&P Update referred to the surgeon/provider.    Ready For Surgery From Anesthesia Perspective.     .

## 2024-01-24 ENCOUNTER — ANESTHESIA (OUTPATIENT)
Dept: ENDOSCOPY | Facility: HOSPITAL | Age: 39
End: 2024-01-24
Payer: MEDICAID

## 2024-01-24 ENCOUNTER — HOSPITAL ENCOUNTER (OUTPATIENT)
Facility: HOSPITAL | Age: 39
Discharge: HOME | End: 2024-01-24
Attending: INTERNAL MEDICINE | Admitting: INTERNAL MEDICINE
Payer: MEDICAID

## 2024-01-24 DIAGNOSIS — K50.10 CROHN'S DISEASE OF COLON WITHOUT COMPLICATION: ICD-10-CM

## 2024-01-24 LAB
B-HCG UR QL: NEGATIVE
CTP QC/QA: YES

## 2024-01-24 PROCEDURE — 45380 COLONOSCOPY AND BIOPSY: CPT | Performed by: INTERNAL MEDICINE

## 2024-01-24 PROCEDURE — 81025 URINE PREGNANCY TEST: CPT | Performed by: ANESTHESIOLOGY

## 2024-01-24 PROCEDURE — 37000008 HC ANESTHESIA 1ST 15 MINUTES: Performed by: INTERNAL MEDICINE

## 2024-01-24 PROCEDURE — 37000009 HC ANESTHESIA EA ADD 15 MINS: Performed by: INTERNAL MEDICINE

## 2024-01-24 PROCEDURE — 63600175 PHARM REV CODE 636 W HCPCS: Performed by: ANESTHESIOLOGY

## 2024-01-24 PROCEDURE — 88305 TISSUE EXAM BY PATHOLOGIST: CPT | Mod: TC | Performed by: INTERNAL MEDICINE

## 2024-01-24 PROCEDURE — D9220A PRA ANESTHESIA: Mod: ,,, | Performed by: NURSE ANESTHETIST, CERTIFIED REGISTERED

## 2024-01-24 PROCEDURE — 27201423 OPTIME MED/SURG SUP & DEVICES STERILE SUPPLY: Performed by: INTERNAL MEDICINE

## 2024-01-24 PROCEDURE — 63600175 PHARM REV CODE 636 W HCPCS: Performed by: NURSE ANESTHETIST, CERTIFIED REGISTERED

## 2024-01-24 PROCEDURE — 25000003 PHARM REV CODE 250: Performed by: NURSE ANESTHETIST, CERTIFIED REGISTERED

## 2024-01-24 RX ORDER — LIDOCAINE HYDROCHLORIDE 20 MG/ML
INJECTION, SOLUTION EPIDURAL; INFILTRATION; INTRACAUDAL; PERINEURAL
Status: DISCONTINUED | OUTPATIENT
Start: 2024-01-24 | End: 2024-01-24

## 2024-01-24 RX ORDER — ERGOCALCIFEROL 1.25 MG/1
50000 CAPSULE ORAL
Qty: 24 CAPSULE | Refills: 0 | Status: SHIPPED | OUTPATIENT
Start: 2024-01-24 | End: 2024-07-04

## 2024-01-24 RX ORDER — LIDOCAINE HYDROCHLORIDE 10 MG/ML
1 INJECTION, SOLUTION EPIDURAL; INFILTRATION; INTRACAUDAL; PERINEURAL ONCE
Status: DISCONTINUED | OUTPATIENT
Start: 2024-01-24 | End: 2024-01-24 | Stop reason: HOSPADM

## 2024-01-24 RX ORDER — PROPOFOL 10 MG/ML
VIAL (ML) INTRAVENOUS
Status: DISCONTINUED | OUTPATIENT
Start: 2024-01-24 | End: 2024-01-24

## 2024-01-24 RX ORDER — SODIUM CHLORIDE, SODIUM LACTATE, POTASSIUM CHLORIDE, CALCIUM CHLORIDE 600; 310; 30; 20 MG/100ML; MG/100ML; MG/100ML; MG/100ML
INJECTION, SOLUTION INTRAVENOUS CONTINUOUS
Status: DISCONTINUED | OUTPATIENT
Start: 2024-01-24 | End: 2024-01-24 | Stop reason: HOSPADM

## 2024-01-24 RX ORDER — LUBIPROSTONE 8 UG/1
8 CAPSULE ORAL 2 TIMES DAILY
Qty: 60 CAPSULE | Refills: 6 | Status: SHIPPED | OUTPATIENT
Start: 2024-01-24

## 2024-01-24 RX ADMIN — PROPOFOL 30 MG: 10 INJECTION, EMULSION INTRAVENOUS at 02:01

## 2024-01-24 RX ADMIN — PROPOFOL 80 MG: 10 INJECTION, EMULSION INTRAVENOUS at 02:01

## 2024-01-24 RX ADMIN — PROPOFOL 50 MG: 10 INJECTION, EMULSION INTRAVENOUS at 02:01

## 2024-01-24 RX ADMIN — SODIUM CHLORIDE, POTASSIUM CHLORIDE, SODIUM LACTATE AND CALCIUM CHLORIDE: 600; 310; 30; 20 INJECTION, SOLUTION INTRAVENOUS at 01:01

## 2024-01-24 RX ADMIN — LIDOCAINE HYDROCHLORIDE 50 MG: 20 INJECTION, SOLUTION EPIDURAL; INFILTRATION; INTRACAUDAL; PERINEURAL at 02:01

## 2024-01-24 NOTE — TRANSFER OF CARE
Anesthesia Transfer of Care Note    Patient: Shivani Samson    Procedure(s) Performed: Procedure(s) (LRB):  COLONOSCOPY, WITH 1 OR MORE BIOPSIES (N/A)    Patient location: GI    Anesthesia Type: general    Post pain: adequate analgesia    Post assessment: no apparent anesthetic complications and tolerated procedure well    Post vital signs: stable    Level of consciousness: awake    Nausea/Vomiting: no nausea/vomiting    Complications: none    Transfer of care protocol was followed

## 2024-01-24 NOTE — PROVATION PATIENT INSTRUCTIONS
Discharge Summary/Instructions after an Endoscopic Procedure  Patient Name: Shivani Samson  Patient MRN: [id number]  Patient YOB: 1985 Wednesday, January 24, 2024  Mary Blanton MD  Dear patient,  As a result of recent federal legislation (The Federal Cures Act), you may   receive lab or pathology results from your procedure in your [facility]   account before your physician is able to contact you. Your physician or   their representative will relay the results to you with their   recommendations at their soonest availability.  Thank you,  RESTRICTIONS:  During your procedure today, you received medications for sedation.  These   medications may affect your judgment, balance and coordination.  Therefore,   for 24 hours, you have the following restrictions:   - DO NOT drive a car, operate machinery, make legal/financial decisions,   sign important papers or drink alcohol.    ACTIVITY:  Today: no heavy lifting, straining or running due to procedural   sedation/anesthesia.  The following day: return to full activity including work.  DIET:  Eat and drink normally unless instructed otherwise.     TREATMENT FOR COMMON SIDE EFFECTS:  - Mild abdominal pain, nausea, belching, bloating or excessive gas:  rest,   eat lightly and use a heating pad.  - Sore Throat: treat with throat lozenges and/or gargle with warm salt   water.  - Because air was used during the procedure, expelling large amounts of air   from your rectum or belching is normal.  - If a bowel prep was taken, you may not have a bowel movement for 1-3 days.    This is normal.  SYMPTOMS TO WATCH FOR AND REPORT TO YOUR PHYSICIAN:  1. Abdominal pain or bloating, other than gas cramps.  2. Chest pain.  3. Back pain.  4. Signs of infection such as: chills or fever occurring within 24 hours   after the procedure.  5. Rectal bleeding, which would show as bright red, maroon, or black stools.   (A tablespoon of blood from the rectum is not serious,  especially if   hemorrhoids are present.)  6. Vomiting.  7. Weakness or dizziness.  GO DIRECTLY TO THE NEAREST EMERGENCY ROOM IF YOU HAVE ANY OF THE FOLLOWING:      Difficulty breathing              Chills and/or fever over 101 F   Persistent vomiting and/or vomiting blood   Severe abdominal pain   Severe chest pain   Black, tarry stools   Bleeding- more than one tablespoon   Any other symptom or condition that you feel may need urgent attention  Your doctor recommends these additional instructions:  If any biopsies were taken, your doctors clinic will contact you in 1 to 2   weeks with any results.  - Patient has a contact number available for emergencies.  The signs and   symptoms of potential delayed complications were discussed with the   patient.  Return to normal activities tomorrow.  Written discharge   instructions were provided to the patient.   - Discharge patient to home.   - Resume previous diet.   - Continue present medications.   - Await pathology results.   - Return to GI clinic in 6 months.   - Use Amitiza (lubiprostone) 8 mcg PO BID.   - No recommendation at this time regarding repeat colonoscopy.  For questions, problems or results please call your physician - Mary Blanton MD at Work:  [phone], Work:  [phone].  [facility] , EMERGENCY ROOM PHONE NUMBER: [phone]  IF A COMPLICATION OR EMERGENCY SITUATION ARISES AND YOU ARE UNABLE TO REACH   YOUR PHYSICIAN - GO DIRECTLY TO THE EMERGENCY ROOM.  Mary Blanton MD  1/24/2024 4:00:50 PM  This report has been verified and signed electronically.  Dear patient,  As a result of recent federal legislation (The Federal Cures Act), you may   receive lab or pathology results from your procedure in your [facility]   account before your physician is able to contact you. Your physician or   their representative will relay the results to you with their   recommendations at their soonest availability.  Thank  you,  PROVATION

## 2024-01-24 NOTE — H&P
Colonoscopy History and Physical    Patient Name: Shivani Samson  MRN: [id number]  : 1985  Date of Procedure:  2024  Referring Physician: Mary Blanton MD  Primary Physician: Mary Carmen Ocampo NP  Procedure Physician: Mary Blanton MD, MPH     Procedure - Colonoscopy  ASA - per anesthesia  Mallampati - per anesthesia  History of Anesthesia problems - no  Family history Anesthesia problems -  no   Plan of anesthesia - General    Diagnosis: Crohn's disease  Chief Complaint: Same as above    HPI: Patient is an 38 y.o. female is here for the above.     Shivani is a 38 year old with Crohn's colitis on infliximab 5 mg/kg every 4 weeks monotherapy here for a colonoscopy.    She was diagnosed with Crohn's colitis by Dr. Ann in 2015 when she began experiencing diarrhea, abdominal pain, and weight loss. When she was diagnosed she was initially started on a steroid taper, balsalazide and when she did not improve, Humira was started. Fecal calprotectin in 2018: 445. Adalimumab trough in May 2018 was 1.8, Ab<25, so azathioprine was added. She did not tolerate azathioprine per notes. Due to continued symptoms and low trough level, her Humira dose was increased to 40mg weekly in 2018. She was kept on balsalazide as well.     She did have a rectal abscess in the past, referred to surgery by Dr. Ann, and underwent I&D. No notes to review regarding this and findings.    Adalimumab level in 2019 was 13, Ab<25. Unsure if this was trough level.     2020: Adalimumab trough: 5.7, Ab <25  2020: fecal calprotectin 936    When seen initially by me in 2020, she was doing well clinically. She reported compliance with her Humira weekly. She was no longer taking balsalazide because she felt that it was unhelpful. She reported baseline 1-2 bowel movements a day with occasional hard stools with bloody streaks. She did report occasional breakthrough episodes of  increased diarrhea and abdominal pain. She had never had a repeat colonoscopy since starting on Humira. I recommended a colonoscopy at that time. Despite several scheduled colonoscopies, she canceled each appointment and did not follow up till April 2022    She was found to be pregnant in November 2021. She had a miscarriage during this pregnancy.     In April 2022 on follow-up she was having diarrhea, abdominal pain, weight loss.  She had not been on Humira due to prescription lapse for 3 months.  She was taking NSAIDs and was given prednisone 5mg by PCP which was not helpful. She denied heartburn or reflux.     April 2022 Colonoscopy: multiple scattered medium to large ulcerations throughout the colon.      I started her on infliximab (inflectra) and azathioprine in May 2022.  She has completed loading doses of infliximab and was on 5 mg/kg every 8 weeks.  The azathioprine made her have nausea, vomiting, diarrhea so she stopped taking it and symptoms resolved.  She completed a prednisone taper as well.     August 2022: IFX trough 3.1, Ab < 20      August 2022: Fecal calprotectin: 751     In August 2022 on follow-up she was pregnant.  Clinically improved on infusions.  She remained on 5 mg/kg every 8 weeks  Delivered healthy baby boy via vaginal delivery in January 2023.  She was followed by M for the pregnancy.      February 2023 Fecal calprotectin: 155  February IFX trough 8.5, Ab < 20     Diarrhea in July 2023. Campylobacter positive- treated.  Fecal calprotectin 2894.  Given steroid taper and recheck of trough.   July 2023: IFX tough 1.9, Ab < 20  Changed to 5 mg/kg every 4 weeks  October 2023: IFX trough 13, Ab < 20    January 2024: Fecal calprotectin: 77.3     Today, patient reports that she is feeling well overall.  She has not currently having any complications with infusions and reports compliance.  She does report constipation with BRBPR.  She states that she notices bright red blood on the paper when  she wipes and mixed into her stool.  She reports having a bowel movement approximately every 2-3 days that are normal caliber/size.  She denies other associated symptoms such as abdominal pain, diarrhea, urgency, nocturnal stools, loss of appetite, and weight loss.    She does not smoke, no significant alcohol or recreational drug use. No FH of IBD..     IBD History:  IBD disease: Crohn's disease  Disease location: Colon     Current IBD Therapy:  Inflectra 5mg/kg every 4 weeks (May 2022 - present - changed to 4 weeks in July 2023)     Therapeutic Drug Monitoring Labs:  July 2023: IFX tough 1.9, Ab < 20  Changed to 5 mg/kg every 4 weeks  October 2023: IFX trough 13, Ab < 20     Prior IBD Therapies:  Balsalazide  Humira 40mg weekly (2018- Jan 2022)  Prednisone  Azathioprine        Pertinent Endoscopy/Imaging:  January 7, 2015: Colonoscopy (Dr. Ann): Erythematous mucosa and cratered ulcerations in the ascending and transverse colon. Erythematous mucosa and white plaques in the sigmoid and descending colon. No comment on TI. Pathology: Focal colitis, moderate activity, c/w IBD     April 29, 2022: Colonoscopy: skin tags, normal TI, Med to large patches of deep ulcerations surrounded by normal mucosa in the rectum, sigmoid, descending, transverse, and ascending.  Path: severe active chronic colitis with cryptitis, crypt abscess, dropout        Prior Pertinent Surgeries:   none     Complications:   none     Extraintestinal Manifestations:  None        ROS:  Constitutional: No fevers, chills, No weight loss  CV: No chest pain  Pulm: No cough, No shortness of breath  GI: see HPI    Medical History:   Past Medical History:   Diagnosis Date    Crohn's colitis     Mild intermittent asthma, uncomplicated          Surgical History:   Past Surgical History:   Procedure Laterality Date    COLONOSCOPY      FISTULA REPAIR      TONSILLECTOMY AND ADENOIDECTOMY         Family History:   Family History   Problem Relation Age of Onset     Colon cancer Mother     Diabetes Father     Diabetes Maternal Grandmother     Alzheimer's disease Paternal Grandmother    .    Social History:   Social History     Socioeconomic History    Marital status: Single   Tobacco Use    Smoking status: Never    Smokeless tobacco: Never   Substance and Sexual Activity    Alcohol use: Not Currently    Drug use: Never    Sexual activity: Yes     Partners: Male     Birth control/protection: OCP     Comment: Currently pregnant       Review of patient's allergies indicates:  No Known Allergies    Medications:   Medications Prior to Admission   Medication Sig Dispense Refill Last Dose    albuterol (PROVENTIL) 2.5 mg /3 mL (0.083 %) nebulizer solution Inhale 2.5 mg into the lungs as needed.       albuterol (PROVENTIL/VENTOLIN HFA) 90 mcg/actuation inhaler Inhale 2 puffs into the lungs every 4 (four) hours as needed.       albuterol-ipratropium (DUO-NEB) 2.5 mg-0.5 mg/3 mL nebulizer solution USE 1 VIAL VIA NEBULIZER EVERY 6 HOURS AS NEEDED FOR WHEEZING       CHEST CONGESTION RELIEF 100 mg/5 mL syrup Take by mouth.       CRYSELLE, 28, 0.3-30 mg-mcg per tablet Take 1 tablet by mouth once daily.       ergocalciferol (ERGOCALCIFEROL) 50,000 unit Cap Take 1 capsule (50,000 Units total) by mouth every 7 days. (Patient not taking: Reported on 1/9/2024) 12 capsule 0     ferrous sulfate (FEOSOL) 325 mg (65 mg iron) Tab tablet Take 1 tablet by mouth once daily.   More than a month    nebulizer and compressor Manju Comp-Air XLT Compressor for Nebulizer   AS DIRECTED       polyethylene glycol (MOVIPREP) 100-7.5-2.691 gram solution Take as directed prior to colonoscopy 1 kit 0     predniSONE (DELTASONE) 10 MG tablet Take 40 mg by mouth daily x 2 weeks then take 30 mg by mouth daily x 2 weeks then take 20 mg by mouth daily x 2 weeks then take 15 mg by mouth daily x 1 week then take 10 mg by mouth daily x 1 week then take 5 mg by mouth daily x 1 week. (Patient not taking: Reported on 1/9/2024)  "147 tablet 0     SYMBICORT 160-4.5 mcg/actuation HFAA Inhale 2 puffs into the lungs every 12 (twelve) hours.       triamcinolone acetonide 0.1% (KENALOG) 0.1 % ointment SMARTSIG:sparingly Topical Twice Daily            Physical Exam:    Vital Signs:   Vitals:    01/24/24 1333   BP: 113/82   Pulse:    Resp:    Temp:      /82   Pulse 99   Temp 98 °F (36.7 °C) (Oral)   Resp 20   Ht 5' 2" (1.575 m)   Wt 67.9 kg (149 lb 12.8 oz)   LMP 12/28/2023 (Exact Date)   SpO2 100%   Breastfeeding No   BMI 27.40 kg/m²     General:          Well appearing in no acute distress  Lungs: Clear to auscultation bilaterally, respirations unlabored  Heart: Regular rate and rhythm, S1 and S2 normal, no obvious murmurs  Abdomen:         Soft, non-tender, bowel sounds normal, no masses, no organomegaly        Labs:  Lab Results   Component Value Date    WBC 6.41 01/09/2024    HGB 13.6 01/09/2024    HCT 41.1 01/09/2024    MCV 90.9 01/09/2024     01/09/2024     Lab Results   Component Value Date    INR 0.96 09/07/2022     Lab Results   Component Value Date     01/09/2024    K 3.8 01/09/2024    CO2 26 01/09/2024    BUN 13.0 01/09/2024    CREATININE 0.83 01/09/2024    LABPROT 8.3 01/09/2024    ALBUMIN 3.8 01/09/2024    BILITOT 0.6 01/09/2024    ALKPHOS 52 01/09/2024    ALT 12 01/09/2024    AST 14 01/09/2024         Assessment and Plan:    History reviewed, vital signs satisfactory, cardiopulmonary status satisfactory.  I have explained the sedation options, risks, benefits, and alternatives of this endoscopic procedure to the patient including but not limited to bleeding, inflammation, infection, perforation, and death.  All questions were answered and the patient consented to proceed with procedure as planned.   The patient is deemed an appropriate candidate for the sedation as planned.      Mary Blanton MD, MPH   of Clinical Medicine  Gastroenterology and Hepatology  [facility]    1/24/2024  2:24 PM

## 2024-01-24 NOTE — ADDENDUM NOTE
Addendum  created 01/24/24 1556 by Jeanette Lozano, CRNA    Flowsheet accepted, Intraprocedure Flowsheets edited

## 2024-01-24 NOTE — ANESTHESIA POSTPROCEDURE EVALUATION
Anesthesia Post Evaluation    Patient: Shivani Samson    Procedure(s) Performed: Procedure(s) (LRB):  COLONOSCOPY, WITH 1 OR MORE BIOPSIES (N/A)    Final Anesthesia Type: general      Patient location during evaluation: GI PACU  Patient participation: Yes- Able to Participate  Level of consciousness: awake and alert  Post-procedure vital signs: reviewed and stable  Pain management: adequate  Airway patency: patent    PONV status at discharge: No PONV  Anesthetic complications: no      Cardiovascular status: hemodynamically stable  Respiratory status: unassisted and room air  Follow-up not needed.                Pain/Lupe Score: Lupe Score: 9 (1/24/2024  3:05 PM)

## 2024-01-25 VITALS
HEIGHT: 62 IN | HEART RATE: 68 BPM | BODY MASS INDEX: 27.57 KG/M2 | TEMPERATURE: 98 F | RESPIRATION RATE: 18 BRPM | DIASTOLIC BLOOD PRESSURE: 79 MMHG | OXYGEN SATURATION: 99 % | WEIGHT: 149.81 LBS | SYSTOLIC BLOOD PRESSURE: 112 MMHG

## 2024-01-26 LAB
ESTROGEN SERPL-MCNC: NORMAL PG/ML
INSULIN SERPL-ACNC: NORMAL U[IU]/ML
LAB AP CLINICAL INFORMATION: NORMAL
LAB AP GROSS DESCRIPTION: NORMAL
LAB AP REPORT FOOTNOTES: NORMAL
O+P STL MICRO: NORMAL
T3RU NFR SERPL: NORMAL %

## 2024-02-09 ENCOUNTER — INFUSION (OUTPATIENT)
Dept: INFUSION THERAPY | Facility: HOSPITAL | Age: 39
End: 2024-02-09
Attending: INTERNAL MEDICINE
Payer: MEDICAID

## 2024-02-09 VITALS
DIASTOLIC BLOOD PRESSURE: 57 MMHG | BODY MASS INDEX: 28.76 KG/M2 | RESPIRATION RATE: 20 BRPM | HEART RATE: 82 BPM | TEMPERATURE: 98 F | SYSTOLIC BLOOD PRESSURE: 96 MMHG | OXYGEN SATURATION: 95 % | WEIGHT: 156.31 LBS | HEIGHT: 62 IN

## 2024-02-09 DIAGNOSIS — K50.10 CROHN'S DISEASE OF COLON WITHOUT COMPLICATION: Primary | ICD-10-CM

## 2024-02-09 PROCEDURE — 96415 CHEMO IV INFUSION ADDL HR: CPT

## 2024-02-09 PROCEDURE — 96413 CHEMO IV INFUSION 1 HR: CPT

## 2024-02-09 PROCEDURE — 63600175 PHARM REV CODE 636 W HCPCS: Performed by: NURSE PRACTITIONER

## 2024-02-09 PROCEDURE — 96375 TX/PRO/DX INJ NEW DRUG ADDON: CPT

## 2024-02-09 PROCEDURE — 25000003 PHARM REV CODE 250: Performed by: NURSE PRACTITIONER

## 2024-02-09 RX ORDER — DIPHENHYDRAMINE HYDROCHLORIDE 50 MG/ML
25 INJECTION INTRAMUSCULAR; INTRAVENOUS
Status: CANCELLED | OUTPATIENT
Start: 2024-03-08

## 2024-02-09 RX ORDER — METHYLPREDNISOLONE SOD SUCC 125 MG
40 VIAL (EA) INJECTION
Status: CANCELLED | OUTPATIENT
Start: 2024-03-08

## 2024-02-09 RX ORDER — ACETAMINOPHEN 325 MG/1
650 TABLET ORAL
Status: CANCELLED | OUTPATIENT
Start: 2024-03-08

## 2024-02-09 RX ORDER — IPRATROPIUM BROMIDE AND ALBUTEROL SULFATE 2.5; .5 MG/3ML; MG/3ML
3 SOLUTION RESPIRATORY (INHALATION)
Status: CANCELLED | OUTPATIENT
Start: 2024-03-08

## 2024-02-09 RX ORDER — DIPHENHYDRAMINE HYDROCHLORIDE 50 MG/ML
25 INJECTION INTRAMUSCULAR; INTRAVENOUS
Status: COMPLETED | OUTPATIENT
Start: 2024-02-09 | End: 2024-02-09

## 2024-02-09 RX ORDER — METHYLPREDNISOLONE SOD SUCC 125 MG
40 VIAL (EA) INJECTION
Status: COMPLETED | OUTPATIENT
Start: 2024-02-09 | End: 2024-02-09

## 2024-02-09 RX ORDER — METHYLPREDNISOLONE SOD SUCC 125 MG
40 VIAL (EA) INJECTION
Status: ACTIVE | OUTPATIENT
Start: 2024-02-09 | End: 2024-02-09

## 2024-02-09 RX ORDER — EPINEPHRINE 1 MG/ML
0.3 INJECTION, SOLUTION, CONCENTRATE INTRAVENOUS
Status: CANCELLED | OUTPATIENT
Start: 2024-03-08

## 2024-02-09 RX ORDER — METHYLPREDNISOLONE SOD SUCC 125 MG
40 VIAL (EA) INJECTION
Status: CANCELLED
Start: 2024-03-08

## 2024-02-09 RX ORDER — ACETAMINOPHEN 500 MG
500 TABLET ORAL
Status: COMPLETED | OUTPATIENT
Start: 2024-02-09 | End: 2024-02-09

## 2024-02-09 RX ORDER — ACETAMINOPHEN 500 MG
500 TABLET ORAL
Status: CANCELLED | OUTPATIENT
Start: 2024-03-08

## 2024-02-09 RX ORDER — ACETAMINOPHEN 325 MG/1
650 TABLET ORAL
Status: ACTIVE | OUTPATIENT
Start: 2024-02-09 | End: 2024-02-09

## 2024-02-09 RX ORDER — EPINEPHRINE 1 MG/ML
0.3 INJECTION, SOLUTION, CONCENTRATE INTRAVENOUS
Status: ACTIVE | OUTPATIENT
Start: 2024-02-09 | End: 2024-02-09

## 2024-02-09 RX ORDER — IPRATROPIUM BROMIDE AND ALBUTEROL SULFATE 2.5; .5 MG/3ML; MG/3ML
3 SOLUTION RESPIRATORY (INHALATION)
Status: ACTIVE | OUTPATIENT
Start: 2024-02-09 | End: 2024-02-09

## 2024-02-09 RX ORDER — DIPHENHYDRAMINE HYDROCHLORIDE 50 MG/ML
25 INJECTION INTRAMUSCULAR; INTRAVENOUS
Status: ACTIVE | OUTPATIENT
Start: 2024-02-09 | End: 2024-02-09

## 2024-02-09 RX ADMIN — ACETAMINOPHEN 500 MG: 500 TABLET, FILM COATED ORAL at 09:02

## 2024-02-09 RX ADMIN — SODIUM CHLORIDE: 9 INJECTION, SOLUTION INTRAVENOUS at 09:02

## 2024-02-09 RX ADMIN — METHYLPREDNISOLONE SODIUM SUCCINATE 40 MG: 125 INJECTION, POWDER, FOR SOLUTION INTRAMUSCULAR; INTRAVENOUS at 09:02

## 2024-02-09 RX ADMIN — DIPHENHYDRAMINE HYDROCHLORIDE 25 MG: 50 INJECTION INTRAMUSCULAR; INTRAVENOUS at 09:02

## 2024-02-09 RX ADMIN — SODIUM CHLORIDE 350 MG: 9 INJECTION, SOLUTION INTRAVENOUS at 10:02

## 2024-02-22 ENCOUNTER — TELEPHONE (OUTPATIENT)
Dept: GASTROENTEROLOGY | Facility: CLINIC | Age: 39
End: 2024-02-22
Payer: MEDICAID

## 2024-02-22 NOTE — TELEPHONE ENCOUNTER
Call from Lupe with Dr. Coello. Dr. Coello would like to place pt on Doxycycline for 4 weeks to treat folliculitis. I spoke with Dr. Blanton. She indicates this is ok to take.

## 2024-03-08 ENCOUNTER — INFUSION (OUTPATIENT)
Dept: INFUSION THERAPY | Facility: HOSPITAL | Age: 39
End: 2024-03-08
Attending: INTERNAL MEDICINE
Payer: MEDICAID

## 2024-03-08 VITALS
DIASTOLIC BLOOD PRESSURE: 58 MMHG | RESPIRATION RATE: 20 BRPM | SYSTOLIC BLOOD PRESSURE: 94 MMHG | HEIGHT: 62 IN | BODY MASS INDEX: 30.27 KG/M2 | WEIGHT: 164.5 LBS | TEMPERATURE: 98 F | OXYGEN SATURATION: 99 % | HEART RATE: 72 BPM

## 2024-03-08 DIAGNOSIS — K50.10 CROHN'S DISEASE OF COLON WITHOUT COMPLICATION: Primary | ICD-10-CM

## 2024-03-08 PROCEDURE — 96375 TX/PRO/DX INJ NEW DRUG ADDON: CPT

## 2024-03-08 PROCEDURE — 25000003 PHARM REV CODE 250: Performed by: NURSE PRACTITIONER

## 2024-03-08 PROCEDURE — 63600175 PHARM REV CODE 636 W HCPCS: Mod: JG | Performed by: NURSE PRACTITIONER

## 2024-03-08 PROCEDURE — 96413 CHEMO IV INFUSION 1 HR: CPT

## 2024-03-08 PROCEDURE — 96415 CHEMO IV INFUSION ADDL HR: CPT

## 2024-03-08 RX ORDER — DIPHENHYDRAMINE HYDROCHLORIDE 50 MG/ML
25 INJECTION INTRAMUSCULAR; INTRAVENOUS
Status: CANCELLED | OUTPATIENT
Start: 2024-04-05

## 2024-03-08 RX ORDER — IPRATROPIUM BROMIDE AND ALBUTEROL SULFATE 2.5; .5 MG/3ML; MG/3ML
3 SOLUTION RESPIRATORY (INHALATION)
Status: CANCELLED | OUTPATIENT
Start: 2024-04-05

## 2024-03-08 RX ORDER — ACETAMINOPHEN 500 MG
500 TABLET ORAL
Status: COMPLETED | OUTPATIENT
Start: 2024-03-08 | End: 2024-03-08

## 2024-03-08 RX ORDER — METHYLPREDNISOLONE SOD SUCC 125 MG
40 VIAL (EA) INJECTION
Status: CANCELLED | OUTPATIENT
Start: 2024-04-05

## 2024-03-08 RX ORDER — ACETAMINOPHEN 500 MG
500 TABLET ORAL
Status: CANCELLED | OUTPATIENT
Start: 2024-04-05

## 2024-03-08 RX ORDER — METHYLPREDNISOLONE SOD SUCC 125 MG
40 VIAL (EA) INJECTION
Status: CANCELLED
Start: 2024-04-05

## 2024-03-08 RX ORDER — EPINEPHRINE 1 MG/ML
0.3 INJECTION, SOLUTION, CONCENTRATE INTRAVENOUS
Status: CANCELLED | OUTPATIENT
Start: 2024-04-05

## 2024-03-08 RX ORDER — DIPHENHYDRAMINE HYDROCHLORIDE 50 MG/ML
25 INJECTION INTRAMUSCULAR; INTRAVENOUS
Status: COMPLETED | OUTPATIENT
Start: 2024-03-08 | End: 2024-03-08

## 2024-03-08 RX ORDER — ACETAMINOPHEN 325 MG/1
650 TABLET ORAL
Status: CANCELLED | OUTPATIENT
Start: 2024-04-05

## 2024-03-08 RX ADMIN — DIPHENHYDRAMINE HYDROCHLORIDE 25 MG: 50 INJECTION INTRAMUSCULAR; INTRAVENOUS at 09:03

## 2024-03-08 RX ADMIN — METHYLPREDNISOLONE SODIUM SUCCINATE 40 MG: 40 INJECTION, POWDER, FOR SOLUTION INTRAMUSCULAR; INTRAVENOUS at 09:03

## 2024-03-08 RX ADMIN — SODIUM CHLORIDE 350 MG: 9 INJECTION, SOLUTION INTRAVENOUS at 09:03

## 2024-03-08 RX ADMIN — SODIUM CHLORIDE: 9 INJECTION, SOLUTION INTRAVENOUS at 09:03

## 2024-03-08 RX ADMIN — ACETAMINOPHEN 500 MG: 500 TABLET ORAL at 09:03

## 2024-03-08 NOTE — NURSING
0920 Patient is here for Inflectra q 4wks. Voices no c/o.   1141 Infusion completed w/o incident.

## 2024-04-05 ENCOUNTER — INFUSION (OUTPATIENT)
Dept: INFUSION THERAPY | Facility: HOSPITAL | Age: 39
End: 2024-04-05
Attending: INTERNAL MEDICINE
Payer: MEDICAID

## 2024-04-05 VITALS
HEIGHT: 62 IN | SYSTOLIC BLOOD PRESSURE: 112 MMHG | BODY MASS INDEX: 30.71 KG/M2 | TEMPERATURE: 98 F | RESPIRATION RATE: 20 BRPM | DIASTOLIC BLOOD PRESSURE: 67 MMHG | OXYGEN SATURATION: 100 % | HEART RATE: 56 BPM | WEIGHT: 166.88 LBS

## 2024-04-05 DIAGNOSIS — K50.10 CROHN'S DISEASE OF COLON WITHOUT COMPLICATION: Primary | ICD-10-CM

## 2024-04-05 PROCEDURE — 63600175 PHARM REV CODE 636 W HCPCS: Mod: JW,JG | Performed by: NURSE PRACTITIONER

## 2024-04-05 PROCEDURE — 96413 CHEMO IV INFUSION 1 HR: CPT

## 2024-04-05 PROCEDURE — 25000003 PHARM REV CODE 250: Performed by: NURSE PRACTITIONER

## 2024-04-05 PROCEDURE — 96375 TX/PRO/DX INJ NEW DRUG ADDON: CPT

## 2024-04-05 PROCEDURE — 96415 CHEMO IV INFUSION ADDL HR: CPT

## 2024-04-05 RX ORDER — METHYLPREDNISOLONE SOD SUCC 125 MG
40 VIAL (EA) INJECTION
Status: CANCELLED
Start: 2024-05-03

## 2024-04-05 RX ORDER — METHYLPREDNISOLONE SOD SUCC 125 MG
40 VIAL (EA) INJECTION
Status: CANCELLED | OUTPATIENT
Start: 2024-05-03

## 2024-04-05 RX ORDER — ACETAMINOPHEN 500 MG
500 TABLET ORAL
Status: CANCELLED | OUTPATIENT
Start: 2024-05-03

## 2024-04-05 RX ORDER — ACETAMINOPHEN 500 MG
500 TABLET ORAL
Status: COMPLETED | OUTPATIENT
Start: 2024-04-05 | End: 2024-04-05

## 2024-04-05 RX ORDER — DIPHENHYDRAMINE HYDROCHLORIDE 50 MG/ML
25 INJECTION INTRAMUSCULAR; INTRAVENOUS
Status: COMPLETED | OUTPATIENT
Start: 2024-04-05 | End: 2024-04-05

## 2024-04-05 RX ORDER — DIPHENHYDRAMINE HYDROCHLORIDE 50 MG/ML
25 INJECTION INTRAMUSCULAR; INTRAVENOUS
Status: CANCELLED | OUTPATIENT
Start: 2024-05-03

## 2024-04-05 RX ORDER — EPINEPHRINE 1 MG/ML
0.3 INJECTION, SOLUTION, CONCENTRATE INTRAVENOUS
Status: CANCELLED | OUTPATIENT
Start: 2024-05-03

## 2024-04-05 RX ORDER — ACETAMINOPHEN 325 MG/1
650 TABLET ORAL
Status: CANCELLED | OUTPATIENT
Start: 2024-05-03

## 2024-04-05 RX ORDER — IPRATROPIUM BROMIDE AND ALBUTEROL SULFATE 2.5; .5 MG/3ML; MG/3ML
3 SOLUTION RESPIRATORY (INHALATION)
Status: CANCELLED | OUTPATIENT
Start: 2024-05-03

## 2024-04-05 RX ADMIN — DIPHENHYDRAMINE HYDROCHLORIDE 25 MG: 50 INJECTION INTRAMUSCULAR; INTRAVENOUS at 09:04

## 2024-04-05 RX ADMIN — SODIUM CHLORIDE: 9 INJECTION, SOLUTION INTRAVENOUS at 09:04

## 2024-04-05 RX ADMIN — SODIUM CHLORIDE 370 MG: 9 INJECTION, SOLUTION INTRAVENOUS at 10:04

## 2024-04-05 RX ADMIN — METHYLPREDNISOLONE SODIUM SUCCINATE 40 MG: 40 INJECTION, POWDER, FOR SOLUTION INTRAMUSCULAR; INTRAVENOUS at 09:04

## 2024-04-05 RX ADMIN — ACETAMINOPHEN 500 MG: 500 TABLET ORAL at 09:04

## 2024-05-03 ENCOUNTER — INFUSION (OUTPATIENT)
Dept: INFUSION THERAPY | Facility: HOSPITAL | Age: 39
End: 2024-05-03
Attending: INTERNAL MEDICINE
Payer: MEDICAID

## 2024-05-03 VITALS
OXYGEN SATURATION: 99 % | SYSTOLIC BLOOD PRESSURE: 102 MMHG | WEIGHT: 169.56 LBS | HEART RATE: 79 BPM | HEIGHT: 62 IN | TEMPERATURE: 98 F | RESPIRATION RATE: 20 BRPM | DIASTOLIC BLOOD PRESSURE: 60 MMHG | BODY MASS INDEX: 31.2 KG/M2

## 2024-05-03 DIAGNOSIS — K50.10 CROHN'S DISEASE OF COLON WITHOUT COMPLICATION: Primary | ICD-10-CM

## 2024-05-03 PROCEDURE — 96415 CHEMO IV INFUSION ADDL HR: CPT

## 2024-05-03 PROCEDURE — 63600175 PHARM REV CODE 636 W HCPCS: Performed by: NURSE PRACTITIONER

## 2024-05-03 PROCEDURE — 25000003 PHARM REV CODE 250: Performed by: NURSE PRACTITIONER

## 2024-05-03 PROCEDURE — 96413 CHEMO IV INFUSION 1 HR: CPT

## 2024-05-03 PROCEDURE — 96375 TX/PRO/DX INJ NEW DRUG ADDON: CPT

## 2024-05-03 RX ORDER — ACETAMINOPHEN 500 MG
500 TABLET ORAL
Status: CANCELLED | OUTPATIENT
Start: 2024-05-31

## 2024-05-03 RX ORDER — DIPHENHYDRAMINE HYDROCHLORIDE 50 MG/ML
25 INJECTION INTRAMUSCULAR; INTRAVENOUS
Status: CANCELLED | OUTPATIENT
Start: 2024-05-31

## 2024-05-03 RX ORDER — ACETAMINOPHEN 325 MG/1
650 TABLET ORAL
Status: ACTIVE | OUTPATIENT
Start: 2024-05-03 | End: 2024-05-03

## 2024-05-03 RX ORDER — METHYLPREDNISOLONE SOD SUCC 125 MG
40 VIAL (EA) INJECTION
Status: CANCELLED | OUTPATIENT
Start: 2024-05-31

## 2024-05-03 RX ORDER — ACETAMINOPHEN 325 MG/1
650 TABLET ORAL
Status: CANCELLED | OUTPATIENT
Start: 2024-05-31

## 2024-05-03 RX ORDER — DIPHENHYDRAMINE HYDROCHLORIDE 50 MG/ML
25 INJECTION INTRAMUSCULAR; INTRAVENOUS
Status: ACTIVE | OUTPATIENT
Start: 2024-05-03 | End: 2024-05-03

## 2024-05-03 RX ORDER — DIPHENHYDRAMINE HYDROCHLORIDE 50 MG/ML
25 INJECTION INTRAMUSCULAR; INTRAVENOUS
Status: COMPLETED | OUTPATIENT
Start: 2024-05-03 | End: 2024-05-03

## 2024-05-03 RX ORDER — IPRATROPIUM BROMIDE AND ALBUTEROL SULFATE 2.5; .5 MG/3ML; MG/3ML
3 SOLUTION RESPIRATORY (INHALATION)
Status: ACTIVE | OUTPATIENT
Start: 2024-05-03 | End: 2024-05-03

## 2024-05-03 RX ORDER — IPRATROPIUM BROMIDE AND ALBUTEROL SULFATE 2.5; .5 MG/3ML; MG/3ML
3 SOLUTION RESPIRATORY (INHALATION)
Status: CANCELLED | OUTPATIENT
Start: 2024-05-31

## 2024-05-03 RX ORDER — METHYLPREDNISOLONE SOD SUCC 125 MG
40 VIAL (EA) INJECTION
Status: COMPLETED | OUTPATIENT
Start: 2024-05-03 | End: 2024-05-03

## 2024-05-03 RX ORDER — METHYLPREDNISOLONE SOD SUCC 125 MG
40 VIAL (EA) INJECTION
Status: ACTIVE | OUTPATIENT
Start: 2024-05-03 | End: 2024-05-03

## 2024-05-03 RX ORDER — ACETAMINOPHEN 500 MG
500 TABLET ORAL
Status: COMPLETED | OUTPATIENT
Start: 2024-05-03 | End: 2024-05-03

## 2024-05-03 RX ORDER — EPINEPHRINE 1 MG/ML
0.3 INJECTION, SOLUTION, CONCENTRATE INTRAVENOUS
Status: ACTIVE | OUTPATIENT
Start: 2024-05-03 | End: 2024-05-03

## 2024-05-03 RX ORDER — METHYLPREDNISOLONE SOD SUCC 125 MG
40 VIAL (EA) INJECTION
Status: CANCELLED
Start: 2024-05-31

## 2024-05-03 RX ORDER — EPINEPHRINE 1 MG/ML
0.3 INJECTION, SOLUTION, CONCENTRATE INTRAVENOUS
Status: CANCELLED | OUTPATIENT
Start: 2024-05-31

## 2024-05-03 RX ADMIN — METHYLPREDNISOLONE SODIUM SUCCINATE 40 MG: 125 INJECTION, POWDER, FOR SOLUTION INTRAMUSCULAR; INTRAVENOUS at 09:05

## 2024-05-03 RX ADMIN — SODIUM CHLORIDE 380 MG: 9 INJECTION, SOLUTION INTRAVENOUS at 09:05

## 2024-05-03 RX ADMIN — SODIUM CHLORIDE: 9 INJECTION, SOLUTION INTRAVENOUS at 09:05

## 2024-05-03 RX ADMIN — ACETAMINOPHEN 500 MG: 500 TABLET ORAL at 09:05

## 2024-05-03 RX ADMIN — DIPHENHYDRAMINE HYDROCHLORIDE 25 MG: 50 INJECTION INTRAMUSCULAR; INTRAVENOUS at 09:05

## 2024-05-03 NOTE — NURSING
Infusion Note:  Pt has an appointment today for: Infusion    Treatment Regimen: Inflectra  #:  21  every Q4 weeks;      Given  Premeds given: Solumedrol, Benedryl, and Tylenol    PIV    Treatment parameters: N/A    Nursing note:  Pt denies fever, chills, recent surgery, and does not have any complaints. V/S Q30 mins.    Next infusion appointment is for: Infusion    fluid retention

## 2024-05-31 ENCOUNTER — INFUSION (OUTPATIENT)
Dept: INFUSION THERAPY | Facility: HOSPITAL | Age: 39
End: 2024-05-31
Attending: INTERNAL MEDICINE
Payer: MEDICAID

## 2024-05-31 VITALS
OXYGEN SATURATION: 98 % | HEART RATE: 69 BPM | RESPIRATION RATE: 18 BRPM | SYSTOLIC BLOOD PRESSURE: 110 MMHG | WEIGHT: 171.19 LBS | DIASTOLIC BLOOD PRESSURE: 73 MMHG | BODY MASS INDEX: 31.31 KG/M2

## 2024-05-31 DIAGNOSIS — K50.10 CROHN'S DISEASE OF COLON WITHOUT COMPLICATION: Primary | ICD-10-CM

## 2024-05-31 PROCEDURE — 96413 CHEMO IV INFUSION 1 HR: CPT

## 2024-05-31 PROCEDURE — 96415 CHEMO IV INFUSION ADDL HR: CPT

## 2024-05-31 PROCEDURE — 96375 TX/PRO/DX INJ NEW DRUG ADDON: CPT

## 2024-05-31 PROCEDURE — 63600175 PHARM REV CODE 636 W HCPCS: Performed by: NURSE PRACTITIONER

## 2024-05-31 PROCEDURE — 25000003 PHARM REV CODE 250: Performed by: NURSE PRACTITIONER

## 2024-05-31 RX ORDER — ACETAMINOPHEN 500 MG
500 TABLET ORAL
Status: COMPLETED | OUTPATIENT
Start: 2024-05-31 | End: 2024-05-31

## 2024-05-31 RX ORDER — ACETAMINOPHEN 325 MG/1
650 TABLET ORAL
OUTPATIENT
Start: 2024-06-28

## 2024-05-31 RX ORDER — DIPHENHYDRAMINE HYDROCHLORIDE 50 MG/ML
25 INJECTION INTRAMUSCULAR; INTRAVENOUS
Status: COMPLETED | OUTPATIENT
Start: 2024-05-31 | End: 2024-05-31

## 2024-05-31 RX ORDER — IPRATROPIUM BROMIDE AND ALBUTEROL SULFATE 2.5; .5 MG/3ML; MG/3ML
3 SOLUTION RESPIRATORY (INHALATION)
OUTPATIENT
Start: 2024-06-28

## 2024-05-31 RX ORDER — METHYLPREDNISOLONE SOD SUCC 125 MG
40 VIAL (EA) INJECTION
OUTPATIENT
Start: 2024-06-28

## 2024-05-31 RX ORDER — EPINEPHRINE 1 MG/ML
0.3 INJECTION, SOLUTION, CONCENTRATE INTRAVENOUS
OUTPATIENT
Start: 2024-06-28

## 2024-05-31 RX ORDER — METHYLPREDNISOLONE SOD SUCC 125 MG
40 VIAL (EA) INJECTION
Start: 2024-06-28

## 2024-05-31 RX ORDER — DIPHENHYDRAMINE HYDROCHLORIDE 50 MG/ML
25 INJECTION INTRAMUSCULAR; INTRAVENOUS
OUTPATIENT
Start: 2024-06-28

## 2024-05-31 RX ORDER — ACETAMINOPHEN 500 MG
500 TABLET ORAL
OUTPATIENT
Start: 2024-06-28

## 2024-05-31 RX ADMIN — SODIUM CHLORIDE 390 MG: 9 INJECTION, SOLUTION INTRAVENOUS at 11:05

## 2024-05-31 RX ADMIN — DIPHENHYDRAMINE HYDROCHLORIDE 25 MG: 50 INJECTION INTRAMUSCULAR; INTRAVENOUS at 10:05

## 2024-05-31 RX ADMIN — SODIUM CHLORIDE: 9 INJECTION, SOLUTION INTRAVENOUS at 10:05

## 2024-05-31 RX ADMIN — ACETAMINOPHEN 500 MG: 500 TABLET ORAL at 10:05

## 2024-05-31 RX ADMIN — METHYLPREDNISOLONE SODIUM SUCCINATE 40 MG: 40 INJECTION, POWDER, FOR SOLUTION INTRAMUSCULAR; INTRAVENOUS at 10:05

## 2024-05-31 NOTE — NURSING
Patient arrived ambulatory to Infusion alert and oriented with no acute complaints at this time.  Pt is here for Inflectra infusion, pt tolerated tx well.    Teetee Carmen RN

## 2024-06-27 ENCOUNTER — TELEPHONE (OUTPATIENT)
Dept: INFUSION THERAPY | Facility: HOSPITAL | Age: 39
End: 2024-06-27
Payer: MEDICAID

## 2024-06-28 ENCOUNTER — INFUSION (OUTPATIENT)
Dept: INFUSION THERAPY | Facility: HOSPITAL | Age: 39
End: 2024-06-28
Attending: INTERNAL MEDICINE
Payer: MEDICAID

## 2024-06-28 VITALS
DIASTOLIC BLOOD PRESSURE: 80 MMHG | SYSTOLIC BLOOD PRESSURE: 129 MMHG | RESPIRATION RATE: 18 BRPM | HEART RATE: 84 BPM | OXYGEN SATURATION: 96 % | TEMPERATURE: 98 F | WEIGHT: 170.63 LBS | HEIGHT: 62 IN | BODY MASS INDEX: 31.4 KG/M2

## 2024-06-28 DIAGNOSIS — K50.10 CROHN'S DISEASE OF COLON WITHOUT COMPLICATION: Primary | ICD-10-CM

## 2024-06-28 PROCEDURE — 96413 CHEMO IV INFUSION 1 HR: CPT

## 2024-06-28 PROCEDURE — 96415 CHEMO IV INFUSION ADDL HR: CPT

## 2024-06-28 PROCEDURE — 25000003 PHARM REV CODE 250: Performed by: INTERNAL MEDICINE

## 2024-06-28 PROCEDURE — 63600175 PHARM REV CODE 636 W HCPCS: Performed by: INTERNAL MEDICINE

## 2024-06-28 PROCEDURE — 96375 TX/PRO/DX INJ NEW DRUG ADDON: CPT

## 2024-06-28 RX ORDER — ACETAMINOPHEN 325 MG/1
650 TABLET ORAL
Status: ACTIVE | OUTPATIENT
Start: 2024-06-28 | End: 2024-06-28

## 2024-06-28 RX ORDER — ACETAMINOPHEN 500 MG
500 TABLET ORAL
Status: COMPLETED | OUTPATIENT
Start: 2024-06-28 | End: 2024-06-28

## 2024-06-28 RX ORDER — EPINEPHRINE 1 MG/ML
0.3 INJECTION, SOLUTION, CONCENTRATE INTRAVENOUS
Status: ACTIVE | OUTPATIENT
Start: 2024-06-28 | End: 2024-06-28

## 2024-06-28 RX ORDER — IPRATROPIUM BROMIDE AND ALBUTEROL SULFATE 2.5; .5 MG/3ML; MG/3ML
3 SOLUTION RESPIRATORY (INHALATION)
Status: ACTIVE | OUTPATIENT
Start: 2024-06-28 | End: 2024-06-28

## 2024-06-28 RX ORDER — DIPHENHYDRAMINE HYDROCHLORIDE 50 MG/ML
25 INJECTION INTRAMUSCULAR; INTRAVENOUS
OUTPATIENT
Start: 2024-07-26

## 2024-06-28 RX ORDER — DIPHENHYDRAMINE HYDROCHLORIDE 50 MG/ML
25 INJECTION INTRAMUSCULAR; INTRAVENOUS
Status: ACTIVE | OUTPATIENT
Start: 2024-06-28 | End: 2024-06-28

## 2024-06-28 RX ORDER — EPINEPHRINE 1 MG/ML
0.3 INJECTION, SOLUTION, CONCENTRATE INTRAVENOUS
OUTPATIENT
Start: 2024-07-26

## 2024-06-28 RX ORDER — METHYLPREDNISOLONE SOD SUCC 125 MG
40 VIAL (EA) INJECTION
Status: ACTIVE | OUTPATIENT
Start: 2024-06-28 | End: 2024-06-28

## 2024-06-28 RX ORDER — DIPHENHYDRAMINE HYDROCHLORIDE 50 MG/ML
25 INJECTION INTRAMUSCULAR; INTRAVENOUS
Status: COMPLETED | OUTPATIENT
Start: 2024-06-28 | End: 2024-06-28

## 2024-06-28 RX ORDER — ACETAMINOPHEN 500 MG
500 TABLET ORAL
OUTPATIENT
Start: 2024-07-26

## 2024-06-28 RX ORDER — METHYLPREDNISOLONE SOD SUCC 125 MG
40 VIAL (EA) INJECTION
OUTPATIENT
Start: 2024-07-26

## 2024-06-28 RX ORDER — ACETAMINOPHEN 325 MG/1
650 TABLET ORAL
OUTPATIENT
Start: 2024-07-26

## 2024-06-28 RX ORDER — IPRATROPIUM BROMIDE AND ALBUTEROL SULFATE 2.5; .5 MG/3ML; MG/3ML
3 SOLUTION RESPIRATORY (INHALATION)
OUTPATIENT
Start: 2024-07-26

## 2024-06-28 RX ORDER — METHYLPREDNISOLONE SOD SUCC 125 MG
40 VIAL (EA) INJECTION
Start: 2024-07-26

## 2024-06-28 RX ADMIN — SODIUM CHLORIDE 390 MG: 9 INJECTION, SOLUTION INTRAVENOUS at 09:06

## 2024-06-28 RX ADMIN — DIPHENHYDRAMINE HYDROCHLORIDE 25 MG: 50 INJECTION INTRAMUSCULAR; INTRAVENOUS at 09:06

## 2024-06-28 RX ADMIN — SODIUM CHLORIDE: 9 INJECTION, SOLUTION INTRAVENOUS at 09:06

## 2024-06-28 RX ADMIN — ACETAMINOPHEN 500 MG: 500 TABLET ORAL at 09:06

## 2024-06-28 RX ADMIN — METHYLPREDNISOLONE SODIUM SUCCINATE 40 MG: 40 INJECTION, POWDER, FOR SOLUTION INTRAMUSCULAR; INTRAVENOUS at 09:06

## 2024-06-28 NOTE — NURSING
Pt ambulated to Infusion Clinic unaccompanied and unassisted for q4wk Inflectra infusion; pt without chief complaint; peripheral IV started, premeds given and Inflectra infusion initiated at 0947.    AVS given and pt verbalized understanding next Inflectra infusion on 7/26/24.

## 2024-07-19 NOTE — PROGRESS NOTES
Inflammatory Bowel Disease        Established Patient Note         TODAY'S VISIT DATE:  7/22/2024  The patient's last visit with GI was on 1/9/2024.     PCP: Mary Carmen Ocampo      Referring MD:   No ref. provider found    History of Present Illness:  Shivani is a 39 year old with Crohn's colitis on infliximab 5 mg/kg every 4 weeks monotherapy here for follow-up.    She was diagnosed with Crohn's colitis by Dr. Ann in January 2015 when she began experiencing diarrhea, abdominal pain, and weight loss. When she was diagnosed she was initially started on a steroid taper, balsalazide and when she did not improve, Humira was started. Fecal calprotectin in march 2018: 445. Adalimumab trough in May 2018 was 1.8, Ab<25, so azathioprine was added. She did not tolerate azathioprine per notes. Due to continued symptoms and low trough level, her Humira dose was increased to 40mg weekly in 2018. She was kept on balsalazide as well.     She did have a rectal abscess in the past, referred to surgery by Dr. Ann, and underwent I&D. No notes to review regarding this and findings.    Adalimumab level in October 2019 was 13, Ab<25. Unsure if this was trough level.     September 2020: Adalimumab trough: 5.7, Ab <25  September 2020: fecal calprotectin 936    When seen initially by me in September 2020, she was doing well clinically. She reported compliance with her Humira weekly. She was no longer taking balsalazide because she felt that it was unhelpful. She reported baseline 1-2 bowel movements a day with occasional hard stools with bloody streaks. She did report occasional breakthrough episodes of increased diarrhea and abdominal pain. She had never had a repeat colonoscopy since starting on Humira. I recommended a colonoscopy at that time. Despite several scheduled colonoscopies, she canceled each appointment and did not follow up till April 2022    She was found to be pregnant in November  2021. She had a miscarriage during this pregnancy.     In April 2022 on follow-up she was having diarrhea, abdominal pain, weight loss. She had not been on Humira due to prescription lapse for 3 months.  She was taking NSAIDs and was given prednisone 5mg by PCP which was not helpful. She denied heartburn or reflux.     April 2022 Colonoscopy: multiple scattered medium to large ulcerations throughout the colon.      She was started on infliximab (inflectra) and azathioprine in May 2022.  She has completed loading doses of infliximab and was on 5 mg/kg every 8 weeks.  The azathioprine made her have nausea, vomiting, diarrhea so she stopped taking it and symptoms resolved.  She completed a prednisone taper as well.     August 2022: IFX trough 3.1, Ab < 20      August 2022: Fecal calprotectin: 751     In August 2022 on follow-up she was pregnant.  Clinically improved on infusions. She remained on 5 mg/kg every 8 weeks  Delivered healthy baby boy via vaginal delivery in January 2023.  She was followed by M for the pregnancy.      February 2023 Fecal calprotectin: 155  February IFX trough 8.5, Ab < 20     Diarrhea in July 2023. Campylobacter positive- treated.  Fecal calprotectin 2894.  Given steroid taper and recheck of trough.   July 2023: IFX tough 1.9, Ab < 20  Changed to 5 mg/kg every 4 weeks  October 2023: IFX trough 13, Ab < 20     January 2024 Fecal calprotectin: 77.3    Today, patient reports feeling well. On average, she has a bowel movement every day to every other day. Stools are formed and brown in color. She does report constipation with BRBPR.  She states that she notices bright red blood on the paper when she wipes and mixed into her stool.  She denies abdominal pain, diarrhea, urgency, loss of appetite, and weight loss. She had one stool at 3 AM last week which is unusual for her. She feels that she is having more issues with constipation lately, she is having to strain to have a bowel movement and  stools are hard balls. She is currently taking Amitza once daily although it is prescribed twice daily.     Denies regular NSAID. She denies tobacco, alcohol and drug use.   Mother has colon cancer, 61 years old at the time of diagnosis. No family history of IBD.      IBD History:  IBD disease: Crohn's disease  Disease location: Colon     Current IBD Therapy:  Inflectra 5mg/kg every 4 weeks (May 2022 - present - changed to 4 weeks in July 2023)     Therapeutic Drug Monitoring Labs:  July 2023: IFX tough 1.9, Ab < 20  Changed to 5 mg/kg every 4 weeks  October 2023: IFX trough 13, Ab < 20     Prior IBD Therapies:  Balsalazide  Humira 40mg weekly (2018- Jan 2022)  Prednisone  Azathioprine        Pertinent Endoscopy/Imaging:  January 7, 2015: Colonoscopy (Dr. Ann): Erythematous mucosa and cratered ulcerations in the ascending and transverse colon. Erythematous mucosa and white plaques in the sigmoid and descending colon. No comment on TI. Pathology: Focal colitis, moderate activity, c/w IBD     April 29, 2022: Colonoscopy: skin tags, normal TI, Med to large patches of deep ulcerations surrounded by normal mucosa in the rectum, sigmoid, descending, transverse, and ascending.  Path: severe active chronic colitis with cryptitis, crypt abscess, dropout.    January 24, 2024: Colonoscopy: skin tags, normal TI, 4 mm ulcer at the ileocecal valve. Transverse Colon: large linear superficial ulcers 0.5-2 cm in size, less than 10% ulcerated surfaces, less than 50% of surfaces affected and no narrowings. Left Colon:aphthous ulcers less than 0.5 cm in size, less than 10% ulcerated surfaces, less than 50% of surfaces affected and no narrowings.  Total SES-CD aggregate score: 7. Biopsies were taken. Path: Chronic, inactive colitis.         Prior Pertinent Surgeries:   none     Complications:   none     Extraintestinal Manifestations:  None       Review of Systems   Constitutional:  Negative for appetite change and unexpected weight  change.   HENT:  Negative for trouble swallowing.    Respiratory:  Negative for chest tightness.    Cardiovascular: Negative.    Gastrointestinal:  Positive for blood in stool and constipation. Negative for abdominal pain, change in bowel habit, diarrhea, nausea, rectal pain, vomiting, reflux and fecal incontinence.   Musculoskeletal: Negative.    Neurological: Negative.    Psychiatric/Behavioral: Negative.     All other systems reviewed and are negative.         Medical/Surgical History:   Past Medical History:   Diagnosis Date    Crohn's colitis     Mild intermittent asthma, uncomplicated      Past Surgical History:   Procedure Laterality Date    COLONOSCOPY      COLONOSCOPY, WITH 1 OR MORE BIOPSIES N/A 1/24/2024    Procedure: COLONOSCOPY, WITH 1 OR MORE BIOPSIES;  Surgeon: Mary Blanton MD;  Location: [facility] ENDOSCOPY;  Service: Gastroenterology;  Laterality: N/A;    FISTULA REPAIR      TONSILLECTOMY AND ADENOIDECTOMY           Family History:   Family History   Problem Relation Name Age of Onset    Colon cancer Mother      Diabetes Father      Diabetes Maternal Grandmother      Alzheimer's disease Paternal Grandmother          Social History:   Social History     Socioeconomic History    Marital status: Single   Tobacco Use    Smoking status: Never    Smokeless tobacco: Never   Substance and Sexual Activity    Alcohol use: Not Currently    Drug use: Never    Sexual activity: Yes     Partners: Male     Birth control/protection: OCP     Comment: Currently pregnant     Social Determinants of Health     Financial Resource Strain: Low Risk  (8/30/2022)    [facility] from [facility]ates, [facility]    Overall Financial Resource Strain (CARDIA)     Difficulty of Paying Living Expenses: Not hard at all   Food Insecurity: No Food Insecurity (8/30/2022)    [facility], [facility] and Affiliates    Hunger Vital Sign     Worried About Running Out of Food in the Last Year: Never true     Ran Out of Food in the Last Year: Never true   Transportation Needs: No Transportation Needs (8/30/2022)    Received from [facility] and Its [facility] and Affiliates, [facility] and Affiliates    PRAPARE - Transportation     Lack of Transportation (Medical): No     Lack of Transportation (Non-Medical): No   Stress: No Stress Concern Present (1/19/2023)    Received from [facility] and Its [facility] and [facility]iliates, [facility] and Aff[facility]ates    [facility] of Occupational [facility] - Occupational Stress Questionnaire     Feeling of Stress : Not at all   Housing Stability: Unknown (8/30/2022)    [facility], [facility]    Housing Stability Vital Sign     Unable to Pay for Housing in the Last Year: No     Number of Places Lived in the Last Year: 1        Review of patient's allergies indicates:  No Known Allergies    Current Medications:   Outpatient Medications Marked as Taking for the 7/22/24 encounter (Office Visit) with Kelsy Wren FNP   Medication Sig Dispense Refill    albuterol-ipratropium (DUO-NEB) 2.5 mg-0.5 mg/3 mL nebulizer solution USE 1 VIAL VIA NEBULIZER EVERY 6 HOURS AS NEEDED FOR WHEEZING      lubiprostone (AMITIZA) 8 MCG Cap Take 1 capsule (8 mcg total) by mouth 2 (two) times daily. 60 capsule 6    nebulizer and compressor Manju Comp-Air XLT Compressor for Nebulizer   AS  "DIRECTED      SYMBICORT 160-4.5 mcg/actuation HFAA Inhale 2 puffs into the lungs every 12 (twelve) hours.          Vital Signs:  /69 (BP Location: Left arm, Patient Position: Sitting, BP Method: Medium (Automatic))   Pulse 80   Temp 98.1 °F (36.7 °C) (Oral)   Resp 12   Ht 5' 2" (1.575 m)   Wt 78.9 kg (174 lb)   LMP 07/01/2024 (Exact Date)   BMI 31.83 kg/m²      Physical Exam  Vitals and nursing note reviewed.   Constitutional:       Appearance: Normal appearance.   HENT:      Head: Normocephalic and atraumatic.      Mouth/Throat:      Mouth: Mucous membranes are moist.   Eyes:      Conjunctiva/sclera: Conjunctivae normal.   Cardiovascular:      Rate and Rhythm: Normal rate and regular rhythm.      Pulses: Normal pulses.      Heart sounds: No murmur heard.     No friction rub. No gallop.   Pulmonary:      Effort: Pulmonary effort is normal.      Breath sounds: Normal breath sounds.   Abdominal:      General: Bowel sounds are normal. There is no distension.      Palpations: Abdomen is soft.      Tenderness: There is no abdominal tenderness. There is no rebound.   Musculoskeletal:         General: Normal range of motion.      Cervical back: Normal range of motion.   Skin:     General: Skin is warm.   Neurological:      General: No focal deficit present.      Mental Status: She is alert. Mental status is at baseline.   Psychiatric:         Mood and Affect: Mood normal.         Behavior: Behavior normal.         Labs: Reviewed  Hgb   Date Value Ref Range Status   07/26/2024 12.6 12.0 - 16.0 g/dL Final     Albumin   Date Value Ref Range Status   07/26/2024 3.9 3.5 - 5.0 g/dL Final     Iron Level   Date Value Ref Range Status   07/26/2024 67 50 - 170 ug/dL Final     Ferritin Level   Date Value Ref Range Status   07/26/2024 33.75 4.63 - 204.00 ng/mL Final     Folate Level   Date Value Ref Range Status   07/26/2024 10.9 7.0 - 31.4 ng/mL Final     Vitamin B12   Date Value Ref Range Status   07/26/2024 817 (H) 213 " - 816 pg/mL Final     Vitamin D   Date Value Ref Range Status   07/26/2024 25 (L) 30 - 80 ng/mL Final     Zinc   Date Value Ref Range Status   01/10/2023 49 (L) 60 - 106 mcg/dL Final     Comment:        -------------------ADDITIONAL INFORMATION-------------------  This test was developed and its performance characteristics   determined by [facility] in a manner consistent with CLIA   requirements. This test has not been cleared or approved by   the U.S. Food and Drug Administration.     Test Performed by:  [facility] [place] [address]  : Neal Bae M.D. Ph.D.; CLIA# [id number]     CRP   Date Value Ref Range Status   07/26/2024 1.40 <5.00 mg/L Final     Calprotectin, F   Date Value Ref Range Status   01/18/2024 77.3 (H) <50.0 (Normal) mcg/g Final     Comment:     Interpretation: Borderline (50.0-120 mcg/g)     Test Performed by:  [facility] [place] [address]  : Neal Bae M.D. Ph.D.; CLIA# [id number]      Quantiferon gold: negative February 2023, negative January 2024.   Fecal calprotectin: March 2018: 445 --> Sept 2020: 936--> August 2022: 751 -->February 2023: 155 -->July 2023: 2894 --> January 2024: 77.3.        Assessment/Plan:    Problem List Items Addressed This Visit          Endocrine    Vitamin D deficiency     Vitamin D level 25.   Start ergocalciferol 50,000 IU once weekly x 12 weeks then OTC vitamin D supplement once daily.          Relevant Medications    ergocalciferol (ERGOCALCIFEROL) 50,000 unit Cap       GI    Crohn's disease of colon without complication - Primary     Crohn's colitis based on initial colonoscopy in 2015.   Previously on Humira 40mg weekly, clinically seemed to improve but no endoscopic evaluation to assess for mucosal healing  Adalimumab trough 5.7, Ab < 25 when on Humira 40mg weekly back in Sept 2020  Stopped  Humira early 2022 April 2022 Colonoscopy: active Crohn's colitis with scattered ulcerations  Inflectra 5mg/kg started May 2022.  Could not tolerate azathioprine  Clinical improvement on infliximab  Week 14 trough 3.1, Ab < 20 (while pregnant)  August 2022: fecal calprotectin 751  January 2023: calprotectin 155, CRP 2.9  Postpartum: Delivered son on 1/18/2023 February 2023 Fecal calprotectin: 155  February IFX trough 8.5, Ab < 20     Diarrhea in July 2023. Campylobacter positive- treated.  Fecal calprotectin 2894.  Given steroid taper and recheck of trough.   July 2023: IFX tough 1.9, Ab < 20  Changed to 5 mg/kg every 4 weeks  October 2023: IFX trough 13, Ab < 20  Therapeutic dosing now on 5 mg/kg every 4 weeks monotherapy  Clinically improved  January 2024 Fecal calprotectin: 77.3  January 2024: Colonoscopy: 4 mm ulcer at IC  valve, transverse colon: large superficial ulcers, left colon: aphthous ulcers. SES-CD score 7. Path: Inactive colitis.     Continue current therapy.  Recheck fecal calprotectin and labs- labs will be drawn on 7/26/24 with infusion appointment.          Relevant Orders    CBC Auto Differential (Completed)    Comprehensive Metabolic Panel (Completed)    C-Reactive Protein (Completed)    Calprotectin, Stool    Iron and TIBC (Completed)    Ferritin (Completed)    Folate (Completed)    Vitamin B12 (Completed)    Vitamin D (Completed)    Zinc    Hepatitis A antibody, IgG (Completed)    Hepatitis B Surface Ab, Qualitative (Completed)    Chronic idiopathic constipation     -Only taking Amitiza once daily. Advised to start taking Amitiza twice daily as prescribed.   -Start fiber supplement daily.           Other Visit Diagnoses       Immunization due        Recommended: Tdap, PCV20, Shingrix. Patient declined.        HEALTH MAINTENANCE:     Vaccinations:  Influenza (inactive):  recommended annually   PCV 20 (Prevnar): recommended, declined on 7/22/24  Tetanus (TdaP): September 2010, recommended every  10 years, declined  HPV (males and females ages 11-44 yo): N/A  Meningococcal: No risk factors   Hepatitis B: Twinrix  7/27/23, Twinrix 8/28/23, Twinrix 1/9/24, immune 7/26/24.   Hepatitis A: Twinrix  7/27/23, Twinrix 8/28/23, Twinrix 1/9/24,  immune 7/26/24.   MMR (live vaccine): UTD          Chickenpox status/Varicella (live vaccine): immune, +Ab  Shingrix: recommended, declined on 7/22/24  COVID-19: recommend    Colorectal cancer risk:  Risk factors: > 8 years of disease, > 1/3 colon involved  - Distribution of colonic disease: > 1/3 of colon  - Year of symptom onset: 2015  - Colonoscopy every 2-3 years after endoscopic remission    Ophthalmologic exam recommended yearly  Dermatologic exam recommended yearly due to risk of skin cancer with IBD/meds    Bone health:  Calcium 9635-7316 mg daily and vitamin D 800 IU daily  Risk factors for osteopenia/osteoporosis: steroid use  Vitamin D: 25  Ergocalciferol 50,000 IU weekly x 12 weeks if low  DEXA scan: recommend    Females:  Gynecological exam yearly.    Smoking status: nonsmoker    Follow up: 6 months

## 2024-07-22 ENCOUNTER — OFFICE VISIT (OUTPATIENT)
Dept: GASTROENTEROLOGY | Facility: CLINIC | Age: 39
End: 2024-07-22
Payer: MEDICAID

## 2024-07-22 VITALS
HEART RATE: 80 BPM | RESPIRATION RATE: 12 BRPM | WEIGHT: 174 LBS | HEIGHT: 62 IN | DIASTOLIC BLOOD PRESSURE: 69 MMHG | TEMPERATURE: 98 F | BODY MASS INDEX: 32.02 KG/M2 | SYSTOLIC BLOOD PRESSURE: 105 MMHG

## 2024-07-22 DIAGNOSIS — E55.9 VITAMIN D DEFICIENCY: ICD-10-CM

## 2024-07-22 DIAGNOSIS — K50.10 CROHN'S DISEASE OF COLON WITHOUT COMPLICATION: Primary | ICD-10-CM

## 2024-07-22 DIAGNOSIS — Z23 IMMUNIZATION DUE: ICD-10-CM

## 2024-07-22 DIAGNOSIS — K59.04 CHRONIC IDIOPATHIC CONSTIPATION: ICD-10-CM

## 2024-07-22 PROCEDURE — 3078F DIAST BP <80 MM HG: CPT | Mod: CPTII,,, | Performed by: NURSE PRACTITIONER

## 2024-07-22 PROCEDURE — 99214 OFFICE O/P EST MOD 30 MIN: CPT | Mod: S$PBB,,, | Performed by: NURSE PRACTITIONER

## 2024-07-22 PROCEDURE — 99214 OFFICE O/P EST MOD 30 MIN: CPT | Mod: PBBFAC | Performed by: NURSE PRACTITIONER

## 2024-07-22 PROCEDURE — 3008F BODY MASS INDEX DOCD: CPT | Mod: CPTII,,, | Performed by: NURSE PRACTITIONER

## 2024-07-22 PROCEDURE — 3074F SYST BP LT 130 MM HG: CPT | Mod: CPTII,,, | Performed by: NURSE PRACTITIONER

## 2024-07-22 NOTE — ASSESSMENT & PLAN NOTE
Crohn's colitis based on initial colonoscopy in 2015.   Previously on Humira 40mg weekly, clinically seemed to improve but no endoscopic evaluation to assess for mucosal healing  Adalimumab trough 5.7, Ab < 25 when on Humira 40mg weekly back in Sept 2020  Stopped Humira early 2022 April 2022 Colonoscopy: active Crohn's colitis with scattered ulcerations  Inflectra 5mg/kg started May 2022.  Could not tolerate azathioprine  Clinical improvement on infliximab  Week 14 trough 3.1, Ab < 20 (while pregnant)  August 2022: fecal calprotectin 751  January 2023: calprotectin 155, CRP 2.9  Postpartum: Delivered son on 1/18/2023 February 2023 Fecal calprotectin: 155  February IFX trough 8.5, Ab < 20     Diarrhea in July 2023. Campylobacter positive- treated.  Fecal calprotectin 2894.  Given steroid taper and recheck of trough.   July 2023: IFX tough 1.9, Ab < 20  Changed to 5 mg/kg every 4 weeks  October 2023: IFX trough 13, Ab < 20  Therapeutic dosing now on 5 mg/kg every 4 weeks monotherapy  Clinically improved  January 2024 Fecal calprotectin: 77.3  January 2024: Colonoscopy: 4 mm ulcer at IC  valve, transverse colon: large superficial ulcers, left colon: aphthous ulcers. SES-CD score 7. Path: Inactive colitis.     Continue current therapy.  Recheck fecal calprotectin and labs- labs will be drawn on 7/26/24 with infusion appointment.

## 2024-07-22 NOTE — ASSESSMENT & PLAN NOTE
-Only taking Amitiza once daily. Advised to start taking Amitiza twice daily as prescribed.   -Start fiber supplement daily.

## 2024-07-26 ENCOUNTER — LAB VISIT (OUTPATIENT)
Dept: HEMATOLOGY/ONCOLOGY | Facility: CLINIC | Age: 39
End: 2024-07-26
Payer: MEDICAID

## 2024-07-26 ENCOUNTER — INFUSION (OUTPATIENT)
Dept: INFUSION THERAPY | Facility: HOSPITAL | Age: 39
End: 2024-07-26
Attending: INTERNAL MEDICINE
Payer: MEDICAID

## 2024-07-26 VITALS
OXYGEN SATURATION: 96 % | BODY MASS INDEX: 32.14 KG/M2 | TEMPERATURE: 98 F | HEIGHT: 62 IN | SYSTOLIC BLOOD PRESSURE: 115 MMHG | WEIGHT: 174.63 LBS | HEART RATE: 70 BPM | RESPIRATION RATE: 16 BRPM | DIASTOLIC BLOOD PRESSURE: 76 MMHG

## 2024-07-26 DIAGNOSIS — K50.10 CROHN'S DISEASE OF COLON WITHOUT COMPLICATION: ICD-10-CM

## 2024-07-26 DIAGNOSIS — K50.10 CROHN'S DISEASE OF COLON WITHOUT COMPLICATION: Primary | ICD-10-CM

## 2024-07-26 PROBLEM — E55.9 VITAMIN D DEFICIENCY: Status: ACTIVE | Noted: 2024-07-26

## 2024-07-26 LAB
25(OH)D3+25(OH)D2 SERPL-MCNC: 25 NG/ML (ref 30–80)
ALBUMIN SERPL-MCNC: 3.9 G/DL (ref 3.5–5)
ALBUMIN/GLOB SERPL: 0.9 RATIO (ref 1.1–2)
ALP SERPL-CCNC: 47 UNIT/L (ref 40–150)
ALT SERPL-CCNC: 14 UNIT/L (ref 0–55)
ANION GAP SERPL CALC-SCNC: 10 MEQ/L
AST SERPL-CCNC: 18 UNIT/L (ref 5–34)
BASOPHILS # BLD AUTO: 0.08 X10(3)/MCL
BASOPHILS NFR BLD AUTO: 1.3 %
BILIRUB SERPL-MCNC: 0.5 MG/DL
BUN SERPL-MCNC: 14.9 MG/DL (ref 7–18.7)
CALCIUM SERPL-MCNC: 9.7 MG/DL (ref 8.4–10.2)
CHLORIDE SERPL-SCNC: 109 MMOL/L (ref 98–107)
CO2 SERPL-SCNC: 20 MMOL/L (ref 22–29)
CREAT SERPL-MCNC: 0.81 MG/DL (ref 0.55–1.02)
CREAT/UREA NIT SERPL: 18
CRP SERPL-MCNC: 1.4 MG/L
EOSINOPHIL # BLD AUTO: 0.81 X10(3)/MCL (ref 0–0.9)
EOSINOPHIL NFR BLD AUTO: 13.1 %
ERYTHROCYTE [DISTWIDTH] IN BLOOD BY AUTOMATED COUNT: 12.9 % (ref 11.5–17)
FERRITIN SERPL-MCNC: 33.75 NG/ML (ref 4.63–204)
FOLATE SERPL-MCNC: 10.9 NG/ML (ref 7–31.4)
GFR SERPLBLD CREATININE-BSD FMLA CKD-EPI: >60 ML/MIN/1.73/M2
GLOBULIN SER-MCNC: 4.2 GM/DL (ref 2.4–3.5)
GLUCOSE SERPL-MCNC: 93 MG/DL (ref 74–100)
HAV AB SER QL IA: REACTIVE
HBV SURFACE AB SER-ACNC: 44.73 MIU/ML
HBV SURFACE AB SERPL IA-ACNC: REACTIVE M[IU]/ML
HCT VFR BLD AUTO: 37.8 % (ref 37–47)
HGB BLD-MCNC: 12.6 G/DL (ref 12–16)
IMM GRANULOCYTES # BLD AUTO: 0.01 X10(3)/MCL (ref 0–0.04)
IMM GRANULOCYTES NFR BLD AUTO: 0.2 %
IRON SATN MFR SERPL: 20 % (ref 20–50)
IRON SERPL-MCNC: 67 UG/DL (ref 50–170)
LYMPHOCYTES # BLD AUTO: 2.31 X10(3)/MCL (ref 0.6–4.6)
LYMPHOCYTES NFR BLD AUTO: 37.4 %
MCH RBC QN AUTO: 30.6 PG (ref 27–31)
MCHC RBC AUTO-ENTMCNC: 33.3 G/DL (ref 33–36)
MCV RBC AUTO: 91.7 FL (ref 80–94)
MONOCYTES # BLD AUTO: 0.43 X10(3)/MCL (ref 0.1–1.3)
MONOCYTES NFR BLD AUTO: 7 %
NEUTROPHILS # BLD AUTO: 2.54 X10(3)/MCL (ref 2.1–9.2)
NEUTROPHILS NFR BLD AUTO: 41 %
NRBC BLD AUTO-RTO: 0 %
PLATELET # BLD AUTO: 162 X10(3)/MCL (ref 130–400)
PLATELETS.RETICULATED NFR BLD AUTO: 10.1 % (ref 0.9–11.2)
PMV BLD AUTO: 11.4 FL (ref 7.4–10.4)
POTASSIUM SERPL-SCNC: 4.1 MMOL/L (ref 3.5–5.1)
PROT SERPL-MCNC: 8.1 GM/DL (ref 6.4–8.3)
RBC # BLD AUTO: 4.12 X10(6)/MCL (ref 4.2–5.4)
SODIUM SERPL-SCNC: 139 MMOL/L (ref 136–145)
TIBC SERPL-MCNC: 265 UG/DL (ref 70–310)
TIBC SERPL-MCNC: 332 UG/DL (ref 250–450)
TRANSFERRIN SERPL-MCNC: 307 MG/DL (ref 180–382)
VIT B12 SERPL-MCNC: 817 PG/ML (ref 213–816)
WBC # BLD AUTO: 6.18 X10(3)/MCL (ref 4.5–11.5)

## 2024-07-26 PROCEDURE — 83540 ASSAY OF IRON: CPT

## 2024-07-26 PROCEDURE — 82728 ASSAY OF FERRITIN: CPT

## 2024-07-26 PROCEDURE — 83550 IRON BINDING TEST: CPT

## 2024-07-26 PROCEDURE — 96415 CHEMO IV INFUSION ADDL HR: CPT

## 2024-07-26 PROCEDURE — 96375 TX/PRO/DX INJ NEW DRUG ADDON: CPT

## 2024-07-26 PROCEDURE — 82306 VITAMIN D 25 HYDROXY: CPT

## 2024-07-26 PROCEDURE — 86706 HEP B SURFACE ANTIBODY: CPT

## 2024-07-26 PROCEDURE — 85025 COMPLETE CBC W/AUTO DIFF WBC: CPT

## 2024-07-26 PROCEDURE — 86708 HEPATITIS A ANTIBODY: CPT

## 2024-07-26 PROCEDURE — 86140 C-REACTIVE PROTEIN: CPT

## 2024-07-26 PROCEDURE — 84630 ASSAY OF ZINC: CPT

## 2024-07-26 PROCEDURE — 82607 VITAMIN B-12: CPT

## 2024-07-26 PROCEDURE — 25000003 PHARM REV CODE 250: Performed by: INTERNAL MEDICINE

## 2024-07-26 PROCEDURE — 63600175 PHARM REV CODE 636 W HCPCS: Performed by: INTERNAL MEDICINE

## 2024-07-26 PROCEDURE — 96413 CHEMO IV INFUSION 1 HR: CPT

## 2024-07-26 PROCEDURE — 36415 COLL VENOUS BLD VENIPUNCTURE: CPT

## 2024-07-26 PROCEDURE — 80053 COMPREHEN METABOLIC PANEL: CPT

## 2024-07-26 PROCEDURE — 82746 ASSAY OF FOLIC ACID SERUM: CPT

## 2024-07-26 RX ORDER — EPINEPHRINE 1 MG/ML
0.3 INJECTION, SOLUTION, CONCENTRATE INTRAVENOUS
Status: ACTIVE | OUTPATIENT
Start: 2024-07-26 | End: 2024-07-26

## 2024-07-26 RX ORDER — ACETAMINOPHEN 325 MG/1
650 TABLET ORAL
OUTPATIENT
Start: 2024-08-23

## 2024-07-26 RX ORDER — DIPHENHYDRAMINE HYDROCHLORIDE 50 MG/ML
25 INJECTION INTRAMUSCULAR; INTRAVENOUS
OUTPATIENT
Start: 2024-08-23

## 2024-07-26 RX ORDER — METHYLPREDNISOLONE SOD SUCC 125 MG
40 VIAL (EA) INJECTION
Start: 2024-08-23

## 2024-07-26 RX ORDER — ACETAMINOPHEN 500 MG
500 TABLET ORAL
OUTPATIENT
Start: 2024-08-23

## 2024-07-26 RX ORDER — ERGOCALCIFEROL 1.25 MG/1
50000 CAPSULE ORAL
Qty: 12 CAPSULE | Refills: 0 | Status: SHIPPED | OUTPATIENT
Start: 2024-07-26

## 2024-07-26 RX ORDER — EPINEPHRINE 1 MG/ML
0.3 INJECTION, SOLUTION, CONCENTRATE INTRAVENOUS
OUTPATIENT
Start: 2024-08-23

## 2024-07-26 RX ORDER — ACETAMINOPHEN 500 MG
500 TABLET ORAL
Status: COMPLETED | OUTPATIENT
Start: 2024-07-26 | End: 2024-07-26

## 2024-07-26 RX ORDER — ACETAMINOPHEN 325 MG/1
650 TABLET ORAL
Status: ACTIVE | OUTPATIENT
Start: 2024-07-26 | End: 2024-07-26

## 2024-07-26 RX ORDER — IPRATROPIUM BROMIDE AND ALBUTEROL SULFATE 2.5; .5 MG/3ML; MG/3ML
3 SOLUTION RESPIRATORY (INHALATION)
Status: ACTIVE | OUTPATIENT
Start: 2024-07-26 | End: 2024-07-26

## 2024-07-26 RX ORDER — IPRATROPIUM BROMIDE AND ALBUTEROL SULFATE 2.5; .5 MG/3ML; MG/3ML
3 SOLUTION RESPIRATORY (INHALATION)
OUTPATIENT
Start: 2024-08-23

## 2024-07-26 RX ORDER — DIPHENHYDRAMINE HYDROCHLORIDE 50 MG/ML
25 INJECTION INTRAMUSCULAR; INTRAVENOUS
Status: ACTIVE | OUTPATIENT
Start: 2024-07-26 | End: 2024-07-26

## 2024-07-26 RX ORDER — DIPHENHYDRAMINE HYDROCHLORIDE 50 MG/ML
25 INJECTION INTRAMUSCULAR; INTRAVENOUS
Status: COMPLETED | OUTPATIENT
Start: 2024-07-26 | End: 2024-07-26

## 2024-07-26 RX ORDER — METHYLPREDNISOLONE SOD SUCC 125 MG
40 VIAL (EA) INJECTION
OUTPATIENT
Start: 2024-08-23

## 2024-07-26 RX ADMIN — ACETAMINOPHEN 500 MG: 500 TABLET, FILM COATED ORAL at 01:07

## 2024-07-26 RX ADMIN — METHYLPREDNISOLONE SODIUM SUCCINATE 40 MG: 40 INJECTION, POWDER, FOR SOLUTION INTRAMUSCULAR; INTRAVENOUS at 01:07

## 2024-07-26 RX ADMIN — SODIUM CHLORIDE: 9 INJECTION, SOLUTION INTRAVENOUS at 11:07

## 2024-07-26 RX ADMIN — SODIUM CHLORIDE 400 MG: 9 INJECTION, SOLUTION INTRAVENOUS at 01:07

## 2024-07-26 RX ADMIN — DIPHENHYDRAMINE HYDROCHLORIDE 25 MG: 50 INJECTION INTRAMUSCULAR; INTRAVENOUS at 01:07

## 2024-07-26 NOTE — ASSESSMENT & PLAN NOTE
Vitamin D level 25.   Start ergocalciferol 50,000 IU once weekly x 12 weeks then OTC vitamin D supplement once daily.

## 2024-07-28 LAB — AR ZINC, SERUM/PLASMA: 67.2 UG/DL

## 2024-08-02 ENCOUNTER — APPOINTMENT (OUTPATIENT)
Dept: LAB | Facility: HOSPITAL | Age: 39
End: 2024-08-02
Attending: NURSE PRACTITIONER
Payer: MEDICAID

## 2024-08-23 ENCOUNTER — INFUSION (OUTPATIENT)
Dept: INFUSION THERAPY | Facility: HOSPITAL | Age: 39
End: 2024-08-23
Attending: INTERNAL MEDICINE
Payer: MEDICAID

## 2024-08-23 VITALS
SYSTOLIC BLOOD PRESSURE: 98 MMHG | TEMPERATURE: 98 F | BODY MASS INDEX: 31.78 KG/M2 | HEART RATE: 67 BPM | DIASTOLIC BLOOD PRESSURE: 60 MMHG | HEIGHT: 62 IN | WEIGHT: 172.69 LBS | OXYGEN SATURATION: 100 % | RESPIRATION RATE: 20 BRPM

## 2024-08-23 DIAGNOSIS — K50.10 CROHN'S DISEASE OF COLON WITHOUT COMPLICATION: Primary | ICD-10-CM

## 2024-08-23 PROCEDURE — 96415 CHEMO IV INFUSION ADDL HR: CPT

## 2024-08-23 PROCEDURE — 96413 CHEMO IV INFUSION 1 HR: CPT

## 2024-08-23 PROCEDURE — 25000003 PHARM REV CODE 250: Performed by: INTERNAL MEDICINE

## 2024-08-23 PROCEDURE — 63600175 PHARM REV CODE 636 W HCPCS: Performed by: INTERNAL MEDICINE

## 2024-08-23 PROCEDURE — 96375 TX/PRO/DX INJ NEW DRUG ADDON: CPT

## 2024-08-23 RX ORDER — EPINEPHRINE 1 MG/ML
0.3 INJECTION, SOLUTION, CONCENTRATE INTRAVENOUS
OUTPATIENT
Start: 2024-09-20

## 2024-08-23 RX ORDER — DIPHENHYDRAMINE HYDROCHLORIDE 50 MG/ML
25 INJECTION INTRAMUSCULAR; INTRAVENOUS
OUTPATIENT
Start: 2024-09-20

## 2024-08-23 RX ORDER — EPINEPHRINE 1 MG/ML
0.3 INJECTION, SOLUTION, CONCENTRATE INTRAVENOUS
Status: ACTIVE | OUTPATIENT
Start: 2024-08-23 | End: 2024-08-23

## 2024-08-23 RX ORDER — ACETAMINOPHEN 500 MG
500 TABLET ORAL
OUTPATIENT
Start: 2024-09-20

## 2024-08-23 RX ORDER — DIPHENHYDRAMINE HYDROCHLORIDE 50 MG/ML
25 INJECTION INTRAMUSCULAR; INTRAVENOUS
Status: ACTIVE | OUTPATIENT
Start: 2024-08-23 | End: 2024-08-23

## 2024-08-23 RX ORDER — IPRATROPIUM BROMIDE AND ALBUTEROL SULFATE 2.5; .5 MG/3ML; MG/3ML
3 SOLUTION RESPIRATORY (INHALATION)
Status: ACTIVE | OUTPATIENT
Start: 2024-08-23 | End: 2024-08-23

## 2024-08-23 RX ORDER — METHYLPREDNISOLONE SOD SUCC 125 MG
40 VIAL (EA) INJECTION
Start: 2024-09-20

## 2024-08-23 RX ORDER — IPRATROPIUM BROMIDE AND ALBUTEROL SULFATE 2.5; .5 MG/3ML; MG/3ML
3 SOLUTION RESPIRATORY (INHALATION)
OUTPATIENT
Start: 2024-09-20

## 2024-08-23 RX ORDER — METHYLPREDNISOLONE SOD SUCC 125 MG
40 VIAL (EA) INJECTION
OUTPATIENT
Start: 2024-09-20

## 2024-08-23 RX ORDER — DIPHENHYDRAMINE HYDROCHLORIDE 50 MG/ML
25 INJECTION INTRAMUSCULAR; INTRAVENOUS
Status: COMPLETED | OUTPATIENT
Start: 2024-08-23 | End: 2024-08-23

## 2024-08-23 RX ORDER — ACETAMINOPHEN 325 MG/1
650 TABLET ORAL
Status: ACTIVE | OUTPATIENT
Start: 2024-08-23 | End: 2024-08-23

## 2024-08-23 RX ORDER — ACETAMINOPHEN 500 MG
500 TABLET ORAL
Status: COMPLETED | OUTPATIENT
Start: 2024-08-23 | End: 2024-08-23

## 2024-08-23 RX ORDER — ACETAMINOPHEN 325 MG/1
650 TABLET ORAL
OUTPATIENT
Start: 2024-09-20

## 2024-08-23 RX ADMIN — SODIUM CHLORIDE 400 MG: 9 INJECTION, SOLUTION INTRAVENOUS at 09:08

## 2024-08-23 RX ADMIN — ACETAMINOPHEN 500 MG: 500 TABLET, FILM COATED ORAL at 09:08

## 2024-08-23 RX ADMIN — METHYLPREDNISOLONE SODIUM SUCCINATE 40 MG: 40 INJECTION, POWDER, FOR SOLUTION INTRAMUSCULAR; INTRAVENOUS at 09:08

## 2024-08-23 RX ADMIN — SODIUM CHLORIDE: 9 INJECTION, SOLUTION INTRAVENOUS at 09:08

## 2024-08-23 RX ADMIN — DIPHENHYDRAMINE HYDROCHLORIDE 25 MG: 50 INJECTION INTRAMUSCULAR; INTRAVENOUS at 09:08

## 2024-08-23 NOTE — NURSING
PROGRESS NOTE      Patient: Nabeel Puentes Today's Date: 9/4/2017   YOB: 1928 Date of Admission: 8/27/2017   MR Number: [id number] Attending Physician: Hema Flaherty MD     Code Status:  No Resuscitation with Maximal Medical Support    Hospital Day: 9    Primary Care Provider: Ross Sandoval MD  Consulting Physician(s): Dr. San/ Dr. Carmona.  Transfusion:  None   Procedures:  None.    Active Issues and Reason for Visit:  Principal Problem:    Near syncope  Active Problems:    Essential hypertension, benign    Coronary atherosclerosis of native coronary artery    AMD (age-related macular degeneration), bilateral    Hyperlipidemia LDL goal <70    VBI (vertebrobasilar insufficiency)    Status post evacuation of subdural hematoma    Seizure disorder (CMS/HCC)    Pneumonia of both lungs due to infectious organism    Hypoxemia    Elevated LFTs    Hypokalemia    Orthostatic hypotension      Assessment and Plan:  Problem list as noted above.  1;  Near syncope/ Dizziness; Improved, most likely related to underlying orthostatic hypotension, elective physiology Dr. San on board, patient is on  Mestinon, blood pressure is very well controlled, will continue the same medication. Rest of the management as per EP.    2; Altered Mental Status; patient's mentation is not significantly improved, CT scan of the head is negative, neurology has signed off no acute interventions from neurology. Geriatrics Dr. Kaur Note is highly appreciated. Patient's mentation changes multifactorial including secondary to worsening dementia, history of some dural hematoma with multiple craniotomies also making more complicated. As per neurology most likely patient's mentation does not improve. Family is completely aware of the head, patient will be discharged to SNF and then later transferred to possible hospice from there.    3;  Pneumonia of both lungs due to infectious organism/Hypoxemia; improving, oxygen requirements are  Pt arrived for T25 Inflectra q4wks. Given PIV. Denies fever, or any recent wounds or surgeries. Denies any previous reactions to Inflectra. Tolerated infusion given in 2 hours.   completely resolved, currently patient is not on any supplemental oxygen will continue levofloxacin for now. We will change it to oral on discharge.    4; Hx of TBI and SDH with craniotomy and evacuation x 2 (5/2017, 6/2017); but subsequent meningitis, CT scan of the head is stable, we will continue to hold any anticoagulation  therapy including heparin subcutaneous.    5; Abnormal LFTs: Improved.    6; CAD s/p CABG (2012); patient cannot be started on any antiplatelet and the anticoagulation therapy because of history of bleeding, will continue beta blocker as well as statin.    7; HTN/HLD; chronic, continue patient's home medications.    8; Involuntary Movement of the Right Hand; most likely related to underlying neurosurgical issues, patient has been seen by neurology on 8/15/17, as per recommendation no new medication or any intervention necessary, patient needs to follow-up with epilepsy specialist at [facility] as scheduled.    9; DVT/PE Prophylaxis; on the mechanical prophylaxis.    10; Overall patient's prognosis is poor, code status is changed to no resuscitation with maximal medical management. Palliative team on board, patient is being discharged to subacute rehabilitation with transition to long-term care with hospice from there. Family on integument for that.     Follow up; with  regarding placement possible discharge on Tuesday.    Disposition:  Possible D/C on Tuesday.     All orders have been entered electronically through [employer].  Patient seen during multidisciplinary rounds with RN and .  Care plan communicated with patient and staff.  Care plan communicated with family.    Subjective:  Nabeel Puentes is a 89 year old year old male who is being seen in follow up for Near syncope. Patient was seen and examined at the bedside, he was sitting in his chair was having his breakfast, does not look any acute distress, patient is alert, knows that he is in the hospital,  not aware of the year or his full name, patient does not look any acute distress, no new issues overnight as per nursing staff.    Past Medical/Surgical/Social/Family Histories reviewed as recorded in the admit H&P (dated 8/27/2017).  Meds and Allergies reviewed as recorded in EPIC.    Scheduled meds and infusions:  • levofloxacin  750 mg Intravenous Daily   • metoPROLOL  25 mg Oral Daily   • QUEtiapine  25 mg Oral Nightly   • pyridostigmine  30 mg Oral TID   • cholecalciferol  2,000 Units Oral Daily   • famotidine  20 mg Oral 2 times per day   • finasteride  5 mg Oral Daily   • lacosamide  50 mg Oral 2 times per day   • levETIRAcetam  1,000 mg Oral 2 times per day   • losartan  50 mg Oral 2 times per day   • vitamin - therapeutic multivitamins w/minerals  1 tablet Oral Daily   • sodium chloride (PF)  2 mL Injection 2 times per day          Review of Systems:  Constitutional:  (+)generalized weakness, (-)fever, (-)chills, (-)nights sweats, (-)unintentional weight loss  Eyes:  (-)blurry vision, (-)diplopia, (-)conjunctivitis, (-)discharge   HENT:  (-)trauma, (-)headache, (-)change in hearing or tinnitus, (-)rhinorrhea, (-)bloody nose, (-)sore throat, (-)difficulty swallowing, (-)thrush  Respiratory:  (-)cough, (-)wheezing, (-)shortness of breath, (-)dyspnea on exertion  Cardiovascular:  (-)chest pain, (-)palpitations, (-)PND, (-)orthopnea, (-)lower extremity edema  GI:  (-)abdominal pain, (-)distention, (-)nausea, (-)vomiting, (-)diarrhea, (-)constipation, (-)bloody stool, (-)palpable masses  :  (-)dysuria, (-)hematuria, (-)urinary frequency  Musculoskeletal:  (-)joint swelling, (-)deformity, (-)myalgias, (-)arthralgias, (-)back pain  Integument:  (-)skin rash, (-)sores, (-)open ulcers  Neurologic:  (-)numbness or tingling, (-)asymetric loss of strength or function of extremities, (-)changes in speech, (-)memory changes, (-)balance changes  Endocrine:  (-)polyuria, (-)polydipsia, (-)unintentional weight  gain  Lymphatic:  (-)abnormal bruising or bleeding, (-)swollen glands, (-)lymphedema  Psychiatric: (-)changes in mood or affect, (-)anxiety, (-)confusion      OBJECTIVE:  Vital 24 Hour Range Most Recent Value   Temperature Temp  Min: 97.9 °F (36.6 °C)  Max: 98.3 °F (36.8 °C) 98 °F (36.7 °C)   Pulse Pulse  Min: 76  Max: 78 78   Respiratory Resp  Min: 16  Max: 20 16   Blood Pressure BP  Min: 122/58  Max: 172/82 172/82   Pulse Oximetry SpO2  Min: 91 %  Max: 94 %    O2 No Data Recorded      Vital Most Recent Value First Value   Weight 75.6 kg Weight: 74.8 kg   Height 5' 10\" (177.8 cm) Height: 5' 10\" (177.8 cm)       Intake/Output Summary (Last 24 hours) at 09/04/17 1047  Last data filed at 09/04/17 0912   Gross per 24 hour   Intake              430 ml   Output              200 ml   Net              230 ml     Current diet:  2 Times/day W Breakfast & Lunch; Shake, Chocolate Oral Nutrition Supplement  Built Up Handled Utensils -  Note Continuous  Daily - Pm Snack; Hi Pro Pudding, Chocolate Oral Nutrition Supplement  Regular Diet    Physical Exam:  General - alert, not fully oriented, not in acute distress, sitting in the recliner not in acute distress.  Cor - S1, S2, regular rate and rhythm, no murmur or gallop appreciated.  Pulm - good respiratory effort, no wheezing are rhonchi appreciated.  Abd - soft, nontender, nondistended, bowel sounds present in all 4 quadrants.  Ext  -  Warm without clubbing or cyanosis.  No edema.  Skin - No rashes or lesions. Warm and dry.  No decubitus ulcers.    Ancillary Studies including laboratory results are reviewed as recorded in [facility] 9/4/2017 10:47 AM.  A subset of labs and results are listed below:  lab last 24 hrs;   Recent Results (from the past 24 hour(s))   Comprehensive Metabolic Panel    Collection Time: 09/04/17  5:30 AM   Result Value Ref Range    Sodium 143 135 - 145 mmol/L    Potassium 3.4 3.4 - 5.1 mmol/L    Chloride 109 (H) 98 - 107 mmol/L    Carbon Dioxide 27  21 - 32 mmol/L    Anion Gap 10 10 - 20 mmol/L    Glucose 102 (H) 65 - 99 mg/dL    BUN 13 6 - 20 mg/dL    Creatinine 0.83 0.67 - 1.17 mg/dL    GFR Estimate,  >90     GFR Estimate, Non African American 79     BUN/Creatinine Ratio 16 7 - 25    CALCIUM 8.1 (L) 8.4 - 10.2 mg/dL    TOTAL BILIRUBIN 0.3 0.2 - 1.0 mg/dL    AST/SGOT 29 <38 Units/L    ALT/SGPT 58 <79 Units/L    ALK PHOSPHATASE 94 45 - 117 Units/L    TOTAL PROTEIN 6.1 (L) 6.4 - 8.2 g/dL    Albumin 2.0 (L) 3.6 - 5.1 g/dL    GLOBULIN 4.1 (H) 2.0 - 4.0 g/dL    A/G Ratio, Serum 0.5 (L) 1.0 - 2.4       Ct Head Brain    Result Date: 8/27/2017  EXAM:  CT HEAD WO CONTRAST CLINICAL INDICATION: 89 years-old Male, presenting history of SYNCOPE/FAINTING, Pls compare to prior. Thanks.. COMPARISON:  August 22, 2017 TECHNIQUE:  Routine noncontrast head CT spanning cranial vertex through foramen magnum. Coronal and sagittal reformats. FINDINGS:  There are persistent subdural collections deep to both craniotomy defects, left larger than right. The appearance is essentially unchanged since the prior exam allowing for slight differences in patient positioning.  Mild prominence of the ventricles and sulci  reflective of age-appropriate intracranial volume loss. Moderate patchy low-attenuation changes in the deep and periventricular cerebral white matter are nonspecific, but of the type typically ascribed to microvascular ischemic change in a patient of this age.  No CT evidence for evolving infarct in a major intracranial vascular territory, although MRI is more sensitive for detection of acute ischemia.  No acute parenchymal or subarachnoid hemorrhage is identified. Mucous retention cysts are visible in the maxillary sinuses. The visible paranasal sinuses and mastoid air cells are otherwise clear.     IMPRESSION:  1. Evidence of bilateral craniotomies with persistent, stable subdural collections. 2. No acute intracranial pathology or significant interval change.  3. Atrophy and chronic microvascular ischemic disease  //Location Code: [facility]     Ct Head Brain    Result Date: 8/22/2017  CT HEAD WO CONTRAST HISTORY: SUB-DURAL HEMORRHAGE.     TECHNIQUE: Noncontrast axial CT images are obtained from skull base to vertex. Sagittal and coronal reformats are supplied. COMPARISON: CT August 15, 2017. FINDINGS: Bilateral craniotomies are again noted. Collections present deep to the craniotomy's are again noted. On the right, there is minimal residual. On the left, the collection has minimally changed and possibly decreased slightly, but no evidence of new hemorrhage. Gray-white junctions remain intact. White matter hypodensity remains compatible with microvascular disease.     IMPRESSION: Residual small, left greater than right, collections possibly with slight improvement of the left collection since August 15. No new hemorrhage.        Ct Head Brain    Result Date: 8/15/2017  CT HEAD WO CONTRAST HISTORY: INTRACRANIAL HEMORRHAGE, drainage of subdural hematoma in July. now with new tremor in right hand COMPARISON: 7/31/2017 . FINDINGS: Noncontrast imaging was performed 8/15/2017 11:58 AM . The study again showing small bilateral subdural bland appearing fluid collections, larger left than right, subjacent to bilateral craniotomy flaps which are well positioned There are continued mild brain atrophy changes, there are patchy moderate ischemic white matter changes which are stable. There is no parenchymal hemorrhage. There is no cortical infarct currently evident. The brainstem and cerebellum are normal without focal defect. The included sinuses show cysts or polyps in the maxillary sinuses, there is some additional scattered ethmoid disease, there is additional disease in the floor of both frontal sinuses. There are air bubbles and debris changes in the left sphenoid sinus also noted, overall findings are similar to comparison CT. The mastoids are clear. There is no skull fracture. .      IMPRESSION:  Stable bland appearing bilateral subdural hematoma findings. No new or subdural fluid collection or mass effect. Stable ischemic white matter disease changes No acute CT brain finding.. .     Ct Head Brain    Result Date: 7/31/2017  CT HEAD WITHOUT CONTRAST CLINICAL INFORMATION:  DIZZINESS, recent evac for SDH, being treated for meningitis COMPARISON:  CT of the head performed on 7/26/2017. TECHNIQUE:  Routine noncontrast head CT spanning cranial vertex through foramen magnum.  Coronal reformats. FINDINGS:  Craniotomy defects are seen bilaterally from drainages of bilateral subdural. The small bilateral subdural fluid collections are stable as compared to the 7/26/2017 exam mild central and cortical atrophy is noted. There is approximately 3 mm of left-to-right midline shift noted. This is also stable as per to 7/26/2017 exam. No CT evidence of an evolving infarct in a major vascular territory; although, MRI is more sensitive for the detection of acute ischemia. Bilateral craniotomy defects are noted which are stable. Retention cyst or polyp is noted in the right maxillary sinus     IMPRESSION:  Stable small bilateral subdural fluid collections. The exam appears stable as per to our 7/26/2017 exam.     Xr Chest Ap Or Pa    Result Date: 8/27/2017  XR CHEST AP OR PA HISTORY: syncope COMPARISON:  July 31, 2017 FINDINGS:  Single portable view of the chest demonstrates extensive multifocal airspace disease throughout both lungs. This is a new finding since the prior exam. There is evidence of prior sternotomy and CABG. Multiple overlapping monitoring leads are present. Small bilateral pleural effusions are suspected.     IMPRESSION: 1. Multifocal airspace disease, new since the prior study. This could represent multifocal pneumonia, pulmonary hemorrhage, or edema. An underlying pulmonary mass cannot be entirely excluded.     Xr Chest Ap Or Pa    Result Date: 7/31/2017  EXAM: XR CHEST AP OR PA CLINICAL  HISTORY: dizziness COMPARISON: 5/8/2017 FINDINGS: Poststernotomy changes from prior bypass surgery present and stable. Ectasia and tortuosity thoracic aorta stable. Cardiac silhouette is normal. Vascularity is normal. The lungs are clear. No consolidations or effusions are noted. The left arm PICC line is now in place with its tip near the junction of the left innominate vein and SVC.     Ct Chest Pe Imaging    Result Date: 8/27/2017  CT ANGIOGRAM CHEST PE IMAGING- 3D -- PULMONARY EMBOLUS PROTOCOL CLINICAL INFORMATION:  PLEURITIC CHEST PAIN, NO FEVER COMPARISON: X-ray from July 31, 2017 TECHNIQUE:  Using a multidetector, multislice helical CT scanner, CT angiography of the chest is performed following administration of IV contrast as a dynamic bolus of 75 cc of Isovue-370.    3D reconstructions including MIPs are reviewed and archived to PACS.   FINDINGS:  Opacification of the pulmonary arterial system is adequate for diagnosis. There is moderate motion degradation in the lower lobes resulting in suboptimal evaluation for segmental lower lobe pulmonary embolism. There is no discrete filling defect identified in the upper lobe pulmonary arteries or central pulmonary arteries. The lower lobe segmental and smaller vessels are obscured by respiratory motion artifact. There is extensive multifocal airspace consolidation with a primarily peripheral distribution involving all of the pulmonary lobes. Small bilateral pleural effusions are also present. No discrete mass is identified. There are a few mildly prominent paratracheal lymph nodes, with no definite lymphadenopathy by size criteria. The aorta is normal in caliber. The heart is mildly enlarged. There is evidence of prior sternotomy and CABG. There is mild degenerative disease throughout the thoracic spine. No acute fracture or destructive bone lesion is identified.     IMPRESSION: 1.  Extensive multifocal airspace disease, new since the comparison chest x-ray of July  31, 2017. This is most suggestive of multifocal pneumonia. Developing ARDS cannot be entirely excluded. Continued imaging follow-up is suggested. 2.  No definite central or upper lobe pulmonary embolism. Evaluation for segmental and smaller pulmonary emboli in the lower lobes is suboptimal due to respiratory motion artifact.     Us Liver / Gallbladder / Pancreas    Result Date: 8/27/2017  US LIVER / GALLBLADDER / PANCREAS Comparison: 7/16/2017, CT scan of the chest earlier today History: Abnormal liver function tests. Technique: Multiple static sonographic images were obtained through the patient's right upper quadrant in real-time. Findings: Images are degraded secondary to overlying bowel gas. Liver: There is a subcentimeter, 5 x 7 x 6 mm anechoic focus near the middle hepatic vein which could reflect a tiny cyst. This was also identified on the prior CT scan. Small possible cyst within left hepatic lobe seen on the prior CT scan is not definitively appreciated. Gallbladder:  There is a 5 x 7 x 5 mm echogenic focus within the mid aspect of the gallbladder which appears adherent to the wall. No definitive flow is appreciated. This could reflect a wall adherent nonshadowing stone although is favored reflect a small polyp. No definitive cholelithiasis is appreciated despite the CT findings. The gallbladder wall measures 2.7 mm. A- sonographic Gutierrez's sign is reported. There is no wall hyperemia or pericholecystic fluid. Bile ducts: There is no intrahepatic or extrahepatic bile duct dilatation. The common bile duct measures 2.3 mm. Pancreas: Not well-visualized secondary to overlying bowel gas. IVC: Patent. Right kidney: The visualized portions appear within normal limits.     Impression: 1. Overall quality of the exam is degraded secondary to bowel gas 2. There is a wall adherent, 7 mm echogenic focus within the gallbladder favored reflect a gallbladder polyp although wall adherent stone also lies within the  differential. Given the size, follow-up is recommended to document stability. 3. No evidence for acute cholecystitis. 4. No definitive evidence of cholelithiasis is appreciated within the confines of the exam despite the hyperdensity appreciated within the distal aspect of the fundus on recent CT scan. Again, follow-up is recommended. 5. Subcentimeter anechoic focus within the right hepatic lobe favored reflect a tiny cyst. The small low-density lesion within the left hepatic lobe on the recent CT scan is not visualized sonographically.     Unresulted Labs     Start     Ordered    08/30/17 0600  Magnesium Level  ONE TIME,   Today      08/29/17 1017    08/29/17 1300  Potassium Level  ONE TIME,   TD      08/29/17 1017    08/28/17 1202  Acetylcholine Receptor AB Panel  ONE TIME,   Today      08/28/17 1201    08/28/17 0600  CBC & Auto Differential  AM DRAW,   AMDR      08/27/17 1355    08/28/17 0600  Comprehensive Metabolic Panel  AM DRAW,   AMDR      08/27/17 1355        Unresulted Procedure     Start     Ordered    08/29/17 0740  US Liver / Gallbladder / Pancreas  1 TIME IMAGING,   Routine      08/29/17 0739        Microbiology:  Microbiology Results  (Last 10 results in the past 7 days)    Specimen Gram Smear Culture Result Status      08/27/17  1047  08/27/17  1047 08/27/17  1047     BLOOD WRIL  NO GROWTH 2 DAYS. PENDING     08/27/17  1035  08/27/17  1035 08/27/17  1035     BLOOD HANL  NO GROWTH 2 DAYS. PENDING     08/27/17  1008   08/27/17  1008     URINE   08/28/2017 FINAL     URINE, CLEAN CATCH/MIDSTREAM   08/28/2017 FINAL           Hema Flaherty MD  Hospitalist  9/4/2017 10:47 AM

## 2024-09-20 ENCOUNTER — INFUSION (OUTPATIENT)
Dept: INFUSION THERAPY | Facility: HOSPITAL | Age: 39
End: 2024-09-20
Attending: INTERNAL MEDICINE
Payer: MEDICAID

## 2024-09-20 VITALS
BODY MASS INDEX: 31.71 KG/M2 | OXYGEN SATURATION: 99 % | HEART RATE: 72 BPM | TEMPERATURE: 98 F | DIASTOLIC BLOOD PRESSURE: 70 MMHG | SYSTOLIC BLOOD PRESSURE: 114 MMHG | WEIGHT: 172.31 LBS | RESPIRATION RATE: 18 BRPM | HEIGHT: 62 IN

## 2024-09-20 DIAGNOSIS — K50.10 CROHN'S DISEASE OF COLON WITHOUT COMPLICATION: Primary | ICD-10-CM

## 2024-09-20 PROCEDURE — 63600175 PHARM REV CODE 636 W HCPCS: Mod: JZ,JG | Performed by: INTERNAL MEDICINE

## 2024-09-20 PROCEDURE — 96415 CHEMO IV INFUSION ADDL HR: CPT

## 2024-09-20 PROCEDURE — 96375 TX/PRO/DX INJ NEW DRUG ADDON: CPT

## 2024-09-20 PROCEDURE — 96413 CHEMO IV INFUSION 1 HR: CPT

## 2024-09-20 PROCEDURE — 25000003 PHARM REV CODE 250: Performed by: INTERNAL MEDICINE

## 2024-09-20 RX ORDER — DIPHENHYDRAMINE HYDROCHLORIDE 50 MG/ML
25 INJECTION INTRAMUSCULAR; INTRAVENOUS
Status: ACTIVE | OUTPATIENT
Start: 2024-09-20 | End: 2024-09-20

## 2024-09-20 RX ORDER — ACETAMINOPHEN 500 MG
500 TABLET ORAL
OUTPATIENT
Start: 2024-10-18

## 2024-09-20 RX ORDER — DIPHENHYDRAMINE HYDROCHLORIDE 50 MG/ML
25 INJECTION INTRAMUSCULAR; INTRAVENOUS
OUTPATIENT
Start: 2024-10-18

## 2024-09-20 RX ORDER — IPRATROPIUM BROMIDE AND ALBUTEROL SULFATE 2.5; .5 MG/3ML; MG/3ML
3 SOLUTION RESPIRATORY (INHALATION)
Status: ACTIVE | OUTPATIENT
Start: 2024-09-20 | End: 2024-09-20

## 2024-09-20 RX ORDER — METHYLPREDNISOLONE SOD SUCC 125 MG
40 VIAL (EA) INJECTION
Start: 2024-10-18

## 2024-09-20 RX ORDER — DIPHENHYDRAMINE HYDROCHLORIDE 50 MG/ML
25 INJECTION INTRAMUSCULAR; INTRAVENOUS
Status: COMPLETED | OUTPATIENT
Start: 2024-09-20 | End: 2024-09-20

## 2024-09-20 RX ORDER — ACETAMINOPHEN 325 MG/1
650 TABLET ORAL
OUTPATIENT
Start: 2024-10-18

## 2024-09-20 RX ORDER — METHYLPREDNISOLONE SOD SUCC 125 MG
40 VIAL (EA) INJECTION
OUTPATIENT
Start: 2024-10-18

## 2024-09-20 RX ORDER — IPRATROPIUM BROMIDE AND ALBUTEROL SULFATE 2.5; .5 MG/3ML; MG/3ML
3 SOLUTION RESPIRATORY (INHALATION)
OUTPATIENT
Start: 2024-10-18

## 2024-09-20 RX ORDER — ACETAMINOPHEN 325 MG/1
650 TABLET ORAL
Status: ACTIVE | OUTPATIENT
Start: 2024-09-20 | End: 2024-09-20

## 2024-09-20 RX ORDER — ACETAMINOPHEN 500 MG
500 TABLET ORAL
Status: COMPLETED | OUTPATIENT
Start: 2024-09-20 | End: 2024-09-20

## 2024-09-20 RX ORDER — EPINEPHRINE 1 MG/ML
0.3 INJECTION, SOLUTION, CONCENTRATE INTRAVENOUS
Status: ACTIVE | OUTPATIENT
Start: 2024-09-20 | End: 2024-09-20

## 2024-09-20 RX ORDER — EPINEPHRINE 1 MG/ML
0.3 INJECTION, SOLUTION, CONCENTRATE INTRAVENOUS
OUTPATIENT
Start: 2024-10-18

## 2024-09-20 RX ADMIN — SODIUM CHLORIDE 400 MG: 9 INJECTION, SOLUTION INTRAVENOUS at 09:09

## 2024-09-20 RX ADMIN — SODIUM CHLORIDE: 9 INJECTION, SOLUTION INTRAVENOUS at 09:09

## 2024-09-20 RX ADMIN — ACETAMINOPHEN 500 MG: 500 TABLET, FILM COATED ORAL at 09:09

## 2024-09-20 RX ADMIN — DIPHENHYDRAMINE HYDROCHLORIDE 25 MG: 50 INJECTION INTRAMUSCULAR; INTRAVENOUS at 09:09

## 2024-09-20 RX ADMIN — METHYLPREDNISOLONE SODIUM SUCCINATE 40 MG: 40 INJECTION, POWDER, FOR SOLUTION INTRAMUSCULAR; INTRAVENOUS at 09:09

## 2024-09-20 NOTE — NURSING
Q4wk inflectra given PIV. Denies pain, SOB, N/V/C/D, fever, cough, rash, or chills. Denies any wounds or recent surgeries. Pt tolerated 2 hr inflectra infusion. V/S monitored q30 min.

## 2024-10-18 ENCOUNTER — INFUSION (OUTPATIENT)
Dept: INFUSION THERAPY | Facility: HOSPITAL | Age: 39
End: 2024-10-18
Attending: INTERNAL MEDICINE
Payer: MEDICAID

## 2024-10-18 VITALS
OXYGEN SATURATION: 98 % | HEART RATE: 77 BPM | RESPIRATION RATE: 18 BRPM | DIASTOLIC BLOOD PRESSURE: 72 MMHG | WEIGHT: 175.88 LBS | HEIGHT: 62 IN | SYSTOLIC BLOOD PRESSURE: 108 MMHG | TEMPERATURE: 98 F | BODY MASS INDEX: 32.37 KG/M2

## 2024-10-18 DIAGNOSIS — K50.10 CROHN'S DISEASE OF COLON WITHOUT COMPLICATION: Primary | ICD-10-CM

## 2024-10-18 PROCEDURE — 63600175 PHARM REV CODE 636 W HCPCS: Mod: JZ,JG | Performed by: INTERNAL MEDICINE

## 2024-10-18 PROCEDURE — 96415 CHEMO IV INFUSION ADDL HR: CPT

## 2024-10-18 PROCEDURE — 96413 CHEMO IV INFUSION 1 HR: CPT

## 2024-10-18 PROCEDURE — 25000003 PHARM REV CODE 250: Performed by: INTERNAL MEDICINE

## 2024-10-18 PROCEDURE — 96375 TX/PRO/DX INJ NEW DRUG ADDON: CPT

## 2024-10-18 RX ORDER — EPINEPHRINE 1 MG/ML
0.3 INJECTION, SOLUTION, CONCENTRATE INTRAVENOUS
OUTPATIENT
Start: 2024-11-15

## 2024-10-18 RX ORDER — DIPHENHYDRAMINE HYDROCHLORIDE 50 MG/ML
25 INJECTION INTRAMUSCULAR; INTRAVENOUS
OUTPATIENT
Start: 2024-11-15

## 2024-10-18 RX ORDER — ACETAMINOPHEN 325 MG/1
650 TABLET ORAL
OUTPATIENT
Start: 2024-11-15

## 2024-10-18 RX ORDER — ACETAMINOPHEN 500 MG
500 TABLET ORAL
Status: COMPLETED | OUTPATIENT
Start: 2024-10-18 | End: 2024-10-18

## 2024-10-18 RX ORDER — ACETAMINOPHEN 500 MG
500 TABLET ORAL
OUTPATIENT
Start: 2024-11-15

## 2024-10-18 RX ORDER — METHYLPREDNISOLONE SOD SUCC 125 MG
40 VIAL (EA) INJECTION
OUTPATIENT
Start: 2024-11-15

## 2024-10-18 RX ORDER — DIPHENHYDRAMINE HYDROCHLORIDE 50 MG/ML
25 INJECTION INTRAMUSCULAR; INTRAVENOUS
Status: COMPLETED | OUTPATIENT
Start: 2024-10-18 | End: 2024-10-18

## 2024-10-18 RX ORDER — METHYLPREDNISOLONE SOD SUCC 125 MG
40 VIAL (EA) INJECTION
Start: 2024-11-15

## 2024-10-18 RX ORDER — IPRATROPIUM BROMIDE AND ALBUTEROL SULFATE 2.5; .5 MG/3ML; MG/3ML
3 SOLUTION RESPIRATORY (INHALATION)
OUTPATIENT
Start: 2024-11-15

## 2024-10-18 RX ADMIN — ACETAMINOPHEN 500 MG: 500 TABLET, FILM COATED ORAL at 09:10

## 2024-10-18 RX ADMIN — DIPHENHYDRAMINE HYDROCHLORIDE 25 MG: 50 INJECTION INTRAMUSCULAR; INTRAVENOUS at 09:10

## 2024-10-18 RX ADMIN — SODIUM CHLORIDE 400 MG: 9 INJECTION, SOLUTION INTRAVENOUS at 09:10

## 2024-10-18 RX ADMIN — METHYLPREDNISOLONE SODIUM SUCCINATE 40 MG: 40 INJECTION, POWDER, FOR SOLUTION INTRAMUSCULAR; INTRAVENOUS at 09:10

## 2024-10-18 RX ADMIN — SODIUM CHLORIDE: 9 INJECTION, SOLUTION INTRAVENOUS at 09:10

## 2024-10-18 NOTE — NURSING
0845 Patient is here for Inflectra q 4 wks.She voices no c/o.   1120 Infusion completed w/o incident.

## 2024-11-15 ENCOUNTER — LAB VISIT (OUTPATIENT)
Dept: LAB | Facility: HOSPITAL | Age: 39
End: 2024-11-15
Payer: MEDICAID

## 2024-11-15 ENCOUNTER — INFUSION (OUTPATIENT)
Dept: INFUSION THERAPY | Facility: HOSPITAL | Age: 39
End: 2024-11-15
Attending: INTERNAL MEDICINE
Payer: MEDICAID

## 2024-11-15 VITALS
WEIGHT: 175.69 LBS | TEMPERATURE: 97 F | OXYGEN SATURATION: 96 % | DIASTOLIC BLOOD PRESSURE: 71 MMHG | HEART RATE: 66 BPM | BODY MASS INDEX: 32.33 KG/M2 | HEIGHT: 62 IN | RESPIRATION RATE: 20 BRPM | SYSTOLIC BLOOD PRESSURE: 107 MMHG

## 2024-11-15 DIAGNOSIS — K50.10 CROHN'S DISEASE OF COLON WITHOUT COMPLICATION: Primary | ICD-10-CM

## 2024-11-15 DIAGNOSIS — E55.9 VITAMIN D DEFICIENCY: ICD-10-CM

## 2024-11-15 DIAGNOSIS — D50.9 IRON DEFICIENCY ANEMIA, UNSPECIFIED IRON DEFICIENCY ANEMIA TYPE: ICD-10-CM

## 2024-11-15 DIAGNOSIS — B35.1 DERMATOPHYTOSIS OF NAIL: ICD-10-CM

## 2024-11-15 LAB
ALBUMIN SERPL-MCNC: 4.1 G/DL (ref 3.5–5)
ALP SERPL-CCNC: 48 UNIT/L (ref 40–150)
ALT SERPL-CCNC: 13 UNIT/L (ref 0–55)
AST SERPL-CCNC: 16 UNIT/L (ref 5–34)
BILIRUB DIRECT SERPL-MCNC: 0.3 MG/DL (ref 0–?)
BILIRUB SERPL-MCNC: 0.7 MG/DL
BILIRUBIN DIRECT+TOT PNL SERPL-MCNC: 0.4 MG/DL (ref 0–0.8)
PROT SERPL-MCNC: 8.1 GM/DL (ref 6.4–8.3)

## 2024-11-15 PROCEDURE — 25000003 PHARM REV CODE 250: Performed by: INTERNAL MEDICINE

## 2024-11-15 PROCEDURE — 96375 TX/PRO/DX INJ NEW DRUG ADDON: CPT

## 2024-11-15 PROCEDURE — 96415 CHEMO IV INFUSION ADDL HR: CPT

## 2024-11-15 PROCEDURE — 63600175 PHARM REV CODE 636 W HCPCS: Performed by: INTERNAL MEDICINE

## 2024-11-15 PROCEDURE — 36415 COLL VENOUS BLD VENIPUNCTURE: CPT

## 2024-11-15 PROCEDURE — 96413 CHEMO IV INFUSION 1 HR: CPT

## 2024-11-15 PROCEDURE — 80076 HEPATIC FUNCTION PANEL: CPT

## 2024-11-15 RX ORDER — DIPHENHYDRAMINE HYDROCHLORIDE 50 MG/ML
25 INJECTION INTRAMUSCULAR; INTRAVENOUS
Status: COMPLETED | OUTPATIENT
Start: 2024-11-15 | End: 2024-11-15

## 2024-11-15 RX ORDER — ACETAMINOPHEN 325 MG/1
650 TABLET ORAL
Status: ACTIVE | OUTPATIENT
Start: 2024-11-15 | End: 2024-11-15

## 2024-11-15 RX ORDER — DIPHENHYDRAMINE HYDROCHLORIDE 50 MG/ML
25 INJECTION INTRAMUSCULAR; INTRAVENOUS
OUTPATIENT
Start: 2024-12-13

## 2024-11-15 RX ORDER — IPRATROPIUM BROMIDE AND ALBUTEROL SULFATE 2.5; .5 MG/3ML; MG/3ML
3 SOLUTION RESPIRATORY (INHALATION)
Status: ACTIVE | OUTPATIENT
Start: 2024-11-15 | End: 2024-11-15

## 2024-11-15 RX ORDER — METHYLPREDNISOLONE SOD SUCC 125 MG
40 VIAL (EA) INJECTION
OUTPATIENT
Start: 2024-12-13

## 2024-11-15 RX ORDER — ACETAMINOPHEN 500 MG
500 TABLET ORAL
OUTPATIENT
Start: 2024-12-13

## 2024-11-15 RX ORDER — METHYLPREDNISOLONE SOD SUCC 125 MG
40 VIAL (EA) INJECTION
Start: 2024-12-13

## 2024-11-15 RX ORDER — ACETAMINOPHEN 325 MG/1
650 TABLET ORAL
OUTPATIENT
Start: 2024-12-13

## 2024-11-15 RX ORDER — ACETAMINOPHEN 500 MG
500 TABLET ORAL
Status: COMPLETED | OUTPATIENT
Start: 2024-11-15 | End: 2024-11-15

## 2024-11-15 RX ORDER — DIPHENHYDRAMINE HYDROCHLORIDE 50 MG/ML
25 INJECTION INTRAMUSCULAR; INTRAVENOUS
Status: ACTIVE | OUTPATIENT
Start: 2024-11-15 | End: 2024-11-15

## 2024-11-15 RX ORDER — IPRATROPIUM BROMIDE AND ALBUTEROL SULFATE 2.5; .5 MG/3ML; MG/3ML
3 SOLUTION RESPIRATORY (INHALATION)
OUTPATIENT
Start: 2024-12-13

## 2024-11-15 RX ORDER — EPINEPHRINE 1 MG/ML
0.3 INJECTION INTRAMUSCULAR; INTRAVENOUS; SUBCUTANEOUS
Status: ACTIVE | OUTPATIENT
Start: 2024-11-15 | End: 2024-11-15

## 2024-11-15 RX ORDER — EPINEPHRINE 1 MG/ML
0.3 INJECTION, SOLUTION, CONCENTRATE INTRAVENOUS
OUTPATIENT
Start: 2024-12-13

## 2024-11-15 RX ADMIN — SODIUM CHLORIDE 400 MG: 9 INJECTION, SOLUTION INTRAVENOUS at 08:11

## 2024-11-15 RX ADMIN — SODIUM CHLORIDE: 9 INJECTION, SOLUTION INTRAVENOUS at 08:11

## 2024-11-15 RX ADMIN — ACETAMINOPHEN 500 MG: 500 TABLET, FILM COATED ORAL at 08:11

## 2024-11-15 RX ADMIN — METHYLPREDNISOLONE SODIUM SUCCINATE 40 MG: 40 INJECTION, POWDER, FOR SOLUTION INTRAMUSCULAR; INTRAVENOUS at 08:11

## 2024-11-15 RX ADMIN — DIPHENHYDRAMINE HYDROCHLORIDE 25 MG: 50 INJECTION INTRAMUSCULAR; INTRAVENOUS at 08:11

## 2024-11-15 NOTE — NURSING
Q4wks Inflectra given PIV. Denies pain, SOB, N/V/C/D, fever, cough, rash, or chills. Denies any recent wounds or procedures done. Tolerated 2 hr infusion.

## 2024-11-17 LAB — PATH REV: NORMAL

## 2024-12-13 ENCOUNTER — INFUSION (OUTPATIENT)
Dept: INFUSION THERAPY | Facility: HOSPITAL | Age: 39
End: 2024-12-13
Attending: INTERNAL MEDICINE
Payer: MEDICAID

## 2024-12-13 VITALS
HEIGHT: 62 IN | DIASTOLIC BLOOD PRESSURE: 62 MMHG | RESPIRATION RATE: 18 BRPM | BODY MASS INDEX: 31.68 KG/M2 | OXYGEN SATURATION: 97 % | SYSTOLIC BLOOD PRESSURE: 103 MMHG | WEIGHT: 172.19 LBS | TEMPERATURE: 98 F | HEART RATE: 63 BPM

## 2024-12-13 DIAGNOSIS — K50.10 CROHN'S DISEASE OF COLON WITHOUT COMPLICATION: Primary | ICD-10-CM

## 2024-12-13 PROCEDURE — 25000003 PHARM REV CODE 250: Performed by: INTERNAL MEDICINE

## 2024-12-13 PROCEDURE — 96375 TX/PRO/DX INJ NEW DRUG ADDON: CPT

## 2024-12-13 PROCEDURE — 63600175 PHARM REV CODE 636 W HCPCS: Performed by: INTERNAL MEDICINE

## 2024-12-13 PROCEDURE — 96366 THER/PROPH/DIAG IV INF ADDON: CPT

## 2024-12-13 PROCEDURE — 96365 THER/PROPH/DIAG IV INF INIT: CPT

## 2024-12-13 RX ORDER — DIPHENHYDRAMINE HYDROCHLORIDE 50 MG/ML
25 INJECTION INTRAMUSCULAR; INTRAVENOUS
OUTPATIENT
Start: 2025-01-10

## 2024-12-13 RX ORDER — METHYLPREDNISOLONE SOD SUCC 125 MG
40 VIAL (EA) INJECTION
OUTPATIENT
Start: 2025-01-10

## 2024-12-13 RX ORDER — EPINEPHRINE 1 MG/ML
0.3 INJECTION, SOLUTION, CONCENTRATE INTRAVENOUS
OUTPATIENT
Start: 2025-01-10

## 2024-12-13 RX ORDER — ACETAMINOPHEN 325 MG/1
650 TABLET ORAL
OUTPATIENT
Start: 2025-01-10

## 2024-12-13 RX ORDER — EPINEPHRINE 1 MG/ML
0.3 INJECTION INTRAMUSCULAR; INTRAVENOUS; SUBCUTANEOUS
Status: ACTIVE | OUTPATIENT
Start: 2024-12-13 | End: 2024-12-13

## 2024-12-13 RX ORDER — DIPHENHYDRAMINE HYDROCHLORIDE 50 MG/ML
25 INJECTION INTRAMUSCULAR; INTRAVENOUS
Status: ACTIVE | OUTPATIENT
Start: 2024-12-13 | End: 2024-12-13

## 2024-12-13 RX ORDER — DIPHENHYDRAMINE HYDROCHLORIDE 50 MG/ML
25 INJECTION INTRAMUSCULAR; INTRAVENOUS
Status: COMPLETED | OUTPATIENT
Start: 2024-12-13 | End: 2024-12-13

## 2024-12-13 RX ORDER — ACETAMINOPHEN 500 MG
500 TABLET ORAL
OUTPATIENT
Start: 2025-01-10

## 2024-12-13 RX ORDER — IPRATROPIUM BROMIDE AND ALBUTEROL SULFATE 2.5; .5 MG/3ML; MG/3ML
3 SOLUTION RESPIRATORY (INHALATION)
Status: ACTIVE | OUTPATIENT
Start: 2024-12-13 | End: 2024-12-13

## 2024-12-13 RX ORDER — METHYLPREDNISOLONE SOD SUCC 125 MG
40 VIAL (EA) INJECTION
Start: 2025-01-10

## 2024-12-13 RX ORDER — ACETAMINOPHEN 325 MG/1
650 TABLET ORAL
Status: ACTIVE | OUTPATIENT
Start: 2024-12-13 | End: 2024-12-13

## 2024-12-13 RX ORDER — IPRATROPIUM BROMIDE AND ALBUTEROL SULFATE 2.5; .5 MG/3ML; MG/3ML
3 SOLUTION RESPIRATORY (INHALATION)
OUTPATIENT
Start: 2025-01-10

## 2024-12-13 RX ORDER — ACETAMINOPHEN 500 MG
500 TABLET ORAL
Status: COMPLETED | OUTPATIENT
Start: 2024-12-13 | End: 2024-12-13

## 2024-12-13 RX ADMIN — METHYLPREDNISOLONE SODIUM SUCCINATE 40 MG: 40 INJECTION, POWDER, FOR SOLUTION INTRAMUSCULAR; INTRAVENOUS at 09:12

## 2024-12-13 RX ADMIN — DIPHENHYDRAMINE HYDROCHLORIDE 25 MG: 50 INJECTION INTRAMUSCULAR; INTRAVENOUS at 08:12

## 2024-12-13 RX ADMIN — ACETAMINOPHEN 500 MG: 500 TABLET ORAL at 08:12

## 2024-12-13 RX ADMIN — SODIUM CHLORIDE: 9 INJECTION, SOLUTION INTRAVENOUS at 08:12

## 2024-12-13 RX ADMIN — SODIUM CHLORIDE 400 MG: 9 INJECTION, SOLUTION INTRAVENOUS at 09:12

## 2025-01-10 ENCOUNTER — INFUSION (OUTPATIENT)
Dept: INFUSION THERAPY | Facility: HOSPITAL | Age: 40
End: 2025-01-10
Attending: INTERNAL MEDICINE
Payer: MEDICAID

## 2025-01-10 VITALS
HEART RATE: 73 BPM | OXYGEN SATURATION: 99 % | RESPIRATION RATE: 18 BRPM | SYSTOLIC BLOOD PRESSURE: 109 MMHG | BODY MASS INDEX: 30.4 KG/M2 | WEIGHT: 166.25 LBS | TEMPERATURE: 98 F | DIASTOLIC BLOOD PRESSURE: 75 MMHG

## 2025-01-10 DIAGNOSIS — K50.10 CROHN'S DISEASE OF COLON WITHOUT COMPLICATION: Primary | ICD-10-CM

## 2025-01-10 PROCEDURE — 25000003 PHARM REV CODE 250: Performed by: INTERNAL MEDICINE

## 2025-01-10 PROCEDURE — 63600175 PHARM REV CODE 636 W HCPCS: Performed by: INTERNAL MEDICINE

## 2025-01-10 PROCEDURE — 96413 CHEMO IV INFUSION 1 HR: CPT

## 2025-01-10 PROCEDURE — 96375 TX/PRO/DX INJ NEW DRUG ADDON: CPT

## 2025-01-10 PROCEDURE — 96415 CHEMO IV INFUSION ADDL HR: CPT

## 2025-01-10 RX ORDER — ACETAMINOPHEN 325 MG/1
650 TABLET ORAL
OUTPATIENT
Start: 2025-02-07

## 2025-01-10 RX ORDER — METHYLPREDNISOLONE SOD SUCC 125 MG
40 VIAL (EA) INJECTION
Start: 2025-02-07

## 2025-01-10 RX ORDER — EPINEPHRINE 1 MG/ML
0.3 INJECTION, SOLUTION, CONCENTRATE INTRAVENOUS
OUTPATIENT
Start: 2025-02-07

## 2025-01-10 RX ORDER — ACETAMINOPHEN 500 MG
500 TABLET ORAL
Status: COMPLETED | OUTPATIENT
Start: 2025-01-10 | End: 2025-01-10

## 2025-01-10 RX ORDER — ACETAMINOPHEN 500 MG
500 TABLET ORAL
OUTPATIENT
Start: 2025-02-07

## 2025-01-10 RX ORDER — DIPHENHYDRAMINE HYDROCHLORIDE 50 MG/ML
25 INJECTION INTRAMUSCULAR; INTRAVENOUS
OUTPATIENT
Start: 2025-02-07

## 2025-01-10 RX ORDER — METHYLPREDNISOLONE SOD SUCC 125 MG
40 VIAL (EA) INJECTION
OUTPATIENT
Start: 2025-02-07

## 2025-01-10 RX ORDER — DIPHENHYDRAMINE HYDROCHLORIDE 50 MG/ML
25 INJECTION INTRAMUSCULAR; INTRAVENOUS
Status: COMPLETED | OUTPATIENT
Start: 2025-01-10 | End: 2025-01-10

## 2025-01-10 RX ORDER — IPRATROPIUM BROMIDE AND ALBUTEROL SULFATE 2.5; .5 MG/3ML; MG/3ML
3 SOLUTION RESPIRATORY (INHALATION)
OUTPATIENT
Start: 2025-02-07

## 2025-01-10 RX ADMIN — DIPHENHYDRAMINE HYDROCHLORIDE 25 MG: 50 INJECTION INTRAMUSCULAR; INTRAVENOUS at 10:01

## 2025-01-10 RX ADMIN — SODIUM CHLORIDE: 9 INJECTION, SOLUTION INTRAVENOUS at 09:01

## 2025-01-10 RX ADMIN — ACETAMINOPHEN 500 MG: 500 TABLET ORAL at 10:01

## 2025-01-10 RX ADMIN — SODIUM CHLORIDE 400 MG: 9 INJECTION, SOLUTION INTRAVENOUS at 10:01

## 2025-01-10 RX ADMIN — METHYLPREDNISOLONE SODIUM SUCCINATE 40 MG: 40 INJECTION, POWDER, FOR SOLUTION INTRAMUSCULAR; INTRAVENOUS at 10:01

## 2025-01-28 ENCOUNTER — PATIENT MESSAGE (OUTPATIENT)
Dept: GASTROENTEROLOGY | Facility: CLINIC | Age: 40
End: 2025-01-28
Payer: MEDICAID

## 2025-02-12 ENCOUNTER — INFUSION (OUTPATIENT)
Dept: INFUSION THERAPY | Facility: HOSPITAL | Age: 40
End: 2025-02-12
Attending: INTERNAL MEDICINE
Payer: MEDICAID

## 2025-02-12 VITALS
SYSTOLIC BLOOD PRESSURE: 112 MMHG | TEMPERATURE: 98 F | OXYGEN SATURATION: 95 % | WEIGHT: 168.19 LBS | HEIGHT: 62 IN | DIASTOLIC BLOOD PRESSURE: 78 MMHG | BODY MASS INDEX: 30.95 KG/M2 | HEART RATE: 81 BPM | RESPIRATION RATE: 16 BRPM

## 2025-02-12 DIAGNOSIS — K50.10 CROHN'S DISEASE OF COLON WITHOUT COMPLICATION: Primary | ICD-10-CM

## 2025-02-12 PROCEDURE — 96413 CHEMO IV INFUSION 1 HR: CPT

## 2025-02-12 PROCEDURE — 96375 TX/PRO/DX INJ NEW DRUG ADDON: CPT

## 2025-02-12 PROCEDURE — 25000003 PHARM REV CODE 250: Performed by: INTERNAL MEDICINE

## 2025-02-12 PROCEDURE — 96415 CHEMO IV INFUSION ADDL HR: CPT

## 2025-02-12 PROCEDURE — 63600175 PHARM REV CODE 636 W HCPCS: Performed by: INTERNAL MEDICINE

## 2025-02-12 RX ORDER — ACETAMINOPHEN 325 MG/1
650 TABLET ORAL
Status: ACTIVE | OUTPATIENT
Start: 2025-02-12 | End: 2025-02-12

## 2025-02-12 RX ORDER — EPINEPHRINE 1 MG/ML
0.3 INJECTION, SOLUTION, CONCENTRATE INTRAVENOUS
OUTPATIENT
Start: 2025-03-05

## 2025-02-12 RX ORDER — DIPHENHYDRAMINE HYDROCHLORIDE 50 MG/ML
25 INJECTION INTRAMUSCULAR; INTRAVENOUS
Status: COMPLETED | OUTPATIENT
Start: 2025-02-12 | End: 2025-02-12

## 2025-02-12 RX ORDER — DIPHENHYDRAMINE HYDROCHLORIDE 50 MG/ML
25 INJECTION INTRAMUSCULAR; INTRAVENOUS
OUTPATIENT
Start: 2025-03-05

## 2025-02-12 RX ORDER — IPRATROPIUM BROMIDE AND ALBUTEROL SULFATE 2.5; .5 MG/3ML; MG/3ML
3 SOLUTION RESPIRATORY (INHALATION)
OUTPATIENT
Start: 2025-03-05

## 2025-02-12 RX ORDER — IPRATROPIUM BROMIDE AND ALBUTEROL SULFATE 2.5; .5 MG/3ML; MG/3ML
3 SOLUTION RESPIRATORY (INHALATION)
Status: ACTIVE | OUTPATIENT
Start: 2025-02-12 | End: 2025-02-12

## 2025-02-12 RX ORDER — DIPHENHYDRAMINE HYDROCHLORIDE 50 MG/ML
25 INJECTION INTRAMUSCULAR; INTRAVENOUS
Status: ACTIVE | OUTPATIENT
Start: 2025-02-12 | End: 2025-02-12

## 2025-02-12 RX ORDER — ACETAMINOPHEN 500 MG
500 TABLET ORAL
OUTPATIENT
Start: 2025-03-05

## 2025-02-12 RX ORDER — METHYLPREDNISOLONE SOD SUCC 125 MG
40 VIAL (EA) INJECTION
Start: 2025-03-05

## 2025-02-12 RX ORDER — ACETAMINOPHEN 325 MG/1
650 TABLET ORAL
OUTPATIENT
Start: 2025-03-05

## 2025-02-12 RX ORDER — EPINEPHRINE 1 MG/ML
0.3 INJECTION INTRAMUSCULAR; INTRAVENOUS; SUBCUTANEOUS
Status: ACTIVE | OUTPATIENT
Start: 2025-02-12 | End: 2025-02-12

## 2025-02-12 RX ORDER — ACETAMINOPHEN 500 MG
500 TABLET ORAL
Status: COMPLETED | OUTPATIENT
Start: 2025-02-12 | End: 2025-02-12

## 2025-02-12 RX ORDER — METHYLPREDNISOLONE SOD SUCC 125 MG
40 VIAL (EA) INJECTION
OUTPATIENT
Start: 2025-03-05

## 2025-02-12 RX ADMIN — METHYLPREDNISOLONE SODIUM SUCCINATE 40 MG: 40 INJECTION, POWDER, FOR SOLUTION INTRAMUSCULAR; INTRAVENOUS at 09:02

## 2025-02-12 RX ADMIN — DIPHENHYDRAMINE HYDROCHLORIDE 25 MG: 50 INJECTION, SOLUTION INTRAMUSCULAR; INTRAVENOUS at 09:02

## 2025-02-12 RX ADMIN — ACETAMINOPHEN 500 MG: 500 TABLET, FILM COATED ORAL at 09:02

## 2025-02-12 RX ADMIN — SODIUM CHLORIDE 400 MG: 9 INJECTION, SOLUTION INTRAVENOUS at 10:02

## 2025-02-12 RX ADMIN — SODIUM CHLORIDE: 9 INJECTION, SOLUTION INTRAVENOUS at 09:02

## 2025-02-18 ENCOUNTER — OFFICE VISIT (OUTPATIENT)
Dept: GASTROENTEROLOGY | Facility: CLINIC | Age: 40
End: 2025-02-18
Payer: MEDICAID

## 2025-02-18 ENCOUNTER — RESULTS FOLLOW-UP (OUTPATIENT)
Dept: GASTROENTEROLOGY | Facility: CLINIC | Age: 40
End: 2025-02-18

## 2025-02-18 VITALS
SYSTOLIC BLOOD PRESSURE: 122 MMHG | RESPIRATION RATE: 16 BRPM | HEIGHT: 62 IN | WEIGHT: 165 LBS | DIASTOLIC BLOOD PRESSURE: 86 MMHG | OXYGEN SATURATION: 95 % | HEART RATE: 76 BPM | BODY MASS INDEX: 30.36 KG/M2 | TEMPERATURE: 98 F

## 2025-02-18 DIAGNOSIS — K50.10 CROHN'S DISEASE OF COLON WITHOUT COMPLICATION: Primary | ICD-10-CM

## 2025-02-18 DIAGNOSIS — K59.04 CHRONIC IDIOPATHIC CONSTIPATION: ICD-10-CM

## 2025-02-18 LAB
25(OH)D3+25(OH)D2 SERPL-MCNC: 39 NG/ML (ref 30–80)
ALBUMIN SERPL-MCNC: 4.2 G/DL (ref 3.5–5)
ALBUMIN/GLOB SERPL: 0.9 RATIO (ref 1.1–2)
ALP SERPL-CCNC: 51 UNIT/L (ref 40–150)
ALT SERPL-CCNC: 14 UNIT/L (ref 0–55)
ANION GAP SERPL CALC-SCNC: 6 MEQ/L
AST SERPL-CCNC: 18 UNIT/L (ref 5–34)
BASOPHILS # BLD AUTO: 0.07 X10(3)/MCL
BASOPHILS NFR BLD AUTO: 0.9 %
BILIRUB SERPL-MCNC: 0.3 MG/DL
BUN SERPL-MCNC: 14.1 MG/DL (ref 7–18.7)
CALCIUM SERPL-MCNC: 9.9 MG/DL (ref 8.4–10.2)
CHLORIDE SERPL-SCNC: 107 MMOL/L (ref 98–107)
CO2 SERPL-SCNC: 23 MMOL/L (ref 22–29)
CREAT SERPL-MCNC: 0.75 MG/DL (ref 0.55–1.02)
CREAT/UREA NIT SERPL: 19
CRP SERPL-MCNC: 2.3 MG/L
EOSINOPHIL # BLD AUTO: 0.97 X10(3)/MCL (ref 0–0.9)
EOSINOPHIL NFR BLD AUTO: 12 %
ERYTHROCYTE [DISTWIDTH] IN BLOOD BY AUTOMATED COUNT: 12.9 % (ref 11.5–17)
GFR SERPLBLD CREATININE-BSD FMLA CKD-EPI: >60 ML/MIN/1.73/M2
GLOBULIN SER-MCNC: 4.7 GM/DL (ref 2.4–3.5)
GLUCOSE SERPL-MCNC: 87 MG/DL (ref 74–100)
HBV CORE AB SERPL QL IA: NONREACTIVE
HBV SURFACE AG SERPL QL IA: NONREACTIVE
HCT VFR BLD AUTO: 41.6 % (ref 37–47)
HGB BLD-MCNC: 13.4 G/DL (ref 12–16)
IMM GRANULOCYTES # BLD AUTO: 0.02 X10(3)/MCL (ref 0–0.04)
IMM GRANULOCYTES NFR BLD AUTO: 0.2 %
LYMPHOCYTES # BLD AUTO: 2.38 X10(3)/MCL (ref 0.6–4.6)
LYMPHOCYTES NFR BLD AUTO: 29.4 %
MCH RBC QN AUTO: 30.4 PG (ref 27–31)
MCHC RBC AUTO-ENTMCNC: 32.2 G/DL (ref 33–36)
MCV RBC AUTO: 94.3 FL (ref 80–94)
MONOCYTES # BLD AUTO: 0.46 X10(3)/MCL (ref 0.1–1.3)
MONOCYTES NFR BLD AUTO: 5.7 %
NEUTROPHILS # BLD AUTO: 4.19 X10(3)/MCL (ref 2.1–9.2)
NEUTROPHILS NFR BLD AUTO: 51.8 %
NRBC BLD AUTO-RTO: 0 %
PLATELET # BLD AUTO: 284 X10(3)/MCL (ref 130–400)
PMV BLD AUTO: 9.8 FL (ref 7.4–10.4)
POTASSIUM SERPL-SCNC: 3.9 MMOL/L (ref 3.5–5.1)
PROT SERPL-MCNC: 8.9 GM/DL (ref 6.4–8.3)
RBC # BLD AUTO: 4.41 X10(6)/MCL (ref 4.2–5.4)
SODIUM SERPL-SCNC: 136 MMOL/L (ref 136–145)
WBC # BLD AUTO: 8.09 X10(3)/MCL (ref 4.5–11.5)

## 2025-02-18 PROCEDURE — 80053 COMPREHEN METABOLIC PANEL: CPT | Performed by: INTERNAL MEDICINE

## 2025-02-18 PROCEDURE — 36415 COLL VENOUS BLD VENIPUNCTURE: CPT | Performed by: INTERNAL MEDICINE

## 2025-02-18 PROCEDURE — 86140 C-REACTIVE PROTEIN: CPT | Performed by: INTERNAL MEDICINE

## 2025-02-18 PROCEDURE — 87340 HEPATITIS B SURFACE AG IA: CPT | Performed by: INTERNAL MEDICINE

## 2025-02-18 PROCEDURE — 85025 COMPLETE CBC W/AUTO DIFF WBC: CPT | Performed by: INTERNAL MEDICINE

## 2025-02-18 PROCEDURE — 82306 VITAMIN D 25 HYDROXY: CPT | Performed by: INTERNAL MEDICINE

## 2025-02-18 PROCEDURE — 86480 TB TEST CELL IMMUN MEASURE: CPT | Performed by: INTERNAL MEDICINE

## 2025-02-18 PROCEDURE — 86704 HEP B CORE ANTIBODY TOTAL: CPT | Performed by: INTERNAL MEDICINE

## 2025-02-18 RX ORDER — LUBIPROSTONE 24 UG/1
24 CAPSULE ORAL 2 TIMES DAILY
Qty: 60 CAPSULE | Refills: 11 | Status: SHIPPED | OUTPATIENT
Start: 2025-02-18 | End: 2026-02-18

## 2025-02-18 NOTE — PROGRESS NOTES
Inflammatory Bowel Disease        Established Patient Note         TODAY'S VISIT DATE:  2/18/2025  The patient's last visit with me was on Visit date not found.     PCP: No, Primary Doctor      Referring MD:   No ref. provider found    History of Present Illness:  Shivani is a 39 year old with Crohn's colitis on infliximab 5 mg/kg every 4 weeks monotherapy here for follow-up.    She was diagnosed with Crohn's colitis by Dr. Ann in January 2015 when she began experiencing diarrhea, abdominal pain, and weight loss. When she was diagnosed she was initially started on a steroid taper, balsalazide and when she did not improve, Humira was started. Fecal calprotectin in march 2018: 445. Adalimumab trough in May 2018 was 1.8, Ab<25, so azathioprine was added. She did not tolerate azathioprine per notes. Due to continued symptoms and low trough level, her Humira dose was increased to 40mg weekly in 2018. She was kept on balsalazide as well.     She did have a rectal abscess in the past, referred to surgery by Dr. Ann, and underwent I&D. No notes to review regarding this and findings.    Adalimumab level in October 2019 was 13, Ab<25. Unsure if this was trough level.     September 2020: Adalimumab trough: 5.7, Ab <25  September 2020: fecal calprotectin 936    When seen initially by me in September 2020, she was doing well clinically. She reported compliance with her Humira weekly. She was no longer taking balsalazide because she felt that it was unhelpful. She reported baseline 1-2 bowel movements a day with occasional hard stools with bloody streaks. She did report occasional breakthrough episodes of increased diarrhea and abdominal pain. She had never had a repeat colonoscopy since starting on Humira. I recommended a colonoscopy at that time. Despite several scheduled colonoscopies, she canceled each appointment and did not follow up till April 2022    She was found to be pregnant in  November 2021. She had a miscarriage during this pregnancy.     In April 2022 on follow-up she was having diarrhea, abdominal pain, weight loss.  She had not been on Humira due to prescription lapse for 3 months.  She was taking NSAIDs and was given prednisone 5mg by PCP which was not helpful. She denied heartburn or reflux.     April 2022 Colonoscopy: multiple scattered medium to large ulcerations throughout the colon.      I started her on infliximab (inflectra) and azathioprine in May 2022.  She has completed loading doses of infliximab and was on 5 mg/kg every 8 weeks.  The azathioprine made her have nausea, vomiting, diarrhea so she stopped taking it and symptoms resolved.  She completed a prednisone taper as well.     August 2022: IFX trough 3.1, Ab < 20      August 2022: Fecal calprotectin: 751     In August 2022 on follow-up she was pregnant.  Clinically improved on infusions.  She remained on 5 mg/kg every 8 weeks  Delivered healthy baby boy via vaginal delivery in January 2023.  She was followed by M for the pregnancy.      February 2023 Fecal calprotectin: 155  February IFX trough 8.5, Ab < 20     Diarrhea in July 2023. Campylobacter positive- treated.  Fecal calprotectin 2894.  Given steroid taper and recheck of trough.   July 2023: IFX tough 1.9, Ab < 20  Changed to 5 mg/kg every 4 weeks  October 2023: IFX trough 13, Ab < 20    January 2024 Fecal calprotectin: 77.3   January 24, 2024: Colonoscopy: SES-CD 7. scarring and shallow linear ulcerations (2-3) were found mainly at the sites of scarring in the descending colon and in the transverse colon.     August 2024: Fecal calprotectin: < 50     She has had some issues with constipation and I started her on Amitiza.  She is taking amitiza 8 mcg bid but remains with constipation.  She is having one bowel movement every 2-3 days with hard to formed stools and complete evacuate.  She has some hemorrhoid bleeding when wiping.  She has not currently having any  complications with infusions and reports compliance.  She denies other associated symptoms such as abdominal pain, diarrhea, urgency, nocturnal stools, loss of appetite, and weight loss.    She does not smoke, no significant alcohol or recreational drug use. No FH of IBD..     IBD History:  IBD disease: Crohn's disease  Disease location: Colon     Current IBD Therapy:  Inflectra 5mg/kg every 4 weeks (May 2022 - present - changed to 4 weeks in July 2023)     Therapeutic Drug Monitoring Labs:  July 2023: IFX tough 1.9, Ab < 20  Changed to 5 mg/kg every 4 weeks  October 2023: IFX trough 13, Ab < 20     Prior IBD Therapies:  Balsalazide  Humira 40mg weekly (2018- Jan 2022)  Prednisone  Azathioprine        Pertinent Endoscopy/Imaging:  January 7, 2015: Colonoscopy (Dr. Ann): Erythematous mucosa and cratered ulcerations in the ascending and transverse colon. Erythematous mucosa and white plaques in the sigmoid and descending colon. No comment on TI. Pathology: Focal colitis, moderate activity, c/w IBD     April 29, 2022: Colonoscopy: skin tags, normal TI, Med to large patches of deep ulcerations surrounded by normal mucosa in the rectum, sigmoid, descending, transverse, and ascending.  Path: severe active chronic colitis with cryptitis, crypt abscess, dropout    February 2023 Fecal calprotectin: 155     January 2024 Fecal calprotectin: 77.3     January 24, 2024: Colonoscopy: SES-CD 7. scarring and shallow linear ulcerations (2-3) were found mainly at the sites of scarring in the descending colon and in the transverse colon.     August 2024: Fecal calprotectin: < 50     Prior Pertinent Surgeries:   none     Complications:   none     Extraintestinal Manifestations:  None       Review of Systems   Constitutional:  Negative for appetite change and unexpected weight change.   HENT:  Negative for trouble swallowing.    Respiratory:  Negative for chest tightness.    Cardiovascular: Negative.    Gastrointestinal:  Positive  for blood in stool and constipation. Negative for abdominal pain, change in bowel habit, diarrhea, nausea, rectal pain, vomiting, reflux and fecal incontinence.   Musculoskeletal: Negative.    Neurological: Negative.    Psychiatric/Behavioral: Negative.     All other systems reviewed and are negative.         Medical/Surgical History:   Past Medical History:   Diagnosis Date    Crohn's colitis     Mild intermittent asthma, uncomplicated      Past Surgical History:   Procedure Laterality Date    COLONOSCOPY      COLONOSCOPY, WITH 1 OR MORE BIOPSIES N/A 1/24/2024    Procedure: COLONOSCOPY, WITH 1 OR MORE BIOPSIES;  Surgeon: Mary Blanton MD;  Location: [facility] ENDOSCOPY;  Service: Gastroenterology;  Laterality: N/A;    FISTULA REPAIR      TONSILLECTOMY AND ADENOIDECTOMY           Family History:   Family History   Problem Relation Name Age of Onset    Colon cancer Mother      Diabetes Father      Diabetes Maternal Grandmother      Alzheimer's disease Paternal Grandmother          Social History:   Social History     Socioeconomic History    Marital status: Single   Tobacco Use    Smoking status: Never    Smokeless tobacco: Never   Substance and Sexual Activity    Alcohol use: Not Currently    Drug use: Never    Sexual activity: Yes     Partners: Male     Birth control/protection: OCP     Comment: Currently pregnant     Social Drivers of Health     Financial Resource Strain: Low Risk  (8/30/2022)    Received from [email or web address] and Its Subsidiaries and Affiliates    Overall Financial Resource Strain (CARDIA)     Difficulty of Paying Living Expenses: Not hard at all   Food Insecurity: No Food Insecurity (8/30/2022)    Received from [email or web address] Subsidi[facility] and Aff[facility]    Hunger Vital Sign     Worried About Running Out of Food in the Last Year: Never true     Ran Out of Food in the Last Year: Never true   Transportation Needs: No  "Transportation Needs (8/30/2022)    Received from [facility]    PRAPARE - Transportation     Lack of Transportation (Medical): No     Lack of Transportation (Non-Medical): No   Stress: No Stress Concern Present (1/19/2023)    Received from [facility]    [employer] [place] [employer] - Occupational Stress Questionnaire     Feeling of Stress : Not at all   Housing Stability: Unknown (8/30/2022)    [facility]    Housing Stability Vital Sign     Unable to Pay for Housing in the Last Year: No     Number of Places Lived in the Last Year: 1        Review of patient's allergies indicates:  No Known Allergies    Current Medications:   Outpatient Medications Marked as Taking for the 2/18/25 encounter (Office Visit) with Mary Blanton MD   Medication Sig Dispense Refill    albuterol-ipratropium (DUO-NEB) 2.5 mg-0.5 mg/3 mL nebulizer solution USE 1 VIAL VIA NEBULIZER EVERY 6 HOURS AS NEEDED FOR WHEEZING      ergocalciferol (ERGOCALCIFEROL) 50,000 unit Cap Take 1 capsule (50,000 Units total) by mouth every 7 days. 12 capsule 0    lubiprostone (AMITIZA) 8 MCG Cap Take 1 capsule (8 mcg total) by mouth 2 (two) times daily. 60 capsule 6    nebulizer and compressor Manju Comp-Air XLT Compressor for Nebulizer   AS DIRECTED      SYMBICORT 160-4.5 mcg/actuation HFAA Inhale 2 puffs into the lungs every 12 (twelve) hours.          Vital Signs:  /86 (BP Location: Left arm, Patient Position: Sitting)   Pulse 76   Temp 98.1 °F (36.7 °C) (Oral)   Resp 16   Ht 5' 2" (1.575 m)   Wt 74.8 kg (165 lb)   SpO2 95%   BMI 30.18 kg/m²      Physical Exam  Vitals and nursing note reviewed.   Constitutional:       Appearance: Normal appearance.   HENT:      Head: Normocephalic and " atraumatic.      Mouth/Throat:      Mouth: Mucous membranes are moist.   Eyes:      Conjunctiva/sclera: Conjunctivae normal.   Cardiovascular:      Rate and Rhythm: Normal rate and regular rhythm.      Pulses: Normal pulses.      Heart sounds: No murmur heard.     No friction rub. No gallop.   Pulmonary:      Effort: Pulmonary effort is normal.      Breath sounds: Normal breath sounds.   Abdominal:      General: Bowel sounds are normal. There is no distension.      Palpations: Abdomen is soft.      Tenderness: There is no abdominal tenderness. There is no rebound.   Musculoskeletal:         General: Normal range of motion.      Cervical back: Normal range of motion.   Skin:     General: Skin is warm.   Neurological:      General: No focal deficit present.      Mental Status: She is alert. Mental status is at baseline.   Psychiatric:         Mood and Affect: Mood normal.         Behavior: Behavior normal.         Labs: Reviewed  Hgb   Date Value Ref Range Status   07/26/2024 12.6 12.0 - 16.0 g/dL Final     Albumin   Date Value Ref Range Status   11/15/2024 4.1 3.5 - 5.0 g/dL Final     Iron Level   Date Value Ref Range Status   07/26/2024 67 50 - 170 ug/dL Final     Ferritin Level   Date Value Ref Range Status   07/26/2024 33.75 4.63 - 204.00 ng/mL Final     Folate Level   Date Value Ref Range Status   07/26/2024 10.9 7.0 - 31.4 ng/mL Final     Vitamin B12   Date Value Ref Range Status   07/26/2024 817 (H) 213 - 816 pg/mL Final     Vitamin D   Date Value Ref Range Status   07/26/2024 25 (L) 30 - 80 ng/mL Final     Zinc, Serum/Plasma   Date Value Ref Range Status   07/26/2024 67.2 60.0 - 120.0 ug/dL Final     Comment:     INTERPRETIVE INFORMATION: Zinc, Serum or Plasma    Elevated results may be due to skin or collection-related   contamination, including the use of a noncertified metal-free   collection/transport tube. If contamination concerns exist due to   elevated levels of serum/plasma zinc, confirmation with a  second   specimen collected in a certified metal-free tube is recommended.    Circulating zinc concentrations are dependent on albumin status   and are depressed with malnutrition.  Zinc may also be lowered   with infection, inflammation, stress, oral contraceptives, and   pregnancy.  Zinc may be elevated with zinc supplementation or   fasting.  Elevated zinc concentrations may interfere with copper   absorption.     This test was developed and its performance characteristics   determined by [facility]. It has not been cleared or   approved by the US Food and Drug Administration. This test was   performed in a CLIA certified laboratory and is intended for   clinical purposes.  Performed By: [facility]  [address] [place] [id number]  : Doc Carrillo MD, PhD  CLIA Number: [id number]     CRP   Date Value Ref Range Status   07/26/2024 1.40 <5.00 mg/L Final     Calprotectin, F   Date Value Ref Range Status   08/02/2024 <50.0 <50.0 (Normal) mcg/g Final     Comment:        Test Performed by:  [place] [address]  : Rosalinda Liang Ph.D.; CLIA# [id number]     Hep BsAg Interp   Date Value Ref Range Status   01/09/2024 Nonreactive Nonreactive Final        Quantiferon gold: negative January 2024        Assessment/Plan:    Problem List Items Addressed This Visit    None      HEALTH MAINTENANCE:     Vaccinations:    Influenza (inactive):  recommended annually   PCV 20 (Prevnar): recommended  Tetanus (TdaP): September 2010, recommended every 10 years  HPV (males and females ages 11-46 yo): N/A  Meningococcal: No risk factors   Hepatitis B: Twinrix #3 today  Hepatitis A:  Twinrix #3 today  MMR (live vaccine): UTD          Chickenpox status/Varicella (live vaccine): immune, +Ab  Shingrix: recommended   COVID-19: recommend    Colorectal cancer risk:  Risk factors: > 8 years of disease, > 1/3  colon involved  - Distribution of colonic disease: > 1/3 of colon  - Year of symptom onset: 2015  - Colonoscopy every 2-3 years after endoscopic remission    Ophthalmologic exam recommended yearly  Dermatologic exam recommended yearly due to risk of skin cancer with IBD/meds    Bone health:  Calcium 5094-3850 mg daily and vitamin D 800 IU daily  Risk factors for osteopenia/osteoporosis: steroid use  Vitamin D: 13.4, recheck  Ergocalciferol 50,000 IU weekly x 12 weeks if low  DEXA scan: recommend    Females:  Gynecological exam yearly.    Smoking status: nonsmoker    Follow up: 6 months     1/3 of colon  - Year of symptom onset: 2015  - Colonoscopy every 2-3 years after endoscopic remission    Ophthalmologic exam recommended yearly  Dermatologic exam recommended yearly due to risk of skin cancer with IBD/meds    Bone health:  Calcium 8134-1824 mg daily and vitamin D 800 IU daily  Risk factors for osteopenia/osteoporosis: steroid use  Vitamin D: 25, recheck  Ergocalciferol 50,000 IU weekly x 12 weeks if low  DEXA scan: recommend    Females:  Gynecological exam yearly.    Smoking status: nonsmoker    Follow up: 6 months

## 2025-02-24 LAB
GAMMA INTERFERON BACKGROUND BLD IA-ACNC: 0.04 IU/ML
M TB IFN-G BLD-IMP: NEGATIVE
M TB IFN-G CD4+ BCKGRND COR BLD-ACNC: 0 IU/ML
M TB IFN-G CD4+CD8+ BCKGRND COR BLD-ACNC: 0 IU/ML
MITOGEN IGNF BCKGRD COR BLD-ACNC: 9.96 IU/ML

## 2025-03-14 ENCOUNTER — INFUSION (OUTPATIENT)
Dept: INFUSION THERAPY | Facility: HOSPITAL | Age: 40
End: 2025-03-14
Attending: INTERNAL MEDICINE
Payer: MEDICAID

## 2025-03-14 VITALS
HEART RATE: 83 BPM | OXYGEN SATURATION: 97 % | RESPIRATION RATE: 18 BRPM | WEIGHT: 169.81 LBS | DIASTOLIC BLOOD PRESSURE: 64 MMHG | SYSTOLIC BLOOD PRESSURE: 119 MMHG | HEIGHT: 62 IN | BODY MASS INDEX: 31.25 KG/M2 | TEMPERATURE: 98 F

## 2025-03-14 DIAGNOSIS — K50.10 CROHN'S DISEASE OF COLON WITHOUT COMPLICATION: Primary | ICD-10-CM

## 2025-03-14 PROCEDURE — 25000003 PHARM REV CODE 250: Performed by: INTERNAL MEDICINE

## 2025-03-14 PROCEDURE — 96375 TX/PRO/DX INJ NEW DRUG ADDON: CPT

## 2025-03-14 PROCEDURE — 63600175 PHARM REV CODE 636 W HCPCS: Mod: JZ,TB | Performed by: INTERNAL MEDICINE

## 2025-03-14 PROCEDURE — 96415 CHEMO IV INFUSION ADDL HR: CPT

## 2025-03-14 PROCEDURE — 96413 CHEMO IV INFUSION 1 HR: CPT

## 2025-03-14 RX ORDER — EPINEPHRINE 1 MG/ML
0.3 INJECTION, SOLUTION, CONCENTRATE INTRAVENOUS
OUTPATIENT
Start: 2025-04-11

## 2025-03-14 RX ORDER — ACETAMINOPHEN 325 MG/1
650 TABLET ORAL
OUTPATIENT
Start: 2025-04-11

## 2025-03-14 RX ORDER — DIPHENHYDRAMINE HYDROCHLORIDE 50 MG/ML
25 INJECTION, SOLUTION INTRAMUSCULAR; INTRAVENOUS
OUTPATIENT
Start: 2025-04-11

## 2025-03-14 RX ORDER — METHYLPREDNISOLONE SOD SUCC 125 MG
40 VIAL (EA) INJECTION
Start: 2025-04-11

## 2025-03-14 RX ORDER — IPRATROPIUM BROMIDE AND ALBUTEROL SULFATE 2.5; .5 MG/3ML; MG/3ML
3 SOLUTION RESPIRATORY (INHALATION)
OUTPATIENT
Start: 2025-04-11

## 2025-03-14 RX ORDER — DIPHENHYDRAMINE HYDROCHLORIDE 50 MG/ML
25 INJECTION, SOLUTION INTRAMUSCULAR; INTRAVENOUS
Status: COMPLETED | OUTPATIENT
Start: 2025-03-14 | End: 2025-03-14

## 2025-03-14 RX ORDER — METHYLPREDNISOLONE SOD SUCC 125 MG
40 VIAL (EA) INJECTION
OUTPATIENT
Start: 2025-04-11

## 2025-03-14 RX ORDER — ACETAMINOPHEN 500 MG
500 TABLET ORAL
Status: COMPLETED | OUTPATIENT
Start: 2025-03-14 | End: 2025-03-14

## 2025-03-14 RX ORDER — ACETAMINOPHEN 500 MG
500 TABLET ORAL
OUTPATIENT
Start: 2025-04-11

## 2025-03-14 RX ADMIN — DIPHENHYDRAMINE HYDROCHLORIDE 25 MG: 50 INJECTION INTRAMUSCULAR; INTRAVENOUS at 09:03

## 2025-03-14 RX ADMIN — SODIUM CHLORIDE: 9 INJECTION, SOLUTION INTRAVENOUS at 09:03

## 2025-03-14 RX ADMIN — METHYLPREDNISOLONE SODIUM SUCCINATE 40 MG: 40 INJECTION, POWDER, FOR SOLUTION INTRAMUSCULAR; INTRAVENOUS at 09:03

## 2025-03-14 RX ADMIN — SODIUM CHLORIDE 400 MG: 9 INJECTION, SOLUTION INTRAVENOUS at 09:03

## 2025-03-14 RX ADMIN — ACETAMINOPHEN 500 MG: 500 TABLET, FILM COATED ORAL at 09:03

## 2025-04-11 ENCOUNTER — APPOINTMENT (OUTPATIENT)
Dept: LAB | Facility: HOSPITAL | Age: 40
End: 2025-04-11
Attending: INTERNAL MEDICINE
Payer: MEDICAID

## 2025-04-21 ENCOUNTER — INFUSION (OUTPATIENT)
Dept: INFUSION THERAPY | Facility: HOSPITAL | Age: 40
End: 2025-04-21
Attending: INTERNAL MEDICINE
Payer: MEDICAID

## 2025-04-21 VITALS
OXYGEN SATURATION: 96 % | DIASTOLIC BLOOD PRESSURE: 76 MMHG | SYSTOLIC BLOOD PRESSURE: 121 MMHG | HEIGHT: 62 IN | HEART RATE: 80 BPM | RESPIRATION RATE: 20 BRPM | TEMPERATURE: 98 F | BODY MASS INDEX: 30.83 KG/M2 | WEIGHT: 167.56 LBS

## 2025-04-21 DIAGNOSIS — K50.10 CROHN'S DISEASE OF COLON WITHOUT COMPLICATION: Primary | ICD-10-CM

## 2025-04-21 PROCEDURE — 96375 TX/PRO/DX INJ NEW DRUG ADDON: CPT

## 2025-04-21 PROCEDURE — 96413 CHEMO IV INFUSION 1 HR: CPT

## 2025-04-21 PROCEDURE — 25000003 PHARM REV CODE 250: Performed by: INTERNAL MEDICINE

## 2025-04-21 PROCEDURE — 63600175 PHARM REV CODE 636 W HCPCS: Mod: JZ,TB | Performed by: INTERNAL MEDICINE

## 2025-04-21 PROCEDURE — 96415 CHEMO IV INFUSION ADDL HR: CPT

## 2025-04-21 RX ORDER — ACETAMINOPHEN 500 MG
500 TABLET ORAL
OUTPATIENT
Start: 2025-05-05

## 2025-04-21 RX ORDER — ACETAMINOPHEN 500 MG
500 TABLET ORAL
Status: COMPLETED | OUTPATIENT
Start: 2025-04-21 | End: 2025-04-21

## 2025-04-21 RX ORDER — IPRATROPIUM BROMIDE AND ALBUTEROL SULFATE 2.5; .5 MG/3ML; MG/3ML
3 SOLUTION RESPIRATORY (INHALATION)
OUTPATIENT
Start: 2025-05-05

## 2025-04-21 RX ORDER — ACETAMINOPHEN 325 MG/1
650 TABLET ORAL
OUTPATIENT
Start: 2025-05-05

## 2025-04-21 RX ORDER — EPINEPHRINE 1 MG/ML
0.3 INJECTION, SOLUTION, CONCENTRATE INTRAVENOUS
OUTPATIENT
Start: 2025-05-05

## 2025-04-21 RX ORDER — ACETAMINOPHEN 325 MG/1
650 TABLET ORAL
Status: ACTIVE | OUTPATIENT
Start: 2025-04-21 | End: 2025-04-21

## 2025-04-21 RX ORDER — IPRATROPIUM BROMIDE AND ALBUTEROL SULFATE 2.5; .5 MG/3ML; MG/3ML
3 SOLUTION RESPIRATORY (INHALATION)
Status: ACTIVE | OUTPATIENT
Start: 2025-04-21 | End: 2025-04-21

## 2025-04-21 RX ORDER — DIPHENHYDRAMINE HYDROCHLORIDE 50 MG/ML
25 INJECTION, SOLUTION INTRAMUSCULAR; INTRAVENOUS
Status: ACTIVE | OUTPATIENT
Start: 2025-04-21 | End: 2025-04-21

## 2025-04-21 RX ORDER — DIPHENHYDRAMINE HYDROCHLORIDE 50 MG/ML
25 INJECTION, SOLUTION INTRAMUSCULAR; INTRAVENOUS
Status: COMPLETED | OUTPATIENT
Start: 2025-04-21 | End: 2025-04-21

## 2025-04-21 RX ORDER — METHYLPREDNISOLONE SOD SUCC 125 MG
40 VIAL (EA) INJECTION
OUTPATIENT
Start: 2025-05-05

## 2025-04-21 RX ORDER — METHYLPREDNISOLONE SOD SUCC 125 MG
40 VIAL (EA) INJECTION
Start: 2025-05-05

## 2025-04-21 RX ORDER — DIPHENHYDRAMINE HYDROCHLORIDE 50 MG/ML
25 INJECTION, SOLUTION INTRAMUSCULAR; INTRAVENOUS
OUTPATIENT
Start: 2025-05-05

## 2025-04-21 RX ORDER — EPINEPHRINE 1 MG/ML
0.3 INJECTION INTRAMUSCULAR; INTRAVENOUS; SUBCUTANEOUS
Status: ACTIVE | OUTPATIENT
Start: 2025-04-21 | End: 2025-04-21

## 2025-04-21 RX ADMIN — SODIUM CHLORIDE 400 MG: 9 INJECTION, SOLUTION INTRAVENOUS at 01:04

## 2025-04-21 RX ADMIN — ACETAMINOPHEN 500 MG: 500 TABLET ORAL at 01:04

## 2025-04-21 RX ADMIN — SODIUM CHLORIDE: 9 INJECTION, SOLUTION INTRAVENOUS at 01:04

## 2025-04-21 RX ADMIN — DIPHENHYDRAMINE HYDROCHLORIDE 25 MG: 50 INJECTION, SOLUTION INTRAMUSCULAR; INTRAVENOUS at 01:04

## 2025-04-21 RX ADMIN — METHYLPREDNISOLONE SODIUM SUCCINATE 40 MG: 40 INJECTION, POWDER, LYOPHILIZED, FOR SOLUTION INTRAMUSCULAR; INTRAVENOUS at 01:04

## 2025-05-19 ENCOUNTER — DOCUMENTATION ONLY (OUTPATIENT)
Dept: INFUSION THERAPY | Facility: HOSPITAL | Age: 40
End: 2025-05-19
Payer: MEDICAID

## 2025-05-19 NOTE — PROGRESS NOTES
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  Pt r/s for tomorrow 5/20/25.

## 2025-05-20 ENCOUNTER — INFUSION (OUTPATIENT)
Dept: INFUSION THERAPY | Facility: HOSPITAL | Age: 40
End: 2025-05-20
Attending: INTERNAL MEDICINE
Payer: MEDICAID

## 2025-05-20 VITALS
DIASTOLIC BLOOD PRESSURE: 79 MMHG | WEIGHT: 166.88 LBS | BODY MASS INDEX: 30.71 KG/M2 | RESPIRATION RATE: 20 BRPM | HEIGHT: 62 IN | SYSTOLIC BLOOD PRESSURE: 104 MMHG | HEART RATE: 77 BPM | TEMPERATURE: 98 F | OXYGEN SATURATION: 97 %

## 2025-05-20 DIAGNOSIS — K50.10 CROHN'S DISEASE OF COLON WITHOUT COMPLICATION: Primary | ICD-10-CM

## 2025-05-20 PROCEDURE — 63600175 PHARM REV CODE 636 W HCPCS: Mod: JW,TB | Performed by: INTERNAL MEDICINE

## 2025-05-20 PROCEDURE — 96375 TX/PRO/DX INJ NEW DRUG ADDON: CPT

## 2025-05-20 PROCEDURE — 25000003 PHARM REV CODE 250: Performed by: INTERNAL MEDICINE

## 2025-05-20 PROCEDURE — 96415 CHEMO IV INFUSION ADDL HR: CPT

## 2025-05-20 PROCEDURE — 96413 CHEMO IV INFUSION 1 HR: CPT

## 2025-05-20 RX ORDER — EPINEPHRINE 1 MG/ML
0.3 INJECTION INTRAMUSCULAR; INTRAVENOUS; SUBCUTANEOUS
Status: ACTIVE | OUTPATIENT
Start: 2025-05-20 | End: 2025-05-20

## 2025-05-20 RX ORDER — DIPHENHYDRAMINE HYDROCHLORIDE 50 MG/ML
25 INJECTION, SOLUTION INTRAMUSCULAR; INTRAVENOUS
Status: ACTIVE | OUTPATIENT
Start: 2025-05-20 | End: 2025-05-20

## 2025-05-20 RX ORDER — IPRATROPIUM BROMIDE AND ALBUTEROL SULFATE 2.5; .5 MG/3ML; MG/3ML
3 SOLUTION RESPIRATORY (INHALATION)
OUTPATIENT
Start: 2025-06-17

## 2025-05-20 RX ORDER — ACETAMINOPHEN 325 MG/1
650 TABLET ORAL
Status: ACTIVE | OUTPATIENT
Start: 2025-05-20 | End: 2025-05-20

## 2025-05-20 RX ORDER — EPINEPHRINE 1 MG/ML
0.3 INJECTION, SOLUTION, CONCENTRATE INTRAVENOUS
OUTPATIENT
Start: 2025-06-17

## 2025-05-20 RX ORDER — IPRATROPIUM BROMIDE AND ALBUTEROL SULFATE 2.5; .5 MG/3ML; MG/3ML
3 SOLUTION RESPIRATORY (INHALATION)
Status: ACTIVE | OUTPATIENT
Start: 2025-05-20 | End: 2025-05-20

## 2025-05-20 RX ORDER — ACETAMINOPHEN 500 MG
500 TABLET ORAL
Status: COMPLETED | OUTPATIENT
Start: 2025-05-20 | End: 2025-05-20

## 2025-05-20 RX ORDER — METHYLPREDNISOLONE SOD SUCC 125 MG
40 VIAL (EA) INJECTION
Start: 2025-06-17

## 2025-05-20 RX ORDER — ACETAMINOPHEN 325 MG/1
650 TABLET ORAL
OUTPATIENT
Start: 2025-06-17

## 2025-05-20 RX ORDER — ACETAMINOPHEN 500 MG
500 TABLET ORAL
OUTPATIENT
Start: 2025-06-17

## 2025-05-20 RX ORDER — DIPHENHYDRAMINE HYDROCHLORIDE 50 MG/ML
25 INJECTION, SOLUTION INTRAMUSCULAR; INTRAVENOUS
Status: COMPLETED | OUTPATIENT
Start: 2025-05-20 | End: 2025-05-20

## 2025-05-20 RX ORDER — METHYLPREDNISOLONE SOD SUCC 125 MG
40 VIAL (EA) INJECTION
OUTPATIENT
Start: 2025-06-17

## 2025-05-20 RX ORDER — DIPHENHYDRAMINE HYDROCHLORIDE 50 MG/ML
25 INJECTION, SOLUTION INTRAMUSCULAR; INTRAVENOUS
OUTPATIENT
Start: 2025-06-17

## 2025-05-20 RX ADMIN — ACETAMINOPHEN 500 MG: 500 TABLET, FILM COATED ORAL at 11:05

## 2025-05-20 RX ADMIN — SODIUM CHLORIDE 380 MG: 9 INJECTION, SOLUTION INTRAVENOUS at 12:05

## 2025-05-20 RX ADMIN — SODIUM CHLORIDE: 9 INJECTION, SOLUTION INTRAVENOUS at 11:05

## 2025-05-20 RX ADMIN — METHYLPREDNISOLONE SODIUM SUCCINATE 40 MG: 40 INJECTION, POWDER, FOR SOLUTION INTRAMUSCULAR; INTRAVENOUS at 11:05

## 2025-05-20 RX ADMIN — DIPHENHYDRAMINE HYDROCHLORIDE 25 MG: 50 INJECTION INTRAMUSCULAR; INTRAVENOUS at 11:05

## 2025-06-16 ENCOUNTER — INFUSION (OUTPATIENT)
Dept: INFUSION THERAPY | Facility: HOSPITAL | Age: 40
End: 2025-06-16
Attending: INTERNAL MEDICINE
Payer: MEDICAID

## 2025-06-16 VITALS
WEIGHT: 169.56 LBS | RESPIRATION RATE: 20 BRPM | TEMPERATURE: 98 F | HEIGHT: 62 IN | OXYGEN SATURATION: 96 % | SYSTOLIC BLOOD PRESSURE: 110 MMHG | HEART RATE: 73 BPM | BODY MASS INDEX: 31.2 KG/M2 | DIASTOLIC BLOOD PRESSURE: 75 MMHG

## 2025-06-16 DIAGNOSIS — K50.10 CROHN'S DISEASE OF COLON WITHOUT COMPLICATION: Primary | ICD-10-CM

## 2025-06-16 PROCEDURE — 25000003 PHARM REV CODE 250: Performed by: INTERNAL MEDICINE

## 2025-06-16 PROCEDURE — 96415 CHEMO IV INFUSION ADDL HR: CPT

## 2025-06-16 PROCEDURE — 96375 TX/PRO/DX INJ NEW DRUG ADDON: CPT

## 2025-06-16 PROCEDURE — 96413 CHEMO IV INFUSION 1 HR: CPT

## 2025-06-16 PROCEDURE — 63600175 PHARM REV CODE 636 W HCPCS: Performed by: INTERNAL MEDICINE

## 2025-06-16 RX ORDER — DIPHENHYDRAMINE HYDROCHLORIDE 50 MG/ML
25 INJECTION, SOLUTION INTRAMUSCULAR; INTRAVENOUS
OUTPATIENT
Start: 2025-07-14

## 2025-06-16 RX ORDER — ACETAMINOPHEN 500 MG
500 TABLET ORAL
Status: COMPLETED | OUTPATIENT
Start: 2025-06-16 | End: 2025-06-16

## 2025-06-16 RX ORDER — ACETAMINOPHEN 325 MG/1
650 TABLET ORAL
OUTPATIENT
Start: 2025-07-14

## 2025-06-16 RX ORDER — IPRATROPIUM BROMIDE AND ALBUTEROL SULFATE 2.5; .5 MG/3ML; MG/3ML
3 SOLUTION RESPIRATORY (INHALATION)
OUTPATIENT
Start: 2025-07-14

## 2025-06-16 RX ORDER — METHYLPREDNISOLONE SOD SUCC 125 MG
40 VIAL (EA) INJECTION
OUTPATIENT
Start: 2025-07-14

## 2025-06-16 RX ORDER — ACETAMINOPHEN 325 MG/1
650 TABLET ORAL
Status: ACTIVE | OUTPATIENT
Start: 2025-06-16 | End: 2025-06-16

## 2025-06-16 RX ORDER — IPRATROPIUM BROMIDE AND ALBUTEROL SULFATE 2.5; .5 MG/3ML; MG/3ML
3 SOLUTION RESPIRATORY (INHALATION)
Status: ACTIVE | OUTPATIENT
Start: 2025-06-16 | End: 2025-06-16

## 2025-06-16 RX ORDER — EPINEPHRINE 1 MG/ML
0.3 INJECTION INTRAMUSCULAR; INTRAVENOUS; SUBCUTANEOUS
Status: ACTIVE | OUTPATIENT
Start: 2025-06-16 | End: 2025-06-16

## 2025-06-16 RX ORDER — DIPHENHYDRAMINE HYDROCHLORIDE 50 MG/ML
25 INJECTION, SOLUTION INTRAMUSCULAR; INTRAVENOUS
Status: ACTIVE | OUTPATIENT
Start: 2025-06-16 | End: 2025-06-16

## 2025-06-16 RX ORDER — ACETAMINOPHEN 500 MG
500 TABLET ORAL
OUTPATIENT
Start: 2025-07-14

## 2025-06-16 RX ORDER — EPINEPHRINE 1 MG/ML
0.3 INJECTION, SOLUTION, CONCENTRATE INTRAVENOUS
OUTPATIENT
Start: 2025-07-14

## 2025-06-16 RX ORDER — DIPHENHYDRAMINE HYDROCHLORIDE 50 MG/ML
25 INJECTION, SOLUTION INTRAMUSCULAR; INTRAVENOUS
Status: COMPLETED | OUTPATIENT
Start: 2025-06-16 | End: 2025-06-16

## 2025-06-16 RX ORDER — METHYLPREDNISOLONE SOD SUCC 125 MG
40 VIAL (EA) INJECTION
Start: 2025-07-14

## 2025-06-16 RX ADMIN — SODIUM CHLORIDE: 9 INJECTION, SOLUTION INTRAVENOUS at 12:06

## 2025-06-16 RX ADMIN — METHYLPREDNISOLONE SODIUM SUCCINATE 40 MG: 40 INJECTION, POWDER, LYOPHILIZED, FOR SOLUTION INTRAMUSCULAR; INTRAVENOUS at 12:06

## 2025-06-16 RX ADMIN — ACETAMINOPHEN 500 MG: 500 TABLET ORAL at 12:06

## 2025-06-16 RX ADMIN — SODIUM CHLORIDE 380 MG: 9 INJECTION, SOLUTION INTRAVENOUS at 12:06

## 2025-06-16 RX ADMIN — DIPHENHYDRAMINE HYDROCHLORIDE 25 MG: 50 INJECTION, SOLUTION INTRAMUSCULAR; INTRAVENOUS at 12:06

## 2025-07-14 ENCOUNTER — INFUSION (OUTPATIENT)
Dept: INFUSION THERAPY | Facility: HOSPITAL | Age: 40
End: 2025-07-14
Attending: INTERNAL MEDICINE
Payer: MEDICAID

## 2025-07-14 VITALS
TEMPERATURE: 98 F | SYSTOLIC BLOOD PRESSURE: 110 MMHG | RESPIRATION RATE: 20 BRPM | DIASTOLIC BLOOD PRESSURE: 70 MMHG | BODY MASS INDEX: 30.87 KG/M2 | HEART RATE: 82 BPM | HEIGHT: 62 IN | OXYGEN SATURATION: 95 % | WEIGHT: 167.75 LBS

## 2025-07-14 DIAGNOSIS — K50.10 CROHN'S DISEASE OF COLON WITHOUT COMPLICATION: Primary | ICD-10-CM

## 2025-07-14 PROCEDURE — 25000003 PHARM REV CODE 250: Performed by: INTERNAL MEDICINE

## 2025-07-14 PROCEDURE — 63600175 PHARM REV CODE 636 W HCPCS: Mod: JZ,TB | Performed by: INTERNAL MEDICINE

## 2025-07-14 PROCEDURE — 96413 CHEMO IV INFUSION 1 HR: CPT

## 2025-07-14 PROCEDURE — 96375 TX/PRO/DX INJ NEW DRUG ADDON: CPT

## 2025-07-14 PROCEDURE — 96415 CHEMO IV INFUSION ADDL HR: CPT

## 2025-07-14 RX ORDER — METHYLPREDNISOLONE SOD SUCC 125 MG
40 VIAL (EA) INJECTION
Status: COMPLETED | OUTPATIENT
Start: 2025-07-14 | End: 2025-07-14

## 2025-07-14 RX ORDER — METHYLPREDNISOLONE SOD SUCC 125 MG
40 VIAL (EA) INJECTION
Status: ACTIVE | OUTPATIENT
Start: 2025-07-14 | End: 2025-07-14

## 2025-07-14 RX ORDER — DIPHENHYDRAMINE HYDROCHLORIDE 50 MG/ML
25 INJECTION, SOLUTION INTRAMUSCULAR; INTRAVENOUS
Status: ACTIVE | OUTPATIENT
Start: 2025-07-14 | End: 2025-07-14

## 2025-07-14 RX ORDER — EPINEPHRINE 1 MG/ML
0.3 INJECTION, SOLUTION, CONCENTRATE INTRAVENOUS
Status: ACTIVE | OUTPATIENT
Start: 2025-07-14 | End: 2025-07-14

## 2025-07-14 RX ORDER — ACETAMINOPHEN 500 MG
500 TABLET ORAL
Status: COMPLETED | OUTPATIENT
Start: 2025-07-14 | End: 2025-07-14

## 2025-07-14 RX ORDER — IPRATROPIUM BROMIDE AND ALBUTEROL SULFATE 2.5; .5 MG/3ML; MG/3ML
3 SOLUTION RESPIRATORY (INHALATION)
Status: ACTIVE | OUTPATIENT
Start: 2025-07-14 | End: 2025-07-14

## 2025-07-14 RX ORDER — DIPHENHYDRAMINE HYDROCHLORIDE 50 MG/ML
25 INJECTION, SOLUTION INTRAMUSCULAR; INTRAVENOUS
Status: COMPLETED | OUTPATIENT
Start: 2025-07-14 | End: 2025-07-14

## 2025-07-14 RX ORDER — ACETAMINOPHEN 325 MG/1
650 TABLET ORAL
Status: ACTIVE | OUTPATIENT
Start: 2025-07-14 | End: 2025-07-14

## 2025-07-14 RX ADMIN — ACETAMINOPHEN 500 MG: 500 TABLET ORAL at 12:07

## 2025-07-14 RX ADMIN — METHYLPREDNISOLONE SODIUM SUCCINATE 40 MG: 125 INJECTION, POWDER, LYOPHILIZED, FOR SOLUTION INTRAMUSCULAR; INTRAVENOUS at 12:07

## 2025-07-14 RX ADMIN — SODIUM CHLORIDE 380 MG: 9 INJECTION, SOLUTION INTRAVENOUS at 12:07

## 2025-07-14 RX ADMIN — DIPHENHYDRAMINE HYDROCHLORIDE 25 MG: 50 INJECTION, SOLUTION INTRAMUSCULAR; INTRAVENOUS at 12:07

## 2025-07-14 RX ADMIN — SODIUM CHLORIDE: 9 INJECTION, SOLUTION INTRAVENOUS at 11:07

## 2025-08-18 ENCOUNTER — INFUSION (OUTPATIENT)
Dept: INFUSION THERAPY | Facility: HOSPITAL | Age: 40
End: 2025-08-18
Attending: INTERNAL MEDICINE
Payer: MEDICAID

## 2025-08-18 VITALS
HEIGHT: 62 IN | BODY MASS INDEX: 30.55 KG/M2 | DIASTOLIC BLOOD PRESSURE: 74 MMHG | SYSTOLIC BLOOD PRESSURE: 110 MMHG | OXYGEN SATURATION: 97 % | RESPIRATION RATE: 20 BRPM | HEART RATE: 61 BPM | WEIGHT: 166 LBS | TEMPERATURE: 99 F

## 2025-08-18 DIAGNOSIS — K50.10 CROHN'S DISEASE OF COLON WITHOUT COMPLICATION: Primary | ICD-10-CM

## 2025-08-18 PROCEDURE — 96415 CHEMO IV INFUSION ADDL HR: CPT

## 2025-08-18 PROCEDURE — 63600175 PHARM REV CODE 636 W HCPCS: Mod: JZ,TB | Performed by: INTERNAL MEDICINE

## 2025-08-18 PROCEDURE — 96413 CHEMO IV INFUSION 1 HR: CPT

## 2025-08-18 PROCEDURE — 96375 TX/PRO/DX INJ NEW DRUG ADDON: CPT

## 2025-08-18 PROCEDURE — 25000003 PHARM REV CODE 250: Performed by: INTERNAL MEDICINE

## 2025-08-18 RX ORDER — EPINEPHRINE 1 MG/ML
0.3 INJECTION INTRAMUSCULAR; INTRAVENOUS; SUBCUTANEOUS
Status: ACTIVE | OUTPATIENT
Start: 2025-08-18 | End: 2025-08-18

## 2025-08-18 RX ORDER — ACETAMINOPHEN 325 MG/1
650 TABLET ORAL
Status: ACTIVE | OUTPATIENT
Start: 2025-08-18 | End: 2025-08-18

## 2025-08-18 RX ORDER — ACETAMINOPHEN 500 MG
500 TABLET ORAL
Status: COMPLETED | OUTPATIENT
Start: 2025-08-18 | End: 2025-08-18

## 2025-08-18 RX ORDER — IPRATROPIUM BROMIDE AND ALBUTEROL SULFATE 2.5; .5 MG/3ML; MG/3ML
3 SOLUTION RESPIRATORY (INHALATION)
Status: ACTIVE | OUTPATIENT
Start: 2025-08-18 | End: 2025-08-18

## 2025-08-18 RX ORDER — DIPHENHYDRAMINE HYDROCHLORIDE 50 MG/ML
25 INJECTION, SOLUTION INTRAMUSCULAR; INTRAVENOUS
Status: COMPLETED | OUTPATIENT
Start: 2025-08-18 | End: 2025-08-18

## 2025-08-18 RX ORDER — DIPHENHYDRAMINE HYDROCHLORIDE 50 MG/ML
25 INJECTION, SOLUTION INTRAMUSCULAR; INTRAVENOUS
Status: ACTIVE | OUTPATIENT
Start: 2025-08-18 | End: 2025-08-18

## 2025-08-18 RX ADMIN — DIPHENHYDRAMINE HYDROCHLORIDE 25 MG: 50 INJECTION, SOLUTION INTRAMUSCULAR; INTRAVENOUS at 02:08

## 2025-08-18 RX ADMIN — SODIUM CHLORIDE 380 MG: 9 INJECTION, SOLUTION INTRAVENOUS at 03:08

## 2025-08-18 RX ADMIN — ACETAMINOPHEN 500 MG: 500 TABLET ORAL at 02:08

## 2025-08-18 RX ADMIN — SODIUM CHLORIDE: 9 INJECTION, SOLUTION INTRAVENOUS at 02:08

## 2025-08-18 RX ADMIN — METHYLPREDNISOLONE SODIUM SUCCINATE 40 MG: 40 INJECTION, POWDER, LYOPHILIZED, FOR SOLUTION INTRAMUSCULAR; INTRAVENOUS at 02:08

## 2025-09-30 NOTE — ASSESSMENT & PLAN NOTE
No significant improvement on Amitiza 8 mcg BID  Increase to Amitiza 24 mcg BID  Soluble fiber supplement

## (undated) DEVICE — FORCEP BIOPSY RAD JAW 240CM

## (undated) DEVICE — MANIFOLD 4 PORT

## (undated) DEVICE — SOL IRRI STRL WATER 1000ML

## (undated) DEVICE — KIT SURGICAL COLON .25 1.1OZ